# Patient Record
Sex: MALE | Race: WHITE | NOT HISPANIC OR LATINO | Employment: OTHER | URBAN - METROPOLITAN AREA
[De-identification: names, ages, dates, MRNs, and addresses within clinical notes are randomized per-mention and may not be internally consistent; named-entity substitution may affect disease eponyms.]

---

## 2017-02-07 ENCOUNTER — TELEPHONE (OUTPATIENT)
Dept: MEDICAL GROUP | Facility: MEDICAL CENTER | Age: 77
End: 2017-02-07

## 2017-02-07 RX ORDER — LISINOPRIL 10 MG/1
10 TABLET ORAL DAILY
COMMUNITY
End: 2017-11-07 | Stop reason: SDUPTHER

## 2017-02-07 RX ORDER — ATORVASTATIN CALCIUM 10 MG/1
10 TABLET, FILM COATED ORAL NIGHTLY
COMMUNITY
End: 2017-11-07 | Stop reason: SDUPTHER

## 2017-02-07 NOTE — Clinical Note
[facility]  CARLITO PETERSONPALEX.  [address]   [place] [address]  [facility] [phone]  FAX- [phone] Authorization for Release/Disclosure of Protected Health Information   Name: LALI TUCKER : 1940 SSN: [id number]   Address: [address]  [place] [address] Phone:    [phone] (home)    I authorize the entity listed below to release/disclose the PHI below to [facility]/ESMER PETERSON   Provider or Entity Name: Efren Grande MD       Address [address] [facility] [facility] [address]  [address] [address]   [phone]      Fax: [phone]     Reason for request: continuity of care   Information to be released:    [  ] LAST COLONOSCOPY, including any PATH REPORT [  ] LAST DEXA  [  ] LAST MAMMOGRAM  [  ] LAST PAP [  ] RETINA EXAM REPORT  [  ] IMMUNIZATION RECORDS  [ X ] Release all info      [  ] Check here and initial the line next to each item to release ALL health information INCLUDING  _____ Care and treatment for drug and / or alcohol abuse  _____ HIV testing, infection status, or AIDS  _____ Genetic Testing    DATES OF SERVICE OR TIME PERIOD TO BE DISCLOSED: _____________  I understand and acknowledge that:  * This Authorization may be revoked at any time by you in writing, except if your health information has already been used or disclosed.  * Your health information that will be used or disclosed as a result of you signing this authorization could be re-disclosed by the recipient. If this occurs, your re-disclosed health information may no longer be protected by State or Federal laws.  * You may refuse to sign this Authorization. Your refusal will not affect your ability to obtain treatment.  * This Authorization becomes effective upon signing and will  on (date) __________. If no date is indicated, this Authorization will  one (1) year from the signature date.    Name: Lali Tucker    Signature:     Date: 2017

## 2017-02-07 NOTE — TELEPHONE ENCOUNTER
Future Appointments       Provider Department Center    2/17/2017 10:40 AM KEVIN Andres.[facility]ANES[facility] [address]      Called Michael Pradhan in order to verify health topics prior to the Annual/N2U octaviano:      1)  All medications were updated? yes  2)  Allergies were updated? yes  3)  All care teams were updated? yes  4)  All pharmacies were updated? yes          Health Maintenance Due   Topic Date Due   • IMM DTaP/Tdap/Td Vaccine (1 - Tdap) 01/27/1961   • COLONOSCOPY  01/27/1992   • IMM ZOSTER VACCINE  01/27/2002   • IMM PNEUMOCOCCAL 65+ (ADULT) LOW/MEDIUM RISK SERIES (1 of 2 - PCV13) 01/27/2007   • IMM INFLUENZA (1) 09/01/2016              Current Outpatient Prescriptions   Medication Sig Dispense Refill   • lisinopril (PRINIVIL) 10 MG Tab Take 10 mg by mouth every day.     • atorvastatin (LIPITOR) 10 MG Tab Take 10 mg by mouth every evening.       No current facility-administered medications for this visit.     5)  Web Iz Recommendations:         1) No web iz                 6)  GUNNAR letter will be faxed to:         1) Dr. Casas for Colonoscopy records         7)  Previous PCP Med Rec will be obtained via:       1) Dr. Schwartz       8)  Notes to provider or MA:       1) Establish care               Pt was encouraged to keep the appointment and to arrive at least 30 minutes early. Patient is also aware that they must be on time or they would need to reschedule.    Provider name: SINAN ROBERTSON

## 2017-02-07 NOTE — Clinical Note
[facility]  CARLITO PETERSONPALEX.  [address]   [place] [address]  [facility] [phone]  FAX- [phone] Authorization for Release/Disclosure of Protected Health Information   Name: LALI TUCKER : 1940 SSN: [id number]   Address: [facility] [address]  [place] [address] Phone:    [phone] (home)    I authorize the entity listed below to release/disclose the PHI below to [facility]/ESMER PETERSON   Provider or Entity Name:  Dr. Leif Casas       Address  [address] [place] [address]   Phone:      Fax: [phone]     Reason for request: continuity of care   Information to be released:    [X  ] LAST COLONOSCOPY, including any PATH REPORT [  ] LAST DEXA  [  ] LAST MAMMOGRAM  [  ] LAST PAP [  ] RETINA EXAM REPORT  [  ] IMMUNIZATION RECORDS  [  ] Release all info      [  ] Check here and initial the line next to each item to release ALL health information INCLUDING  _____ Care and treatment for drug and / or alcohol abuse  _____ HIV testing, infection status, or AIDS  _____ Genetic Testing    DATES OF SERVICE OR TIME PERIOD TO BE DISCLOSED: _____________  I understand and acknowledge that:  * This Authorization may be revoked at any time by you in writing, except if your health information has already been used or disclosed.  * Your health information that will be used or disclosed as a result of you signing this authorization could be re-disclosed by the recipient. If this occurs, your re-disclosed health information may no longer be protected by State or Federal laws.  * You may refuse to sign this Authorization. Your refusal will not affect your ability to obtain treatment.  * This Authorization becomes effective upon signing and will  on (date) __________. If no date is indicated, this Authorization will  one (1) year from the signature date.    Name: Lali Tucker    Signature:     Date: 2017

## 2017-02-17 ENCOUNTER — OFFICE VISIT (OUTPATIENT)
Dept: MEDICAL GROUP | Facility: MEDICAL CENTER | Age: 77
End: 2017-02-17
Payer: MEDICARE

## 2017-02-17 VITALS
TEMPERATURE: 97.7 F | RESPIRATION RATE: 16 BRPM | WEIGHT: 210 LBS | HEART RATE: 60 BPM | OXYGEN SATURATION: 96 % | SYSTOLIC BLOOD PRESSURE: 140 MMHG | BODY MASS INDEX: 31.1 KG/M2 | HEIGHT: 69 IN | DIASTOLIC BLOOD PRESSURE: 74 MMHG

## 2017-02-17 DIAGNOSIS — E66.9 OBESITY (BMI 30-39.9): ICD-10-CM

## 2017-02-17 DIAGNOSIS — E78.5 DYSLIPIDEMIA: ICD-10-CM

## 2017-02-17 DIAGNOSIS — I10 ESSENTIAL HYPERTENSION: ICD-10-CM

## 2017-02-17 DIAGNOSIS — L98.9 SKIN LESION OF FACE: ICD-10-CM

## 2017-02-17 DIAGNOSIS — E55.9 VITAMIN D DEFICIENCY DISEASE: ICD-10-CM

## 2017-02-17 PROCEDURE — 1101F PT FALLS ASSESS-DOCD LE1/YR: CPT | Performed by: NURSE PRACTITIONER

## 2017-02-17 PROCEDURE — G8419 CALC BMI OUT NRM PARAM NOF/U: HCPCS | Performed by: NURSE PRACTITIONER

## 2017-02-17 PROCEDURE — 99203 OFFICE O/P NEW LOW 30 MIN: CPT | Performed by: NURSE PRACTITIONER

## 2017-02-17 PROCEDURE — 1036F TOBACCO NON-USER: CPT | Performed by: NURSE PRACTITIONER

## 2017-02-17 PROCEDURE — 3017F COLORECTAL CA SCREEN DOC REV: CPT | Performed by: NURSE PRACTITIONER

## 2017-02-17 PROCEDURE — 4040F PNEUMOC VAC/ADMIN/RCVD: CPT | Performed by: NURSE PRACTITIONER

## 2017-02-17 PROCEDURE — G8432 DEP SCR NOT DOC, RNG: HCPCS | Performed by: NURSE PRACTITIONER

## 2017-02-17 PROCEDURE — G8482 FLU IMMUNIZE ORDER/ADMIN: HCPCS | Performed by: NURSE PRACTITIONER

## 2017-02-17 ASSESSMENT — PATIENT HEALTH QUESTIONNAIRE - PHQ9: CLINICAL INTERPRETATION OF PHQ2 SCORE: 0

## 2017-02-17 NOTE — Clinical Note
[facility]  ESMER Andres  [address]  [place] [address]  Fax: [phone]   Authorization for Release/Disclosure of   Protected Health Information   Name: ALLI TUCKER : 1940 SSN: [id number]   Address: [address] [place], [place] [address]  [place] [address] Phone:    [phone] (home)    I authorize the entity listed below to release/disclose the PHI below to:   [facility]/ESMER Andres and ESMER Andres   Provider or Entity Name:     Address   City, State, Zip   Phone:      Fax:     Reason for request: continuity of care   Information to be released:    [  ] LAST COLONOSCOPY,  including any PATH REPORT and follow-up  [  ] LAST FIT/COLOGUARD RESULT [  ] LAST DEXA  [  ] LAST MAMMOGRAM  [  ] LAST PAP  [  ] LAST LABS [  ] RETINA EXAM REPORT  [  ] IMMUNIZATION RECORDS  [  ] Release all info      [  ] Check here and initial the line next to each item to release ALL health information INCLUDING  _____ Care and treatment for drug and / or alcohol abuse  _____ HIV testing, infection status, or AIDS  _____ Genetic Testing    DATES OF SERVICE OR TIME PERIOD TO BE DISCLOSED: _____________  I understand and acknowledge that:  * This Authorization may be revoked at any time by you in writing, except if your health information has already been used or disclosed.  * Your health information that will be used or disclosed as a result of you signing this authorization could be re-disclosed by the recipient. If this occurs, your re-disclosed health information may no longer be protected by State or Federal laws.  * You may refuse to sign this Authorization. Your refusal will not affect your ability to obtain treatment.  * This Authorization becomes effective upon signing and will  on (date) __________.      If no date is indicated, this Authorization will  one (1) year from the signature date.    Name: Lali Tucker    Signature:   Date:     2017       PLEASE FAX REQUESTED  RECORDS BACK TO: [phone]

## 2017-02-17 NOTE — MR AVS SNAPSHOT
"        Michael Walterhmann   2017 10:40 AM   Office Visit   MRN: [id number]    Department:  [address] [place] [address]   Dept Phone:  [phone]    Description:  Male : 1940   Provider:  ESMER Andres           Reason for Visit     Establish Care           Allergies as of 2017     No Known Allergies      You were diagnosed with     Dyslipidemia   [942852]       Essential hypertension   [9264562]       Vitamin D deficiency disease   [672202]       Skin lesion of face   [055129]         Vital Signs     Blood Pressure Pulse Temperature Respirations Height Weight    140/74 mmHg 60 36.5 °C (97.7 °F) 16 1.753 m (5' 9.02\") 95.255 kg (210 lb)    Body Mass Index Oxygen Saturation Smoking Status             31.00 kg/m2 96% Former Smoker         Basic Information     Date Of Birth Sex Race Ethnicity Preferred Language    1940 Male White Non- English      Your appointments     2017 10:00 AM   ANNUAL EXAM PREVENTATIVE with ESMER Andres   [facility] [address]    [address]  [facility] [address]  [facility] [address]   [phone]              Problem List              ICD-10-CM Priority Class Noted - Resolved    Dyslipidemia E78.5   2017 - Present    Essential hypertension I10   2017 - Present    Vitamin D deficiency disease E55.9   2017 - Present      Health Maintenance        Date Due Completion Dates    IMM DTaP/Tdap/Td Vaccine (1 - Tdap) 1961 ---    IMM ZOSTER VACCINE 2002 ---    IMM PNEUMOCOCCAL 65+ (ADULT) LOW/MEDIUM RISK SERIES (1 of 2 - PCV13) 2007 ---    IMM INFLUENZA (1) 2016 ---    COLONOSCOPY 2026 (Done)    Override on 2016: Done            Current Immunizations     13-VALENT PCV PREVNAR 2012    Influenza Vaccine Adult HD 10/10/2016    Pneumococcal polysaccharide vaccine (PPSV-23) 10/10/2015    SHINGLES VACCINE 3/7/2013      Below and/or attached are the medications your provider expects you to take. " Review all of your home medications and newly ordered medications with your provider and/or pharmacist. Follow medication instructions as directed by your provider and/or pharmacist. Please keep your medication list with you and share with your provider. Update the information when medications are discontinued, doses are changed, or new medications (including over-the-counter products) are added; and carry medication information at all times in the event of emergency situations     Allergies:  No Known Allergies          Medications  Valid as of: February 17, 2017 - 10:59 AM    Generic Name Brand Name Tablet Size Instructions for use    Atorvastatin Calcium (Tab) LIPITOR 10 MG Take 10 mg by mouth every evening.        Cholecalciferol (Tab) cholecalciferol 1000 UNIT Take 1,000 Units by mouth every day.        Lisinopril (Tab) PRINIVIL 10 MG Take 10 mg by mouth every day.        .                 Medicines prescribed today were sent to:     [facility] - [address] [address]    Phone: [phone] Fax: [phone]    Open 24 Hours?: No    [facility] - [place], [place] - [address] [address]    Phone: [phone] Fax: [phone]    Open 24 Hours?: No      Medication refill instructions:       If your prescription bottle indicates you have medication refills left, it is not necessary to call your provider’s office. Please contact your pharmacy and they will refill your medication.    If your prescription bottle indicates you do not have any refills left, you may request refills at any time through one of the following ways: The online [employer] system (except Urgent Care), by calling your provider’s office, or by asking your pharmacy to contact your provider’s office with a refill request. Medication refills are processed only during regular business hours and may not be available until the next business day.  Your provider may request additional information or to have a follow-up visit with you prior to refilling your medication.   *Please Note: Medication refills are assigned a new Rx number when refilled electronically. Your pharmacy may indicate that no refills were authorized even though a new prescription for the same medication is available at the pharmacy. Please request the medicine by name with the pharmacy before contacting your provider for a refill.        Your To Do List     Future Labs/Procedures Complete By Expires    COMP METABOLIC PANEL  As directed 2/18/2018    LIPID PROFILE  As directed 2/18/2018    TSH  As directed 2/18/2018    VITAMIN 1,25 DIHYDROXY  As directed 2/18/2018      Referral     A referral request has been sent to our patient care coordination department. Please allow 3-5 business days for us to process this request and contact you either by phone or mail. If you do not hear from us by the 5th business day, please call us at [phone].           [employer] Access Code: [email or web address]  Expires: 3/19/2017 10:59 AM    Your email address is not on file at [facility].  Email Addresses are required for you to sign up for [employer], please contact [phone] to verify your personal information and to provide your email address prior to attempting to register for [employer].    Michael Pradhan  [address]  [place] [address]    [employer]  A secure, online tool to manage your health information     [facility]’s [employer]® is a secure, online tool that connects you to your personalized health information from the privacy of your home -- day or night - making it very easy for you to manage your healthcare. Once the activation process is completed, you can even access your medical information using the [employer] bryanna, which is available for free in the Apple Bryanna store or Google Play store.     To learn more about [employer], visit [email or web address]    There are two levels of access available  (as shown below):   My Chart Features  [facility]  Urgent  Care Non-[facility] Doctor   Email your healthcare team securely and privately 24/7 X X X    Manage appointments: schedule your next appointment; view details of past/upcoming appointments X      Request prescription refills. X      View recent personal medical records, including lab and immunizations X X X X   View health record, including health history, allergies, medications X X X X   Read reports about your outpatient visits, procedures, consult and ER notes X X X X   See your discharge summary, which is a recap of your hospital and/or ER visit that includes your diagnosis, lab results, and care plan X X  X     How to register for [employer]:  Once your e-mail address has been verified, follow the following steps to sign up for [employer].     1. Go to  [email or web address]  2. Click on the Sign Up Now box, which takes you to the New Member Sign Up page. You will need to provide the following information:  a. Enter your [employer] Access Code exactly as it appears at the top of this page. (You will not need to use this code after you’ve completed the sign-up process. If you do not sign up before the expiration date, you must request a new code.)   b. Enter your date of birth.   c. Enter your home email address.   d. Click Submit, and follow the next screen’s instructions.  3. Create a [employer] ID. This will be your [employer] login ID and cannot be changed, so think of one that is secure and easy to remember.  4. Create a [employer] password. You can change your password at any time.  5. Enter your Password Reset Question and Answer. This can be used at a later time if you forget your password.   6. Enter your e-mail address. This allows you to receive e-mail notifications when new information is available in [employer].  7. Click Sign Up. You can now view your health information.    For assistance activating your  MyChart account, call [phone]

## 2017-02-17 NOTE — PROGRESS NOTES
Subjective:      Michael Pradhan is a 77 y.o. male who presents with [facility].Michael Pradhan is here today to establish care. He and his wife recently moved here from [place] and his wife also established with the clinic.        1. Dyslipidemia  Patient reports history of well-controlled dyslipidemia with Lipitor 10 mg. He states his last blood work was within the year. He reports no myalgias with medicine. He does not have records with him.    2. Essential hypertension  Patient is currently on lisinopril 10 mg and states his blood pressure is normally well controlled. His systolic blood pressure is just mildly elevated today but he complains of no problems with cough.    3. Vitamin D deficiency disease  Patient currently on over-the-counter vitamin D because of history of vitamin D deficiency.    4. Skin lesion of face  Patient states he was following with dermatology in [place] because of what sounds like actinic keratoses which were previously treated. He has one or 2 areas on his face which he would like to have looked at.        Current Outpatient Prescriptions   Medication Sig Dispense Refill   • vitamin D (CHOLECALCIFEROL) 1000 UNIT Tab Take 1,000 Units by mouth every day.     • lisinopril (PRINIVIL) 10 MG Tab Take 10 mg by mouth every day.     • atorvastatin (LIPITOR) 10 MG Tab Take 10 mg by mouth every evening.       No current facility-administered medications for this visit.     Social History   Substance Use Topics   • Smoking status: Former Smoker -- 4 years     Types: Cigarettes     Quit date: 02/07/1969   • Smokeless tobacco: Never Used   • Alcohol Use: Yes      Comment: RARELY     Family History   Problem Relation Age of Onset   • Diabetes Mother      Past Medical History   Diagnosis Date   • Hypertension    • Hyperlipidemia        Review of Systems   Skin: Positive for rash.   All other systems reviewed and are negative.         Objective:     /74 mmHg  Pulse 60  " Temp([facility]) 36.5 °C (97.7 °F)  Resp 16  Ht 1.753 m (5' 9.02\")  Wt 95.255 kg (210 lb)  BMI 31.00 kg/m2  SpO2 96%     Physical Exam   Constitutional: He is oriented to person, place, and time. He appears well-developed and well-nourished. No distress.   HENT:   Head: Normocephalic and atraumatic.   Right Ear: External ear normal.   Left Ear: External ear normal.   Nose: Nose normal.   Mouth/Throat: Oropharynx is clear and moist.   Eyes: Conjunctivae are normal. Right eye exhibits no discharge. Left eye exhibits no discharge.   Neck: Normal range of motion. Neck supple. No tracheal deviation present. No thyromegaly present.   Cardiovascular: Normal rate, regular rhythm and normal heart sounds.    No murmur heard.  Pulmonary/Chest: Effort normal and breath sounds normal. No respiratory distress. He has no wheezes. He has no rales.   Lymphadenopathy:     He has no cervical adenopathy.   Neurological: He is alert and oriented to person, place, and time. Coordination normal.   Skin: Skin is warm and dry. No rash noted. He is not diaphoretic. No erythema.   Patient is light complected and has 2 mildly scaly small areas on the left cheek.   Psychiatric: He has a normal mood and affect. His behavior is normal. Judgment and thought content normal.   Nursing note and vitals reviewed.              Assessment/Plan:     1. Dyslipidemia  I will have patient do lab work and follow-up in July to see if any changes in medications need to be made.  - LIPID PROFILE; Future    2. Essential hypertension  I will refill patient's lisinopril when it is due. If his systolic blood pressure continues to run in the 140 range, I may increase dosage in the future.  - COMP METABOLIC PANEL; Future  - TSH; Future    3. Vitamin D deficiency disease  Patient will do lab work to see if over-the-counter medicines are working.  - VITAMIN 1,25 DIHYDROXY; Future    4. Skin lesion of face    - REFERRAL TO DERMATOLOGY    5. Obesity (BMI 30-39.9)    - " Patient identified as having weight management issue.  Appropriate orders and counseling given.

## 2017-06-09 ENCOUNTER — OFFICE VISIT (OUTPATIENT)
Dept: MEDICAL GROUP | Facility: MEDICAL CENTER | Age: 77
End: 2017-06-09
Payer: MEDICARE

## 2017-06-09 VITALS
HEIGHT: 69 IN | TEMPERATURE: 97.6 F | OXYGEN SATURATION: 97 % | BODY MASS INDEX: 30.51 KG/M2 | WEIGHT: 206 LBS | RESPIRATION RATE: 16 BRPM | HEART RATE: 67 BPM | SYSTOLIC BLOOD PRESSURE: 132 MMHG | DIASTOLIC BLOOD PRESSURE: 72 MMHG

## 2017-06-09 DIAGNOSIS — R10.30: ICD-10-CM

## 2017-06-09 PROCEDURE — 99213 OFFICE O/P EST LOW 20 MIN: CPT | Performed by: NURSE PRACTITIONER

## 2017-06-09 NOTE — PROGRESS NOTES
"Subjective:      Michael Pradhan is a 77 y.o. male who presents with Groin Pain            Groin Pain    Michael Pradhan is here today for complaints of groin pain.    Patient reports approximately 2 weeks ago he noticed pain in his groin and testicular area. He states it was happening about every other day and would usually occur after he was done lifting weights. He does go to the gym on a regular basis to work out. Recently the pain has become more in the testicle region bilaterally and is present with sitting for prolonged periods of time. The pain does not radiate and he has not noticed redness or swelling of the testicular area. He denies dysuria or penile drip. He describes it more as a \"pressure\" type feeling. He has not noticed any bulging in the abdomen. He has continued to work out regularly.        Current Outpatient Prescriptions   Medication Sig Dispense Refill   • vitamin D (CHOLECALCIFEROL) 1000 UNIT Tab Take 1,000 Units by mouth every day.     • lisinopril (PRINIVIL) 10 MG Tab Take 10 mg by mouth every day.     • atorvastatin (LIPITOR) 10 MG Tab Take 10 mg by mouth every evening.       No current facility-administered medications for this visit.     Social History   Substance Use Topics   • Smoking status: Former Smoker -- 4 years     Types: Cigarettes     Quit date: 02/07/1969   • Smokeless tobacco: Never Used   • Alcohol Use: Yes      Comment: RARELY     Family History   Problem Relation Age of Onset   • Diabetes Mother      Past Medical History   Diagnosis Date   • Hypertension    • Hyperlipidemia        Review of Systems   All other systems reviewed and are negative.         Objective:     /72 mmHg  Pulse 67  Temp(Src) 36.4 °C (97.6 °F)  Resp 16  Ht 1.753 m (5' 9\")  Wt 93.441 kg (206 lb)  BMI 30.41 kg/m2  SpO2 97%     Physical Exam   Constitutional: He is oriented to person, place, and time. He appears well-developed and well-nourished. No distress.   HENT:   Head: Normocephalic and " atraumatic.   Right Ear: External ear normal.   Left Ear: External ear normal.   Nose: Nose normal.   Mouth/Throat: Oropharynx is clear and moist.   Eyes: Conjunctivae are normal. Right eye exhibits no discharge. Left eye exhibits no discharge.   Neck: Normal range of motion. Neck supple. No tracheal deviation present. No thyromegaly present.   Cardiovascular: Normal rate, regular rhythm and normal heart sounds.    No murmur heard.  Pulmonary/Chest: Effort normal and breath sounds normal. No respiratory distress. He has no wheezes. He has no rales.   Abdominal: Hernia confirmed negative in the right inguinal area and confirmed negative in the left inguinal area.   Genitourinary: Testes normal and penis normal. Circumcised.   No evidence of obvious hernia to exam with Valsalva. No tenderness or swelling present.   Lymphadenopathy:     He has no cervical adenopathy. No inguinal adenopathy noted on the right or left side.   Neurological: He is alert and oriented to person, place, and time. Coordination normal.   Skin: Skin is warm and dry. No rash noted. He is not diaphoretic. No erythema.   Psychiatric: He has a normal mood and affect. His behavior is normal. Judgment and thought content normal.   Nursing note and vitals reviewed.              Assessment/Plan:     1. Unilateral groin pain, unspecified laterality  Patient was advised he may have a groin pull from his recent workouts. I will send her for an ultrasound and have him decrease the intensity of his workouts for now. I explained that if it does show a hernia I will refer him to a surgeon. If testing is negative, he is to rest the area and follow back in a few weeks.  - [id number]; Future

## 2017-06-09 NOTE — MR AVS SNAPSHOT
"        Michael Carolynn   2017 8:20 AM   Office Visit   MRN: [id number]    Department:  [address] [place] [address]   Dept Phone:  [phone]    Description:  Male : 1940   Provider:  ESMER Andres           Reason for Visit     Groin Pain x a few weeks      Allergies as of 2017     No Known Allergies      You were diagnosed with     Unilateral groin pain, unspecified laterality   [[id number]]         Vital Signs     Blood Pressure Pulse Temperature Respirations Height Weight    132/72 mmHg 67 36.4 °C (97.6 °F) 16 1.753 m (5' 9\") 93.441 kg (206 lb)    Body Mass Index Oxygen Saturation Smoking Status             30.41 kg/m2 97% Former Smoker         Basic Information     Date Of Birth Sex Race Ethnicity Preferred Language    1940 Male White Non- English      Your appointments     2017  9:40 AM   ANNUAL WELLNESS with ESMER Andres, [place] [employer]    [facility] [address] ([address] [facility])    [address] [place] [address] [address]  [facility] [address]   [phone]              Problem List              ICD-10-CM Priority Class Noted - Resolved    Dyslipidemia E78.5   2017 - Present    Essential hypertension I10   2017 - Present    Vitamin D deficiency disease E55.9   2017 - Present    Obesity (BMI 30-39.9) E66.9   2017 - Present      Health Maintenance        Date Due Completion Dates    IMM DTaP/Tdap/Td Vaccine (1 - Tdap) 1961 ---    COLONOSCOPY 2026 (Done)    Override on 2016: Done            Current Immunizations     13-VALENT PCV PREVNAR 2012    Influenza Vaccine Adult HD 10/10/2016    Pneumococcal polysaccharide vaccine (PPSV-23) 10/10/2015    SHINGLES VACCINE 3/7/2013      Below and/or attached are the medications your provider expects you to take. Review all of your home medications and newly ordered medications with your provider and/or pharmacist. Follow medication instructions as directed by your provider and/or pharmacist. " Please keep your medication list with you and share with your provider. Update the information when medications are discontinued, doses are changed, or new medications (including over-the-counter products) are added; and carry medication information at all times in the event of emergency situations     Allergies:  No Known Allergies          Medications  Valid as of: June 09, 2017 -  8:34 AM    Generic Name Brand Name Tablet Size Instructions for use    Atorvastatin Calcium (Tab) LIPITOR 10 MG Take 10 mg by mouth every evening.        Cholecalciferol (Tab) cholecalciferol 1000 UNIT Take 1,000 Units by mouth every day.        Lisinopril (Tab) PRINIVIL 10 MG Take 10 mg by mouth every day.        .                 Medicines prescribed today were sent to:     [facility] - [address] [address]    Phone: [phone] Fax: [phone]    Open 24 Hours?: No    [facility] - [place], [place] - [address] [facility]    [address] [facility] [address]    Phone: [phone] Fax: [phone]    Open 24 Hours?: No      Medication refill instructions:       If your prescription bottle indicates you have medication refills left, it is not necessary to call your provider’s office. Please contact your pharmacy and they will refill your medication.    If your prescription bottle indicates you do not have any refills left, you may request refills at any time through one of the following ways: The online [employer] system (except Urgent Care), by calling your provider’s office, or by asking your pharmacy to contact your provider’s office with a refill request. Medication refills are processed only during regular business hours and may not be available until the next business day. Your provider may request additional information or to have a follow-up visit with you prior to refilling your medication.   *Please Note: Medication refills are assigned a new Rx number  when refilled electronically. Your pharmacy may indicate that no refills were authorized even though a new prescription for the same medication is available at the pharmacy. Please request the medicine by name with the pharmacy before contacting your provider for a refill.        Your To Do List     Future Labs/Procedures Complete By Expires    [id number]  As directed 12/10/2017         [employer] Access Code: Activation code not generated  Current [employer] Status: Active

## 2017-06-28 ENCOUNTER — RX ONLY (OUTPATIENT)
Age: 77
Setting detail: RX ONLY
End: 2017-06-28

## 2017-06-28 PROBLEM — Z85.828 PERSONAL HISTORY OF OTHER MALIGNANT NEOPLASM OF SKIN: Status: ACTIVE | Noted: 2017-06-28

## 2017-06-29 ENCOUNTER — HOSPITAL ENCOUNTER (OUTPATIENT)
Dept: RADIOLOGY | Facility: MEDICAL CENTER | Age: 77
End: 2017-06-29
Attending: NURSE PRACTITIONER
Payer: MEDICARE

## 2017-06-29 DIAGNOSIS — R10.30: ICD-10-CM

## 2017-06-29 PROCEDURE — 76870 US EXAM SCROTUM: CPT

## 2017-07-18 ENCOUNTER — TELEPHONE (OUTPATIENT)
Dept: MEDICAL GROUP | Facility: MEDICAL CENTER | Age: 77
End: 2017-07-18

## 2017-07-18 NOTE — TELEPHONE ENCOUNTER
Future Appointments      Provider Department Center   7/24/2017 9:40 AM ESMER Andres; DU [facility] [address] [place]        ANNUAL WELLNESS VISIT PRE-VISIT PLANNING     1.  Reviewed note from last office visit with PCP: YES Last office visit: 06/09/17    2.  If any orders were placed at last visit, do we have Results/Consult Notes?        •  Labs - Labs ordered, completed and results are in chart. 07/19/17       •  Imaging - Imaging ordered, completed and results are in chart. 06/29/17       •  Referrals - Referral ordered, patient was seen and consult notes are in chart. Care Teams updated  NO.     3.  Immunizations were updated in [employer] using WebIZ?: Yes       •  WebIZ Recommendations: HEPATITIS A  and TDAP       •  Is patient due for Tdap? YES. Patient was notified of copay.       •  Is patient due for Shingles? NO       4.  Patient is due for the following Health Maintenance Topics:   Health Maintenance Due   Topic Date Due   • Annual Wellness Visit  SCHEDULED FOR 07/24/17   • IMM DTaP/Tdap/Td Vaccine (1 - Tdap) DUE DURING VISIT     5.  Reviewed/Updated the following with patient:       •   Preferred Pharmacy? YES       •   Preferred Lab? YES       •   Medications? YES. Was Abstract Encounter opened and chart updated? YES       •   Social History? YES. Was Abstract Encounter opened and chart updated? YES       •   Family History? YES. Was Abstract Encounter opened and chart updated? YES    6.  Care Team Updated:       •   DME Company (gait device, O2, CPAP, etc.): YES       •   Other Specialists (eye doctor, derm, GYN, cardiology, endo, etc): YES       •   Last eye exam: few moths ago    7. DPA/Advanced Directive:  Patient has Durable Power of , but it is not on file. Instructed to bring in a copy to scan into their chart.    8.  Patient has the following Care Path diagnoses on Problem List:  NONE    9.  Specialty Comments was updated with diagnosis information provided by  SCP: YES no note    10.  Patient was advised: “This is a free wellness visit. The provider will screen for medical conditions to help you stay healthy. If you have other concerns to address you may be asked to discuss these at a separate visit or there may be an additional fee.”     11.  Patient was informed to arrive 15 min prior to their scheduled appointment and bring in their medication bottles?  YES

## 2017-07-18 NOTE — Clinical Note
[facility]  CARLITO AndresPVANESSA  [address]  [place] [address]  Fax: [phone]   Authorization for Release/Disclosure of   Protected Health Information   Name: LALI TUCKER : 1940 SSN: [id number]   Address: [address] [place] [address] [address]  [place] [address] Phone:    [phone] (home)    I authorize the entity listed below to release/disclose the PHI below to:   [facility]/ESMER Andres and ESMER Andres   Provider or Entity Name: Yovany Smith M.D.   Address   City, State, [facility]   Phone:    Fax: [phone]   Reason for request: continuity of care   Information to be released:    [  ] LAST COLONOSCOPY,  including any PATH REPORT and follow-up  [  ] LAST FIT/COLOGUARD RESULT [  ] LAST DEXA  [  ] LAST MAMMOGRAM  [  ] LAST PAP  [  ] LAST LABS [X] RETINA EXAM REPORT  [  ] IMMUNIZATION RECORDS  [  ] Release all info      [  ] Check here and initial the line next to each item to release ALL health information INCLUDING  _____ Care and treatment for drug and / or alcohol abuse  _____ HIV testing, infection status, or AIDS  _____ Genetic Testing    DATES OF SERVICE OR TIME PERIOD TO BE DISCLOSED: _____________  I understand and acknowledge that:  * This Authorization may be revoked at any time by you in writing, except if your health information has already been used or disclosed.  * Your health information that will be used or disclosed as a result of you signing this authorization could be re-disclosed by the recipient. If this occurs, your re-disclosed health information may no longer be protected by State or Federal laws.  * You may refuse to sign this Authorization. Your refusal will not affect your ability to obtain treatment.  * This Authorization becomes effective upon signing and will  on (date) __________.      If no date is indicated, this Authorization will  one (1) year from the signature date.    Name: Lali Tucker    Signature:               Date: 7/24/2018       PLEASE FAX REQUESTED RECORDS BACK TO: [phone]

## 2017-07-18 NOTE — Clinical Note
Request for Medical Records    Patient Name: Michael Pradhan    : 1940      Dear Doctor: Yovany Smith,    The above named patient receives primary care at the [facility] by ESMER Andres.  The patient informs us that you are his eye care Provider.    Please fax a copy of the most recent eye exam to [phone] or answer the  questions below and fax this sheet back to us at the above number.  Attached is a signed Release of Information.      Date of last eye exam: _____________    Retinal eye exam summary:        Please select the choice(s) that apply.    ____ No diabetic retinopathy    ____    Diabetic retinopathy present      Printed Name and Credentials: __________________________________    Signature of Eye Care Provider: _________________________________    We appreciate your assistance and collaboration in providing efficient patient care!    Kindest Regards,    [facility] [address]  [address] [place] [address] [phone]

## 2017-07-18 NOTE — TELEPHONE ENCOUNTER
Left message for patient to call back regarding AWV pre-visit planning. Please transfer call to [phone].

## 2017-07-19 ENCOUNTER — HOSPITAL ENCOUNTER (OUTPATIENT)
Dept: LAB | Facility: MEDICAL CENTER | Age: 77
End: 2017-07-19
Attending: NURSE PRACTITIONER
Payer: MEDICARE

## 2017-07-19 DIAGNOSIS — I10 ESSENTIAL HYPERTENSION: ICD-10-CM

## 2017-07-19 DIAGNOSIS — E55.9 VITAMIN D DEFICIENCY DISEASE: ICD-10-CM

## 2017-07-19 DIAGNOSIS — E78.5 DYSLIPIDEMIA: ICD-10-CM

## 2017-07-19 LAB
ALBUMIN SERPL BCP-MCNC: 4 G/DL (ref 3.2–4.9)
ALBUMIN/GLOB SERPL: 1.4 G/DL
ALP SERPL-CCNC: 53 U/L (ref 30–99)
ALT SERPL-CCNC: 24 U/L (ref 2–50)
ANION GAP SERPL CALC-SCNC: 7 MMOL/L (ref 0–11.9)
AST SERPL-CCNC: 22 U/L (ref 12–45)
BILIRUB SERPL-MCNC: 0.9 MG/DL (ref 0.1–1.5)
BUN SERPL-MCNC: 16 MG/DL (ref 8–22)
CALCIUM SERPL-MCNC: 9.5 MG/DL (ref 8.5–10.5)
CHLORIDE SERPL-SCNC: 105 MMOL/L (ref 96–112)
CHOLEST SERPL-MCNC: 113 MG/DL (ref 100–199)
CO2 SERPL-SCNC: 28 MMOL/L (ref 20–33)
CREAT SERPL-MCNC: 0.87 MG/DL (ref 0.5–1.4)
GFR SERPL CREATININE-BSD FRML MDRD: >60 ML/MIN/1.73 M 2
GLOBULIN SER CALC-MCNC: 2.8 G/DL (ref 1.9–3.5)
GLUCOSE SERPL-MCNC: 90 MG/DL (ref 65–99)
HDLC SERPL-MCNC: 51 MG/DL
LDLC SERPL CALC-MCNC: 53 MG/DL
POTASSIUM SERPL-SCNC: 3.9 MMOL/L (ref 3.6–5.5)
PROT SERPL-MCNC: 6.8 G/DL (ref 6–8.2)
SODIUM SERPL-SCNC: 140 MMOL/L (ref 135–145)
TRIGL SERPL-MCNC: 45 MG/DL (ref 0–149)
TSH SERPL DL<=0.005 MIU/L-ACNC: 2.38 UIU/ML (ref 0.3–3.7)

## 2017-07-19 PROCEDURE — 80061 LIPID PANEL: CPT

## 2017-07-19 PROCEDURE — 80053 COMPREHEN METABOLIC PANEL: CPT

## 2017-07-19 PROCEDURE — 84443 ASSAY THYROID STIM HORMONE: CPT

## 2017-07-19 PROCEDURE — 36415 COLL VENOUS BLD VENIPUNCTURE: CPT

## 2017-07-19 PROCEDURE — 82652 VIT D 1 25-DIHYDROXY: CPT

## 2017-07-21 LAB — 1,25(OH)2D3 SERPL-MCNC: 48.3 PG/ML (ref 19.9–79.3)

## 2017-07-24 ENCOUNTER — APPOINTMENT (OUTPATIENT)
Dept: MEDICAL GROUP | Facility: MEDICAL CENTER | Age: 77
End: 2017-07-24
Payer: MEDICARE

## 2017-07-24 PROBLEM — Z91.030 H/O BEE STING ALLERGY: Status: ACTIVE | Noted: 2017-07-24

## 2017-07-24 PROBLEM — K40.20 BILATERAL INGUINAL HERNIA WITHOUT OBSTRUCTION OR GANGRENE: Status: ACTIVE | Noted: 2017-07-24

## 2017-10-25 ENCOUNTER — APPOINTMENT (RX ONLY)
Dept: URBAN - METROPOLITAN AREA CLINIC 20 | Facility: CLINIC | Age: 77
Setting detail: DERMATOLOGY
End: 2017-10-25

## 2017-10-25 DIAGNOSIS — L57.0 ACTINIC KERATOSIS: ICD-10-CM

## 2017-10-25 PROBLEM — E78.5 HYPERLIPIDEMIA, UNSPECIFIED: Status: ACTIVE | Noted: 2017-10-25

## 2017-10-25 PROBLEM — I10 ESSENTIAL (PRIMARY) HYPERTENSION: Status: ACTIVE | Noted: 2017-10-25

## 2017-10-25 PROCEDURE — 99213 OFFICE O/P EST LOW 20 MIN: CPT

## 2017-10-25 PROCEDURE — ? COUNSELING

## 2017-10-25 ASSESSMENT — LOCATION SIMPLE DESCRIPTION DERM
LOCATION SIMPLE: SCALP
LOCATION SIMPLE: RIGHT FOREARM
LOCATION SIMPLE: RIGHT HAND
LOCATION SIMPLE: LEFT CHEEK

## 2017-10-25 ASSESSMENT — LOCATION DETAILED DESCRIPTION DERM
LOCATION DETAILED: LEFT SUPERIOR PARIETAL SCALP
LOCATION DETAILED: RIGHT DISTAL DORSAL FOREARM
LOCATION DETAILED: LEFT CENTRAL MALAR CHEEK
LOCATION DETAILED: RIGHT ULNAR DORSAL HAND

## 2017-10-25 ASSESSMENT — LOCATION ZONE DERM
LOCATION ZONE: SCALP
LOCATION ZONE: ARM
LOCATION ZONE: HAND
LOCATION ZONE: FACE

## 2017-11-07 RX ORDER — LISINOPRIL 10 MG/1
10 TABLET ORAL DAILY
Qty: 30 TAB | Refills: 10 | Status: SHIPPED | OUTPATIENT
Start: 2017-11-07 | End: 2018-08-30 | Stop reason: SDUPTHER

## 2017-11-07 RX ORDER — ATORVASTATIN CALCIUM 10 MG/1
10 TABLET, FILM COATED ORAL DAILY
Qty: 30 TAB | Refills: 10 | Status: SHIPPED | OUTPATIENT
Start: 2017-11-07 | End: 2018-08-30 | Stop reason: SDUPTHER

## 2018-03-19 ENCOUNTER — PATIENT OUTREACH (OUTPATIENT)
Dept: HEALTH INFORMATION MANAGEMENT | Facility: OTHER | Age: 78
End: 2018-03-19

## 2018-03-19 NOTE — PROGRESS NOTES
.1. Attempt #: Final  2. HealthConnect Verified: yes    3. Verify PCP: yes    4. Care Team Updated:       •   DME Company (gait device, O2, CPAP, etc.): NO       •   Other Specialists (eye doctor, derm, GYN, cardiology, endo, etc): NO    5.  Reviewed/Updated the following with patient:       •   Communication Preference Obtained? YES       •   Preferred Pharmacy? YES       •   Preferred Lab? YES       •   Family History (document living status of immediate family members and if + hx of cancer, diabetes, hypertension, hyperlipidemia, heart attack, stroke) YES. Was Abstract Encounter opened and chart updated? NO    6. [employer] Activation: already active    7. [employer] Bryanna: NO    8. Annual Wellness Visit Scheduling  Scheduling Status:Scheduled      9. Care Gap Scheduling (Attempt to Schedule EACH Overdue Care Gap!)     Health Maintenance Due   Topic Date Due   • IMM INFLUENZA (1) 09/01/2017        Scheduled patient for Annual Wellness Visit     10. Patient was advised: “This is a free wellness visit. The provider will screen for medical conditions to help you stay healthy. If you have other concerns to address you may be asked to discuss these at a separate visit or there may be an additional fee.”     11. Patient was informed to arrive 15 min prior to their scheduled appointment and bring in their medication bottles.

## 2018-03-19 NOTE — PROGRESS NOTES
1. Attempt #: Final    2. HealthConnect Verified: yes    3. Verify PCP: yes    4. Care Team Updated:       •   DME Company (gait device, O2, CPAP, etc.): NO       •   Other Specialists (eye doctor, derm, GYN, cardiology, endo, etc): NO    5.  Reviewed/Updated the following with patient:       •   Communication Preference Obtained? YES       •   Preferred Pharmacy? YES       •   Preferred Lab? YES       •   Family History (document living status of immediate family members and if + hx of cancer, diabetes, hypertension, hyperlipidemia, heart attack, stroke) YES. Was Abstract Encounter opened and chart updated? YES    6. [employer] Activation: already active    7. [employer] Bryanna: no    8. Annual Wellness Visit Scheduling  Scheduling Status:Scheduled      9. Care Gap Scheduling (Attempt to Schedule EACH Overdue Care Gap!)     Health Maintenance Due   Topic Date Due   • IMM INFLUENZA (1) 09/01/2017        Scheduled patient for Annual Wellness Visit    10. Patient was advised: “This is a free wellness visit. The provider will screen for medical conditions to help you stay healthy. If you have other concerns to address you may be asked to discuss these at a separate visit or there may be an additional fee.”     11. Patient was informed to arrive 15 min prior to their scheduled appointment and bring in their medication bottles.

## 2018-07-09 ENCOUNTER — APPOINTMENT (OUTPATIENT)
Dept: RADIOLOGY | Facility: MEDICAL CENTER | Age: 78
End: 2018-07-09
Attending: EMERGENCY MEDICINE
Payer: MEDICARE

## 2018-07-09 ENCOUNTER — HOSPITAL ENCOUNTER (OUTPATIENT)
Facility: MEDICAL CENTER | Age: 78
End: 2018-07-10
Attending: EMERGENCY MEDICINE | Admitting: HOSPITALIST
Payer: MEDICARE

## 2018-07-09 DIAGNOSIS — R20.2 PARESTHESIA OF LEFT ARM AND LEG: ICD-10-CM

## 2018-07-09 DIAGNOSIS — R68.84 JAW PAIN: ICD-10-CM

## 2018-07-09 PROBLEM — R20.0 LEFT LEG NUMBNESS: Status: ACTIVE | Noted: 2018-07-09

## 2018-07-09 PROBLEM — R07.89 ATYPICAL CHEST PAIN: Status: ACTIVE | Noted: 2018-07-09

## 2018-07-09 LAB
ALBUMIN SERPL BCP-MCNC: 4.5 G/DL (ref 3.2–4.9)
ALBUMIN/GLOB SERPL: 1.7 G/DL
ALP SERPL-CCNC: 56 U/L (ref 30–99)
ALT SERPL-CCNC: 42 U/L (ref 2–50)
ANION GAP SERPL CALC-SCNC: 10 MMOL/L (ref 0–11.9)
APPEARANCE UR: CLEAR
APTT PPP: 30.3 SEC (ref 24.7–36)
AST SERPL-CCNC: 35 U/L (ref 12–45)
BASOPHILS # BLD AUTO: 0.4 % (ref 0–1.8)
BASOPHILS # BLD: 0.02 K/UL (ref 0–0.12)
BILIRUB SERPL-MCNC: 1.2 MG/DL (ref 0.1–1.5)
BILIRUB UR QL STRIP.AUTO: NEGATIVE
BNP SERPL-MCNC: 36 PG/ML (ref 0–100)
BUN SERPL-MCNC: 15 MG/DL (ref 8–22)
CALCIUM SERPL-MCNC: 9.7 MG/DL (ref 8.5–10.5)
CHLORIDE SERPL-SCNC: 104 MMOL/L (ref 96–112)
CO2 SERPL-SCNC: 25 MMOL/L (ref 20–33)
COLOR UR: YELLOW
CREAT SERPL-MCNC: 0.89 MG/DL (ref 0.5–1.4)
EKG IMPRESSION: NORMAL
EOSINOPHIL # BLD AUTO: 0.08 K/UL (ref 0–0.51)
EOSINOPHIL NFR BLD: 1.7 % (ref 0–6.9)
ERYTHROCYTE [DISTWIDTH] IN BLOOD BY AUTOMATED COUNT: 45.5 FL (ref 35.9–50)
EST. AVERAGE GLUCOSE BLD GHB EST-MCNC: 117 MG/DL
GLOBULIN SER CALC-MCNC: 2.7 G/DL (ref 1.9–3.5)
GLUCOSE SERPL-MCNC: 96 MG/DL (ref 65–99)
GLUCOSE UR STRIP.AUTO-MCNC: NEGATIVE MG/DL
HBA1C MFR BLD: 5.7 % (ref 0–5.6)
HCT VFR BLD AUTO: 46.4 % (ref 42–52)
HGB BLD-MCNC: 15.7 G/DL (ref 14–18)
IMM GRANULOCYTES # BLD AUTO: 0.02 K/UL (ref 0–0.11)
IMM GRANULOCYTES NFR BLD AUTO: 0.4 % (ref 0–0.9)
INR PPP: 1.07 (ref 0.87–1.13)
KETONES UR STRIP.AUTO-MCNC: ABNORMAL MG/DL
LEUKOCYTE ESTERASE UR QL STRIP.AUTO: NEGATIVE
LIPASE SERPL-CCNC: 29 U/L (ref 11–82)
LYMPHOCYTES # BLD AUTO: 1.6 K/UL (ref 1–4.8)
LYMPHOCYTES NFR BLD: 33.5 % (ref 22–41)
MCH RBC QN AUTO: 33 PG (ref 27–33)
MCHC RBC AUTO-ENTMCNC: 33.8 G/DL (ref 33.7–35.3)
MCV RBC AUTO: 97.5 FL (ref 81.4–97.8)
MICRO URNS: ABNORMAL
MONOCYTES # BLD AUTO: 0.69 K/UL (ref 0–0.85)
MONOCYTES NFR BLD AUTO: 14.4 % (ref 0–13.4)
NEUTROPHILS # BLD AUTO: 2.37 K/UL (ref 1.82–7.42)
NEUTROPHILS NFR BLD: 49.6 % (ref 44–72)
NITRITE UR QL STRIP.AUTO: NEGATIVE
NRBC # BLD AUTO: 0 K/UL
NRBC BLD-RTO: 0 /100 WBC
PH UR STRIP.AUTO: 6.5 [PH]
PLATELET # BLD AUTO: 148 K/UL (ref 164–446)
PMV BLD AUTO: 11.3 FL (ref 9–12.9)
POTASSIUM SERPL-SCNC: 3.8 MMOL/L (ref 3.6–5.5)
PROT SERPL-MCNC: 7.2 G/DL (ref 6–8.2)
PROT UR QL STRIP: NEGATIVE MG/DL
PROTHROMBIN TIME: 13.6 SEC (ref 12–14.6)
RBC # BLD AUTO: 4.76 M/UL (ref 4.7–6.1)
RBC UR QL AUTO: NEGATIVE
SODIUM SERPL-SCNC: 139 MMOL/L (ref 135–145)
SP GR UR STRIP.AUTO: 1.01
TROPONIN I SERPL-MCNC: <0.01 NG/ML (ref 0–0.04)
UROBILINOGEN UR STRIP.AUTO-MCNC: 0.2 MG/DL
WBC # BLD AUTO: 4.8 K/UL (ref 4.8–10.8)

## 2018-07-09 PROCEDURE — 93005 ELECTROCARDIOGRAM TRACING: CPT

## 2018-07-09 PROCEDURE — 36415 COLL VENOUS BLD VENIPUNCTURE: CPT

## 2018-07-09 PROCEDURE — 83036 HEMOGLOBIN GLYCOSYLATED A1C: CPT

## 2018-07-09 PROCEDURE — 94760 N-INVAS EAR/PLS OXIMETRY 1: CPT

## 2018-07-09 PROCEDURE — 700117 HCHG RX CONTRAST REV CODE 255: Performed by: EMERGENCY MEDICINE

## 2018-07-09 PROCEDURE — 70498 CT ANGIOGRAPHY NECK: CPT

## 2018-07-09 PROCEDURE — 99220 PR INITIAL OBSERVATION CARE,LEVL III: CPT | Performed by: HOSPITALIST

## 2018-07-09 PROCEDURE — 84443 ASSAY THYROID STIM HORMONE: CPT

## 2018-07-09 PROCEDURE — 99285 EMERGENCY DEPT VISIT HI MDM: CPT

## 2018-07-09 PROCEDURE — 83690 ASSAY OF LIPASE: CPT

## 2018-07-09 PROCEDURE — 85025 COMPLETE CBC W/AUTO DIFF WBC: CPT

## 2018-07-09 PROCEDURE — 71045 X-RAY EXAM CHEST 1 VIEW: CPT

## 2018-07-09 PROCEDURE — G0378 HOSPITAL OBSERVATION PER HR: HCPCS

## 2018-07-09 PROCEDURE — 80053 COMPREHEN METABOLIC PANEL: CPT

## 2018-07-09 PROCEDURE — 84484 ASSAY OF TROPONIN QUANT: CPT

## 2018-07-09 PROCEDURE — 85730 THROMBOPLASTIN TIME PARTIAL: CPT

## 2018-07-09 PROCEDURE — 700105 HCHG RX REV CODE 258: Performed by: HOSPITALIST

## 2018-07-09 PROCEDURE — 70496 CT ANGIOGRAPHY HEAD: CPT

## 2018-07-09 PROCEDURE — 85610 PROTHROMBIN TIME: CPT

## 2018-07-09 PROCEDURE — 93005 ELECTROCARDIOGRAM TRACING: CPT | Performed by: EMERGENCY MEDICINE

## 2018-07-09 PROCEDURE — 81003 URINALYSIS AUTO W/O SCOPE: CPT

## 2018-07-09 PROCEDURE — 83880 ASSAY OF NATRIURETIC PEPTIDE: CPT

## 2018-07-09 PROCEDURE — 306588 SLEEVE,VASO CALF MED: Performed by: HOSPITALIST

## 2018-07-09 RX ORDER — LABETALOL HYDROCHLORIDE 5 MG/ML
10 INJECTION, SOLUTION INTRAVENOUS EVERY 4 HOURS PRN
Status: DISCONTINUED | OUTPATIENT
Start: 2018-07-09 | End: 2018-07-10 | Stop reason: HOSPADM

## 2018-07-09 RX ORDER — AMOXICILLIN 250 MG
2 CAPSULE ORAL 2 TIMES DAILY
Status: DISCONTINUED | OUTPATIENT
Start: 2018-07-09 | End: 2018-07-10 | Stop reason: HOSPADM

## 2018-07-09 RX ORDER — POLYETHYLENE GLYCOL 3350 17 G/17G
1 POWDER, FOR SOLUTION ORAL
Status: DISCONTINUED | OUTPATIENT
Start: 2018-07-09 | End: 2018-07-10 | Stop reason: HOSPADM

## 2018-07-09 RX ORDER — HYDRALAZINE HYDROCHLORIDE 20 MG/ML
10 INJECTION INTRAMUSCULAR; INTRAVENOUS
Status: DISCONTINUED | OUTPATIENT
Start: 2018-07-09 | End: 2018-07-09

## 2018-07-09 RX ORDER — ATORVASTATIN CALCIUM 80 MG/1
80 TABLET, FILM COATED ORAL DAILY
Status: DISCONTINUED | OUTPATIENT
Start: 2018-07-10 | End: 2018-07-10 | Stop reason: HOSPADM

## 2018-07-09 RX ORDER — SODIUM CHLORIDE 9 MG/ML
1000 INJECTION, SOLUTION INTRAVENOUS ONCE
Status: COMPLETED | OUTPATIENT
Start: 2018-07-09 | End: 2018-07-10

## 2018-07-09 RX ORDER — BISACODYL 10 MG
10 SUPPOSITORY, RECTAL RECTAL
Status: DISCONTINUED | OUTPATIENT
Start: 2018-07-09 | End: 2018-07-10 | Stop reason: HOSPADM

## 2018-07-09 RX ADMIN — SODIUM CHLORIDE 1000 ML: 9 INJECTION, SOLUTION INTRAVENOUS at 22:55

## 2018-07-09 RX ADMIN — IOHEXOL 100 ML: 350 INJECTION, SOLUTION INTRAVENOUS at 19:28

## 2018-07-09 ASSESSMENT — LIFESTYLE VARIABLES
ALCOHOL_USE: NO
EVER_SMOKED: YES

## 2018-07-09 ASSESSMENT — PATIENT HEALTH QUESTIONNAIRE - PHQ9
1. LITTLE INTEREST OR PLEASURE IN DOING THINGS: NOT AT ALL
SUM OF ALL RESPONSES TO PHQ9 QUESTIONS 1 AND 2: 0
2. FEELING DOWN, DEPRESSED, IRRITABLE, OR HOPELESS: NOT AT ALL

## 2018-07-09 ASSESSMENT — PAIN SCALES - GENERAL
PAINLEVEL_OUTOF10: 0
PAINLEVEL_OUTOF10: 0

## 2018-07-09 NOTE — ED PROVIDER NOTES
ED Provider Note    CHIEF COMPLAINT  Chief Complaint   Patient presents with   • Chest Pain     woke up with CP at 0630   • Jaw Pain     chest pain radiating into jaw since 1:30pm   • Numbness     left arm, woke up with left arm numbness at 0630       [facility]  Michael Pradhan is a 78 y.o. male with a history of hypertension, hyperlipidemia who presents with complaints of numbness and tingling to the left arm and left leg since awakening this morning at about 6:30. The patient says after awakening, it felt like his left arm was asleep. However he continued to have numbness and tingling to the left arm and had some tingling to the left leg. He then developed a pressure to his chest and left jaw about 2 hours later. He took 4 baby aspirin at about 9:00 this morning. He continued have some jaw pain and numbness and tingling to left arm so presents to the emergency department.  The patient denies any previous history of diabetes, or cardiac disease. He has a remote history of tobacco use, says he quit in the 1960s. There is no family history of strokes or cardiac disease.    REVIEW OF SYSTEMS  See HPI for further details. All other systems are negative.     PAST MEDICAL HISTORY  Past Medical History:   Diagnosis Date   • Hyperlipidemia    • Hypertension        FAMILY HISTORY  Family History   Problem Relation Age of Onset   • Diabetes Mother    • Diabetes Sister    • Other Brother      Spinal Miningitis   • Schizophrenia Brother    • Diabetes Sister    • Dementia Maternal Grandmother    • Dementia Paternal Grandmother    • Colon Cancer Paternal Grandfather    • Cancer Brother      Esophigial       SOCIAL HISTORY  Social History     Social History   • Marital status:      Spouse name: N/A   • Number of children: N/A   • Years of education: N/A     Social History Main Topics   • Smoking status: Former Smoker     Packs/day: 1.00     Years: 5.00     Types: Cigarettes     Quit date: 2/7/1969   • Smokeless tobacco:  "Never Used      Comment: Started smoking at age [age] quit started again at [age]   • Alcohol use 0.0 - 1.2 oz/week   • Drug use: No   • Sexual activity: Yes     Partners: Female     Other Topics Concern   • Not on file     Social History Narrative   • No narrative on file       SURGICAL HISTORY  Past Surgical History:   Procedure Laterality Date   • TONSILLECTOMY         CURRENT MEDICATIONS  Home Medications    **Home medications have not yet been reviewed for this encounter**         ALLERGIES  Allergies   Allergen Reactions   • Bee        PHYSICAL EXAM  VITAL SIGNS: BP (!) 181/99   Pulse 72   Temp 37.1 °C (98.7 °F)   Resp 16   Ht 1.778 m (5' 10\")   Wt 98.9 kg (218 lb 0.6 oz)   SpO2 96%   BMI 31.28 kg/m²   Constitutional: Awake, alert, in no acute distress, Non-toxic appearance.   HENT: Atraumatic. Bilateral external ears normal, mucous membranes moist, throat nonerythematous without exudates, nose is normal.  Eyes: PERRL, EOMI, conjunctiva moist, noninjected.  Neck: Nontender, Normal range of motion, No nuchal rigidity, No stridor.   Lymphatic: No lymphadenopathy noted.   Cardiovascular: Regular rate and rhythm, no murmurs, rubs, gallops.  Thorax & Lungs:  Good breath sounds bilaterally, no wheezes, rales, or retractions.  No chest tenderness.  Abdomen: Bowel sounds normal, Soft, nontender, nondistended, no rebound, guarding, masses.  Back: No CVA or spinal tenderness.  Extremities: Intact distal pulses, No edema, No tenderness.   Skin: Warm, Dry, No rashes.   Musculoskeletal: No joint swelling or tenderness.  Neurologic: Alert & oriented x 3, cranial nerves II through XII intact, speech is fluent, no facial asymmetry, visual fields are intact, sensory intact to light touch, and motor function shows 5/5 strength to the upper and lower extremities, no pronator drift, alternating finger movements are intact, No focal deficits.   Psychiatric: Affect normal, Judgment normal, Mood normal.     EKG  12-lead EKG " shows a normal sinus rhythm, rate of 70, normal intervals, normal axis, no acute ST-T wave elevations or ST depressions, no pathologic Q waves, no evidence for an acute injury or ischemic pattern on my reading, there is no previous EKG for comparison.      RADIOLOGY/PROCEDURES  CT-CTA NECK WITH & W/O-POST PROCESSING   Final Result      1.  Patent carotid and vertebral arteries without evidence of occlusion or dissection.      2.  Minimal atherosclerotic plaque at the left carotid bifurcation.      3.  Diminutive right vertebral artery which terminates below the level of the basilar artery.      CT-CTA HEAD WITH & W/O-POST PROCESS   Final Result      1.  No evidence of intracranial vascular occlusion or aneurysm.      2.  Diminutive right vertebral artery which terminates low the level of the basilar artery.      DX-CHEST-LIMITED (1 VIEW)   Final Result      No acute cardiopulmonary disease.            COURSE & MEDICAL DECISION MAKING  Pertinent Labs & Imaging studies reviewed. (See chart for details)  The patient presents with above complaints. On examination he has some subjective numbness to the left arm, but otherwise has an intact neurologic exam With no weakness. He has had no dizziness or vertiginous symptoms. He also having some jaw pain and chest pain which may be of cardiac etiology. EKG shows a normal sinus rhythm. Chemistry shows no acute cardiac or pulmonary abnormalities. CT angiogram of the head and neck were obtained which no acute thrombus, occlusion, or dissection. CBC shows white count 4800, normal differential, hemoglobin 15.7, Chemistry profile normal, troponin less than 0.01, BNP 36, INR 1.07. Urinalysis trace ketones, otherwise negative. Findings were discussed with the patient and given his symptoms, he will be admitted. He has had complaint of numbness to left side which is concerning for possible stroke or TIA. He also has been in some jaw and chest pain which is concerning for possible  cardiac etiology. The patient did take 4 baby aspirin earlier today. Case was discussed with Dr. Valiente for admission.        FINAL IMPRESSION  1. Left sided paresthesias  2. Chest pain  3.         Electronically signed by: Hardik Mcguire, 7/9/2018 4:48 PM

## 2018-07-09 NOTE — ED NOTES
Pt ambulates to triage with c/c left arm numbness & chest pain that woke him up this morning at 0630.  Pt also c/c jaw pain (radiating from chest) since 1:30pm.      Pt took 324mg of Aspirin at 0900.    No facial droop, no slurred speech, no arm drift, strong/equal , ambulates without difficulty.    Pt to lobby & advised to inform RN of any changes.    Charge Rn aware of pt.

## 2018-07-10 ENCOUNTER — APPOINTMENT (OUTPATIENT)
Dept: RADIOLOGY | Facility: MEDICAL CENTER | Age: 78
End: 2018-07-10
Attending: HOSPITALIST
Payer: MEDICARE

## 2018-07-10 VITALS
HEIGHT: 70 IN | DIASTOLIC BLOOD PRESSURE: 58 MMHG | OXYGEN SATURATION: 94 % | SYSTOLIC BLOOD PRESSURE: 131 MMHG | HEART RATE: 60 BPM | RESPIRATION RATE: 18 BRPM | BODY MASS INDEX: 30.65 KG/M2 | WEIGHT: 214.07 LBS | TEMPERATURE: 98.5 F

## 2018-07-10 PROBLEM — R20.0 LEFT SIDED NUMBNESS: Status: RESOLVED | Noted: 2018-07-09 | Resolved: 2018-07-10

## 2018-07-10 LAB
CHOLEST SERPL-MCNC: 109 MG/DL (ref 100–199)
EKG IMPRESSION: NORMAL
HDLC SERPL-MCNC: 39 MG/DL
LDLC SERPL CALC-MCNC: 57 MG/DL
LV EJECT FRACT MOD 2C 99903: 60.52
LV EJECT FRACT MOD 4C 99902: 47.74
LV EJECT FRACT MOD BP 99901: 56.15
TRIGL SERPL-MCNC: 64 MG/DL (ref 0–149)
TROPONIN I SERPL-MCNC: <0.01 NG/ML (ref 0–0.04)
TROPONIN I SERPL-MCNC: <0.01 NG/ML (ref 0–0.04)
TSH SERPL DL<=0.005 MIU/L-ACNC: 2.41 UIU/ML (ref 0.38–5.33)

## 2018-07-10 PROCEDURE — G8989 SELF CARE D/C STATUS: HCPCS | Mod: CH

## 2018-07-10 PROCEDURE — 36415 COLL VENOUS BLD VENIPUNCTURE: CPT

## 2018-07-10 PROCEDURE — 700102 HCHG RX REV CODE 250 W/ 637 OVERRIDE(OP): Performed by: HOSPITALIST

## 2018-07-10 PROCEDURE — G8980 MOBILITY D/C STATUS: HCPCS | Mod: CI

## 2018-07-10 PROCEDURE — A9270 NON-COVERED ITEM OR SERVICE: HCPCS | Performed by: HOSPITALIST

## 2018-07-10 PROCEDURE — 80061 LIPID PANEL: CPT

## 2018-07-10 PROCEDURE — 97161 PT EVAL LOW COMPLEX 20 MIN: CPT

## 2018-07-10 PROCEDURE — G0378 HOSPITAL OBSERVATION PER HR: HCPCS

## 2018-07-10 PROCEDURE — G8979 MOBILITY GOAL STATUS: HCPCS | Mod: CI

## 2018-07-10 PROCEDURE — 99217 PR OBSERVATION CARE DISCHARGE: CPT | Performed by: INTERNAL MEDICINE

## 2018-07-10 PROCEDURE — G8988 SELF CARE GOAL STATUS: HCPCS | Mod: CH

## 2018-07-10 PROCEDURE — G8987 SELF CARE CURRENT STATUS: HCPCS | Mod: CH

## 2018-07-10 PROCEDURE — 93306 TTE W/DOPPLER COMPLETE: CPT

## 2018-07-10 PROCEDURE — 93005 ELECTROCARDIOGRAM TRACING: CPT | Performed by: HOSPITALIST

## 2018-07-10 PROCEDURE — 93010 ELECTROCARDIOGRAM REPORT: CPT | Mod: 59 | Performed by: INTERNAL MEDICINE

## 2018-07-10 PROCEDURE — 93306 TTE W/DOPPLER COMPLETE: CPT | Mod: 26 | Performed by: INTERNAL MEDICINE

## 2018-07-10 PROCEDURE — 70551 MRI BRAIN STEM W/O DYE: CPT

## 2018-07-10 PROCEDURE — G8978 MOBILITY CURRENT STATUS: HCPCS | Mod: CI

## 2018-07-10 PROCEDURE — 84484 ASSAY OF TROPONIN QUANT: CPT

## 2018-07-10 PROCEDURE — 97165 OT EVAL LOW COMPLEX 30 MIN: CPT

## 2018-07-10 PROCEDURE — A9502 TC99M TETROFOSMIN: HCPCS

## 2018-07-10 PROCEDURE — 700111 HCHG RX REV CODE 636 W/ 250 OVERRIDE (IP)

## 2018-07-10 RX ORDER — ASPIRIN 81 MG/1
81 TABLET ORAL DAILY
Qty: 30 TAB | COMMUNITY
Start: 2018-07-11 | End: 2019-05-16

## 2018-07-10 RX ORDER — REGADENOSON 0.08 MG/ML
INJECTION, SOLUTION INTRAVENOUS
Status: COMPLETED
Start: 2018-07-10 | End: 2018-07-10

## 2018-07-10 RX ADMIN — REGADENOSON 0.4 MG: 0.08 INJECTION, SOLUTION INTRAVENOUS at 12:02

## 2018-07-10 RX ADMIN — ATORVASTATIN CALCIUM 80 MG: 80 TABLET, FILM COATED ORAL at 06:05

## 2018-07-10 RX ADMIN — STANDARDIZED SENNA CONCENTRATE AND DOCUSATE SODIUM 2 TABLET: 8.6; 5 TABLET, FILM COATED ORAL at 14:13

## 2018-07-10 RX ADMIN — ASPIRIN 81 MG: 81 TABLET, COATED ORAL at 06:06

## 2018-07-10 ASSESSMENT — COGNITIVE AND FUNCTIONAL STATUS - GENERAL
DAILY ACTIVITIY SCORE: 24
SUGGESTED CMS G CODE MODIFIER MOBILITY: CH
SUGGESTED CMS G CODE MODIFIER DAILY ACTIVITY: CH
MOBILITY SCORE: 24

## 2018-07-10 ASSESSMENT — ENCOUNTER SYMPTOMS
WHEEZING: 0
DIZZINESS: 0
HEADACHES: 0
VOMITING: 0
TINGLING: 0
NAUSEA: 0
SENSORY CHANGE: 1
FEVER: 0
COUGH: 0
MYALGIAS: 0
SHORTNESS OF BREATH: 0
ABDOMINAL PAIN: 0
DEPRESSION: 0
PHOTOPHOBIA: 0
CHILLS: 0
SORE THROAT: 0
FOCAL WEAKNESS: 0
DIARRHEA: 0
PALPITATIONS: 0

## 2018-07-10 ASSESSMENT — PAIN SCALES - GENERAL
PAINLEVEL_OUTOF10: 0

## 2018-07-10 ASSESSMENT — ACTIVITIES OF DAILY LIVING (ADL): TOILETING: INDEPENDENT

## 2018-07-10 ASSESSMENT — GAIT ASSESSMENTS
GAIT LEVEL OF ASSIST: SUPERVISED
DISTANCE (FEET): 200

## 2018-07-10 NOTE — DISCHARGE SUMMARY
Discharge Summary    CHIEF COMPLAINT ON ADMISSION  Chief Complaint   Patient presents with   • Chest Pain     woke up with CP at 0630   • Jaw Pain     chest pain radiating into jaw since 1:30pm   • Numbness     left arm, woke up with left arm numbness at 0630       Reason for Admission  ARM NUMBNES     Admission Date  7/9/2018  Discharge Date  07/10/18    CODE STATUS  Full Code    HPI & HOSPITAL COURSE  This is a 78 y.o. male here with multiple complaints including chest pain, left arm pain, and left arm numbness and tingling.  Morning of presentation he woke up with left arm numbness and tingling to his fingertips.  The tingling resolved but the arm remained numb for the rest of the day.  After lunch he began having chest pain radiating up to his jaw.  He took 4 baby aspirin and his symptoms had resolved at time of presentation to the emergency department.  Hospital workup showed negative troponins.  Twelve-lead EKG initially showed sinus rhythm rate 70 with no acute ST elevations or depressions.  Repeat 12-lead EKG showed sinus bradycardia rate 55 with no acute ST elevations or depressions.  Overnight telemetry revealed sinus rhythm/sinus bradycardia rate 56-60.  Nuclear medicine stress test was negative for ischemia.  Echocardiogram showed EF 60% with mildly dilated right ventricle and no wall motion abnormalities.  CTA neck/head unremarkable and MRI showed no acute findings.  He was evaluated by PT/OT who recognized no needs at this time.  Currently he is ambulatory independently without any chest pain, shortness of breath, palpitations, dizziness or weakness.  He is tolerating a diet without any nausea or vomiting.  He states complete resolution of his chest pain and left arm numbness.  He and his wife at bedside are instructed to call 911 or return to the nearest emergency department for any emergencies.    Therefore, he is discharged in good and stable condition to home with close outpatient  follow-up.      FOLLOW UP ITEMS POST DISCHARGE  Left arm numbness and tingling returns and persists, consider cervical spine imaging  Follow-up with PCP  Start taking daily baby ASA    DISCHARGE DIAGNOSES  Left-sided numbness -resolved  Chest pain -resolved  TIA  Dyslipidemia -on statin therapy  Essential HTN -on lisinopril    FOLLOW UP  Future Appointments  Date Time Provider Department Center   7/16/2018 10:40 AM ESMER Andres [facility]   8/13/2018 10:40 AM [employer]  [facility] [employer]     MEDICATIONS ON DISCHARGE     Medication List      START taking these medications      Instructions   aspirin 81 MG EC tablet  Start taking on:  7/11/2018   Take 1 Tab by mouth every day.  Dose:  81 mg        CONTINUE taking these medications      Instructions   atorvastatin 10 MG Tabs  Commonly known as:  LIPITOR   Take 1 Tab by mouth every day.  Dose:  10 mg     lisinopril 10 MG Tabs  Commonly known as:  PRINIVIL   Take 1 Tab by mouth every day.  Dose:  10 mg     vitamin D 1000 UNIT Tabs  Commonly known as:  cholecalciferol   Take 1,000 Units by mouth every day.  Dose:  1000 Units            Allergies  Allergies   Allergen Reactions   • Bee        DIET  Orders Placed This Encounter   Procedures   • Diet Order Regular     Standing Status:   Standing     Number of Occurrences:   1     Order Specific Question:   Diet:     Answer:   Regular [1]       ACTIVITY  As tolerated.  Weight bearing as tolerated    CONSULTATIONS  NA    PROCEDURES  NA    LABORATORY  Lab Results   Component Value Date    SODIUM 139 07/09/2018    POTASSIUM 3.8 07/09/2018    CHLORIDE 104 07/09/2018    CO2 25 07/09/2018    GLUCOSE 96 07/09/2018    BUN 15 07/09/2018    CREATININE 0.89 07/09/2018        Lab Results   Component Value Date    WBC 4.8 07/09/2018    HEMOGLOBIN 15.7 07/09/2018    HEMATOCRIT 46.4 07/09/2018    PLATELETCT 148 (L) 07/09/2018        LIBERTY Zuniga

## 2018-07-10 NOTE — THERAPY
"Occupational Therapy Evaluation completed.   Functional Status:  Pt presents to acute skilled OT services following LUE/LE numbness and CP. Pt was able to perform basic self care tasks with supervision/MI, strength, ROM, coordination and sensation intact and functional. Pt appears to be near his baseline, all symptoms resolved. No further acute OT needs indicated at this time.   Plan of Care: Patient with no further skilled OT needs in the acute care setting at this time  Discharge Recommendations:  Equipment: No Equipment Needed. Post-acute therapy Currently anticipate no further skilled therapy needs once patient is discharged from the inpatient setting.    See \"Rehab Therapy-Acute\" Patient Summary Report for complete documentation.    "

## 2018-07-10 NOTE — CARE PLAN
Problem: Safety  Goal: Will remain free from injury  Outcome: PROGRESSING AS EXPECTED  Interventions: Implement safety precautions (non-skid socks,, bed in lowest/locked position, hourly rounding) Provide assistance w/ mobility and ADLs (toileting, ambulating) Reinforce use of call light    Problem: Knowledge Deficit  Goal: Knowledge of disease process/condition, treatment plan, diagnostic tests, and medications will improve  Outcome: PROGRESSING AS EXPECTED  Interventions: Assess patient's knowledge of condition, treatment plan, diagnostic tests and medication. Provide education to patient on plan of care including tests, procedures and preparation. Allow for questions.

## 2018-07-10 NOTE — THERAPY
"Physical Therapy Evaluation completed.   Bed Mobility:  Supine to Sit: Modified Independent  Transfers: Sit to Stand: Modified Independent  Gait: Level Of Assist: Supervised with No Equipment Needed       Plan of Care: Patient with no further skilled PT needs in the acute care setting at this time  Discharge Recommendations: Equipment: No Equipment Needed. Post-acute therapy Currently anticipate no further skilled therapy needs once patient is discharged from the inpatient setting.    Pt is a 78 year old male admitted to the hospital for L UE/LE numbness and chest pain. Pt reports that prior to admit, he was independent with all mobility and ADL's. At time of initial evaluation, pt doing well with all mobility tasks. Pt performed all mobility tasks at SPV to Mod I level. Pt ambulated around unit with steady gait, no LOB or gait deviations present. Pt's balance, strength and coordination are all intact at this time. Pt denies ongoing sensory changes to the L UE or LE. Pt does not currently demonstrate the need for ongoing PT intervention while in the acute care setting. No post acute equipment or therapy needs identified at this time.     See \"Rehab Therapy-Acute\" Patient Summary Report for complete documentation.     "

## 2018-07-10 NOTE — H&P
Hospital Medicine History and Physical    Date of Service  7/9/2018    Chief Complaint  Chief Complaint   Patient presents with   • Chest Pain     woke up with CP at 0630   • Jaw Pain     chest pain radiating into jaw since 1:30pm   • Numbness     left arm, woke up with left arm numbness at 0630       History of Presenting Illness  78 y.o. male who presented on 7/9/2018 with multiple complaints including upper chest pain, and left-sided numbness.  The patient states that he woke up this morning with left arm numbness and tingling of his fingers.  The tingling resolved but the arm remained numb for the rest of the day.  After lunch, he took 4 baby aspirins when he noted that the numbness and tingling had extended into his face on the left as well as his left thigh.  By the time of his arrival at the hospital, his symptoms had primarily resolved.  The patient also reports that around mid morning, he began to have upper chest pressure which is nonradiating and had no alleviating or aggravating factors.  He states that this was similar to past episodes of dyspepsia although he had not eaten any foods that would typically cause this.  His pain resolved spontaneously after lunch.  He denies any diaphoresis, nausea, lightheadedness, palpitations, but did report getting warm when he had this pain.  When he arrived at the hospital, he was noted to have elevated blood pressures.  Prior to this, patient states that he was in his usual state of health with no recent fevers, chills, URIs, abdominal pain, diarrhea or dysuria.  He denies any family history is of MIs or CVAs.        Primary Care Physician  Socrates Martin, A.P.N.    Consultants  None    Code Status  Full    Review of Systems  Review of Systems   Constitutional: Negative for chills and fever.   HENT: Negative for congestion and sore throat.    Eyes: Negative for photophobia.   Respiratory: Negative for cough, shortness of breath and wheezing.    Cardiovascular:  Positive for chest pain (Resolved). Negative for palpitations.   Gastrointestinal: Negative for abdominal pain, diarrhea, nausea and vomiting.   Genitourinary: Negative for dysuria.   Musculoskeletal: Negative for myalgias.   Skin: Negative.    Neurological: Positive for sensory change (Left-sided numbness resolved). Negative for dizziness, tingling, focal weakness and headaches.   Psychiatric/Behavioral: Negative for depression and suicidal ideas.        Past Medical History  Past Medical History:   Diagnosis Date   • Hyperlipidemia    • Hypertension        Surgical History  Past Surgical History:   Procedure Laterality Date   • TONSILLECTOMY         Medications  No current facility-administered medications on file prior to encounter.      Current Outpatient Prescriptions on File Prior to Encounter   Medication Sig Dispense Refill   • lisinopril (PRINIVIL) 10 MG Tab Take 1 Tab by mouth every day. 30 Tab 10   • atorvastatin (LIPITOR) 10 MG Tab Take 1 Tab by mouth every day. 30 Tab 10   • vitamin D (CHOLECALCIFEROL) 1000 UNIT Tab Take 1,000 Units by mouth every day.         Family History  Family History   Problem Relation Age of Onset   • Diabetes Mother    • Diabetes Sister    • Other Brother      Spinal Miningitis   • Schizophrenia Brother    • Diabetes Sister    • Dementia Maternal Grandmother    • Dementia Paternal Grandmother    • Colon Cancer Paternal Grandfather    • Cancer Brother      Esophigial       Social History  Social History   Substance Use Topics   • Smoking status: Former Smoker     Packs/day: 1.00     Years: 5.00     Types: Cigarettes     Quit date: 1969   • Smokeless tobacco: Never Used      Comment: Started smoking at age 18-23 quit started again at [age]   • Alcohol use 0.0 - 1.2 oz/week       Allergies  Allergies   Allergen Reactions   • Bee         Physical Exam  Laboratory   Hemodynamics  Temp (24hrs), Av.1 °C (98.7 °F), Min:37.1 °C (98.7 °F), Max:37.1 °C (98.7 °F)   Temperature:  37.1 °C (98.7 °F)  Pulse  Av  Min: 64  Max: 72    Blood Pressure : 158/86      Respiratory      Respiration: 18, Pulse Oximetry: 97 %             Physical Exam   Constitutional: He is oriented to person, place, and time. No distress.   HENT:   Head: Normocephalic and atraumatic.   Right Ear: External ear normal.   Left Ear: External ear normal.   Eyes: EOM are normal. Right eye exhibits no discharge. Left eye exhibits no discharge.   Neck: Neck supple. No JVD present.   Cardiovascular: Normal rate, regular rhythm and normal heart sounds.    Pulmonary/Chest: Effort normal and breath sounds normal. No respiratory distress. He exhibits no tenderness.   Abdominal: Soft. Bowel sounds are normal. He exhibits no distension. There is no tenderness.   Musculoskeletal: He exhibits no edema.   Neurological: He is alert and oriented to person, place, and time. No cranial nerve deficit.   Skin: Skin is dry. He is not diaphoretic. No erythema.   Psychiatric: He has a normal mood and affect. His behavior is normal.   Nursing note and vitals reviewed.    Capillary refill less than 3 seconds, distal pulses intact    Recent Labs      18   1600   WBC  4.8   RBC  4.76   HEMOGLOBIN  15.7   HEMATOCRIT  46.4   MCV  97.5   MCH  33.0   MCHC  33.8   RDW  45.5   PLATELETCT  148*   MPV  11.3     Recent Labs      18   1600   SODIUM  139   POTASSIUM  3.8   CHLORIDE  104   CO2  25   GLUCOSE  96   BUN  15   CREATININE  0.89   CALCIUM  9.7     Recent Labs      18   1600   ALTSGPT  42   ASTSGOT  35   ALKPHOSPHAT  56   TBILIRUBIN  1.2   GLUCOSE  96     Recent Labs      18   1600   APTT  30.3   INR  1.07     Recent Labs      18   1600   BNPBTYPENAT  36         Lab Results   Component Value Date    TROPONINI <0.01 2018       Imaging  Ct-cta Head With & W/o-post Process    Result Date: 2018 7:10 PM HISTORY/REASON FOR EXAM:  Left arm numbness and chest pain. TECHNIQUE/EXAM DESCRIPTION: CT angiogram  of the [place] without and with contrast.  Initial precontrast images were obtained of the head from the skull base through the vertex.  Postcontrast images were obtained of the [place] following the power injection of nonionic contrast at 5.0 mL/sec. Thin-section helical images were obtained with overlapping reconstruction interval. Coronal and sagittal multiplanar volume reformats were generated.  3D angiographic images were reviewed on PACS.  Maximum intensity projection (MIP) images were generated and reviewed. 100 mL of Omnipaque 350 nonionic contrast was injected intravenously. Low dose optimization technique was utilized for this CT exam including automated exposure control and adjustment of the mA and/or kV according to patient size. COMPARISON:  None. FINDINGS: The right vertebral artery is diminutive and terminates below the basilar artery. The left vertebral artery is patent. Posterior cerebral arteries are patent. Intracranial internal carotid arteries are patent. The anterior circulation shows no stenotic or occlusive lesion. No aneurysm is evident about the [place]. The brain is unremarkable.     1.  No evidence of intracranial vascular occlusion or aneurysm. 2.  Diminutive right vertebral artery which terminates low the level of the basilar artery.    Ct-cta Neck With & W/o-post Processing    Result Date: 7/9/2018 7/9/2018 7:10 PM HISTORY/REASON FOR EXAM: Neurological Deficit TECHNIQUE/EXAM DESCRIPTION: CT angiogram of the neck without and with contrast, with reconstructions. Initial precontrast images were obtained from the great vessels through the skull base. Postcontrast images were obtained of the neck from the great vessels through the skull base following the power injection of nonionic contrast at 5.0 mL/sec. Thin-section helical images were obtained with overlapping reconstruction interval. Coronal and oblique multiplanar volume reformats were generated. 3-D images  were reviewed on a PACS workstation. Maximum intensity pixel shading reconstructions were performed. 100 mL of Omnipaque 350 nonionic contrast was injected intravenously. Cervical internal carotid artery percent stenosis is calculated using the standard method according to the NASCET criteria wherein a segment of uniform caliber mid or distal cervical internal carotid is used as the reference denominator. Low dose optimization technique was utilized for this CT exam including automated exposure control and adjustment of the mA and/or kV according to patient size. COMPARISON: None. FINDINGS: The common, internal and external carotid arteries are patent bilaterally. There is minimal atherosclerotic plaque at the left carotid bifurcation. There is no significant flow limitation. The right vertebral artery is diminutive and terminates below the  basilar artery. The left vertebral artery is patent.     1.  Patent carotid and vertebral arteries without evidence of occlusion or dissection. 2.  Minimal atherosclerotic plaque at the left carotid bifurcation. 3.  Diminutive right vertebral artery which terminates below the level of the basilar artery.    Dx-chest-limited (1 View)    Result Date: 7/9/2018 7/9/2018 4:05 PM HISTORY/REASON FOR EXAM:  Chest Pain. TECHNIQUE/EXAM DESCRIPTION AND NUMBER OF VIEWS: Single AP view of the chest. COMPARISON: None FINDINGS: The lungs are clear. The cardiac silhouette is normal in size. There are no pleural effusions. There are no pneumothoraces. No significant bony abnormality is present.     No acute cardiopulmonary disease.     Assessment/Plan     Anticipate that patient will need less than 2 midnights for management of the discussed medical issues.    * Left sided numbness   Assessment & Plan    Symptoms have primarily resolved.  CTA of the head and neck are negative.  Patient does have risk factors for vascular disease given his medical comorbidities and he will be admitted to the  neurology floor for q. 4 hour neurology checks.  We will keep him on a telemetry monitor to assess for any evidence of cardiac dysrhythmias.  I will increase his home statin to a high intensity dose, start him on aspirin, check an MRI and an echocardiogram.  He will be allowed permissive hypertension overnight.        Atypical chest pain   Assessment & Plan    Patient describes a upper chest pain and given his comorbidities as well as his age, this is concerning for possible anginal equivalent.  He will be monitored on telemetry  noted above, I will trend troponin levels and obtain serial EKGs.  He will be continued on his home statin, started on an aspirin, and be offered as needed morphine, oxygen, and nitroglycerin.  I will check a TSH and a lipid panel.  If his troponin levels remain negative by the morning, then I will further risk stratify him with a nuclear medicine stress test.        Essential hypertension- (present on admission)   Assessment & Plan    Blood pressures currently well controlled, holding home Prinivil to allow for permissive hypertension overnight.        Dyslipidemia- (present on admission)   Assessment & Plan    This is chronic, treatment as noted above.          Prophylaxis: Sequential compression devices for DVT prophylaxis, no PPI indicated, bowel protocol as needed

## 2018-07-10 NOTE — ASSESSMENT & PLAN NOTE
Patient describes a upper chest pain and given his comorbidities as well as his age, this is concerning for possible anginal equivalent.  He will be monitored on telemetry  noted above, I will trend troponin levels and obtain serial EKGs.  He will be continued on his home statin, started on an aspirin, and be offered as needed morphine, oxygen, and nitroglycerin.  I will check a TSH and a lipid panel.  If his troponin levels remain negative by the morning, then I will further risk stratify him with a nuclear medicine stress test.

## 2018-07-10 NOTE — PROGRESS NOTES
2223 Pt into unit from ED via w/c transported by CDU RN on cardiac monitor. Pt AOx4, ambulatory w/ steady gait. Weight and VS taken. Patient oriented to unit, room and BR location. Attached to cardiac monitoring. Admit profile and initial assessment done. Denies any pain, numbness and tingling at this time. Plan of care discussed w/ patient, verbalized understanding. Safety and comfort measures provided. Fall precautions in place. Patient questions answered. Call light within reach. Will continue to assess and monitor.    2250 Blood specimen drawn and sent to lab.

## 2018-07-10 NOTE — ASSESSMENT & PLAN NOTE
Symptoms have primarily resolved.  CTA of the head and neck are negative.  Patient does have risk factors for vascular disease given his medical comorbidities and he will be admitted to the neurology floor for q. 4 hour neurology checks.  We will keep him on a telemetry monitor to assess for any evidence of cardiac dysrhythmias.  I will increase his home statin to a high intensity dose, start him on aspirin, check an MRI and an echocardiogram.  He will be allowed permissive hypertension overnight.

## 2018-07-10 NOTE — PROGRESS NOTES
IV Dc 'd. Discharge instructions provided to patient. Pt verbalizes understanding. Pt states all questions have been answered. Copy of DC provided to patient. Signed copy in chart. 1 prescription sent to pharmacy. Pt states all personal belongings are in possession.  Pt escorted off unit by this RN without incident.

## 2018-07-10 NOTE — PROGRESS NOTES
Assessment completed. Pt A&Ox4. Respirations are even and unlabored on RA. Pt denies chest pain at this time. Denies numbness/tingling. Patient states all s/s have resolved. Monitors applied, VS stable, call light and belongings within reach. POC updated. Pt educated on room and call light, pt verbalized understanding. Communication board updated. Needs met. NPO for stress test.

## 2018-07-10 NOTE — DISCHARGE INSTRUCTIONS
Discharge Instructions    Discharged to home by car with relative. Discharged via walking, hospital escort: Refused.  Special equipment needed: Not Applicable    Be sure to schedule a follow-up appointment with your primary care doctor or any specialists as instructed.     Discharge Plan:   Diet Plan: Discussed  Activity Level: Discussed  Confirmed Follow up Appointment: Patient to Call and Schedule Appointment  Confirmed Symptoms Management: Discussed  Medication Reconciliation Updated: Yes  Influenza Vaccine Indication: Patient Refuses, Not indicated: Previously immunized this influenza season and > 8 years of age    I understand that a diet low in cholesterol, fat, and sodium is recommended for good health. Unless I have been given specific instructions below for another diet, I accept this instruction as my diet prescription.   Other diet: heart healthy    Special Instructions: None    · Is patient discharged on Warfarin / Coumadin?   No               Depression / Suicide Risk    As you are discharged from this [facility] facility, it is important to learn how to keep safe from harming yourself.    Recognize the warning signs:  · Abrupt changes in personality, positive or negative- including increase in energy   · Giving away possessions  · Change in eating patterns- significant weight changes-  positive or negative  · Change in sleeping patterns- unable to sleep or sleeping all the time   · Unwillingness or inability to communicate  · Depression  · Unusual sadness, discouragement and loneliness  · Talk of wanting to die  · Neglect of personal appearance   · Rebelliousness- reckless behavior  · Withdrawal from people/activities they love  · Confusion- inability to concentrate     If you or a loved one observes any of these behaviors or has concerns about self-harm, here's what you can do:  · Talk about it- your feelings and reasons for harming yourself  · Remove any means that you might use to hurt yourself  (examples: pills, rope, extension cords, firearm)  · Get professional help from the community (Mental Health, Substance Abuse, psychological counseling)  · Do not be alone:Call your Safe Contact- someone whom you trust who will be there for you.  · Call your local CRISIS HOTLINE 730-2364 or 870-142-8991  · Call your local Children's Mobile Crisis Response Team [place] (770) 706-6615 or www.[employer]  · Call the toll free National Suicide Prevention Hotlines   · National Suicide Prevention Lifeline 583-234-PEXH (0323)  · National Hope Line Network 800-SUICIDE (301-1002)

## 2018-07-10 NOTE — ASSESSMENT & PLAN NOTE
Blood pressures currently well controlled, holding home Prinivil to allow for permissive hypertension overnight.

## 2018-07-10 NOTE — DISCHARGE PLANNING
Care Transition Team Assessment    Information Source  Orientation : Oriented x 4  Information Given By: Patient  Informant's Name:  (Michael Pradhan)  Who is responsible for making decisions for patient? : Patient    Readmission Evaluation  Is this a readmission?: No    Elopement Risk  Legal Hold: No  Ambulatory or Self Mobile in Wheelchair: No-Not an Elopement Risk  Elopement Risk: Not at Risk for Elopement    Interdisciplinary Discharge Planning  Does Admitting Nurse Feel This Could be a Complex Discharge?: No  Primary Care Physician:  (Socrates MARC)  Lives with - Patient's Self Care Capacity: Spouse  Patient or legal guardian wants to designate a caregiver (see row info): No  Support Systems: Family Member(s), Spouse / Significant Other  Housing / Facility: [address]  Do You Take your Prescribed Medications Regularly: Yes  Able to Return to Previous ADL's: Yes  Mobility Issues: No  Prior Services: None  Patient Expects to be Discharged to::  (Home)  Assistance Needed: No  Durable Medical Equipment: Not Applicable    Discharge Preparedness  What is your plan after discharge?:  (Home)  What are your discharge supports?: Spouse  Prior Functional Level: Ambulatory  Difficulity with ADLs: None  Difficulity with IADLs: None    Functional Assesment  Prior Functional Level: Ambulatory    Finances  Financial Barriers to Discharge: No  Prescription Coverage: Yes    Vision / Hearing Impairment  Vision Impairment : No  Hearing Impairment : No         Advance Directive  Advance Directive?: Living Will (At home)    Domestic Abuse  Have you ever been the victim of abuse or violence?: No  Physical Abuse or Sexual Abuse: No  Verbal Abuse or Emotional Abuse: No  Possible Abuse Reported to:: Not Applicable    Psychological Assessment  History of Substance Abuse: None  History of Psychiatric Problems: No  Non-compliant with Treatment: No  Newly Diagnosed Illness: No    Discharge Risks or Barriers  Discharge risks or  barriers?: No    Anticipated Discharge Information  Anticipated discharge disposition: Home  Discharge Address:  ([address] [place] [address] [place],  [place])  Discharge Contact Phone Number:  (Ray Jameljerad  (spouse) [phone])

## 2018-07-16 ENCOUNTER — OFFICE VISIT (OUTPATIENT)
Dept: MEDICAL GROUP | Facility: MEDICAL CENTER | Age: 78
End: 2018-07-16
Payer: MEDICARE

## 2018-07-16 VITALS
DIASTOLIC BLOOD PRESSURE: 74 MMHG | BODY MASS INDEX: 31.07 KG/M2 | RESPIRATION RATE: 16 BRPM | SYSTOLIC BLOOD PRESSURE: 126 MMHG | OXYGEN SATURATION: 98 % | HEIGHT: 70 IN | WEIGHT: 217 LBS | HEART RATE: 65 BPM

## 2018-07-16 DIAGNOSIS — R73.01 IMPAIRED FASTING GLUCOSE: ICD-10-CM

## 2018-07-16 DIAGNOSIS — E78.5 DYSLIPIDEMIA: ICD-10-CM

## 2018-07-16 DIAGNOSIS — R07.89 ATYPICAL CHEST PAIN: ICD-10-CM

## 2018-07-16 DIAGNOSIS — I10 ESSENTIAL HYPERTENSION: ICD-10-CM

## 2018-07-16 DIAGNOSIS — E66.9 OBESITY (BMI 30-39.9): ICD-10-CM

## 2018-07-16 PROCEDURE — 99214 OFFICE O/P EST MOD 30 MIN: CPT | Performed by: NURSE PRACTITIONER

## 2018-07-16 NOTE — PROGRESS NOTES
Subjective:      Michael Pradhan is a 78 y.o. male who presents with Hospital Follow-up        CC: Patient is here today for hospital follow-up for chest pain.    HPI Michael Pradhan      1. Atypical chest pain  Patient went to the emergency room on July 9 when he noticed in the morning a tingling of his left arm with some numbness as well as chest pain. The tingling resolved but the chest pain continued despite taking 4 baby aspirin. He had extensive workup overnight at the hospital which included normal EKG and nuclear stress test. Echocardiogram showed ejection fraction of 60% and CTA of the head and neck and MRI of the head were also negative for stroke. He was advised this could've been a TIA although it also could've been related to muscle strain in the neck. He states as of now he has had no further chest pain or tingling symptoms. He is now taking a baby aspirin daily. He had been on aspirin in the past but it had caused nosebleeds. Troponins were negative.    2. Impaired fasting glucose  Most recent hemoglobin A1c came back mildly elevated at 5.7 and he denies any diabetic symptoms.    3. Dyslipidemia  Patient continues on his Lipitor 10 mg and his LDL was 57 at the hospital with total cholesterol of 119.    4. Essential hypertension  Blood pressure has remained well controlled on his low-dose lisinopril.      Social History   Substance Use Topics   • Smoking status: Former Smoker     Packs/day: 1.00     Years: 5.00     Types: Cigarettes     Quit date: 2/7/1969   • Smokeless tobacco: Never Used      Comment: Started smoking at age [age] quit started again at [age]   • Alcohol use 0.0 - 1.2 oz/week     Current Outpatient Prescriptions   Medication Sig Dispense Refill   • aspirin EC 81 MG EC tablet Take 1 Tab by mouth every day. 30 Tab    • lisinopril (PRINIVIL) 10 MG Tab Take 1 Tab by mouth every day. 30 Tab 10   • atorvastatin (LIPITOR) 10 MG Tab Take 1 Tab by mouth every day. 30 Tab 10   •  "vitamin D (CHOLECALCIFEROL) 1000 UNIT Tab Take 1,000 Units by mouth every day.       No current facility-administered medications for this visit.      Family History   Problem Relation Age of Onset   • Diabetes Mother    • Diabetes Sister    • Other Brother      Spinal Miningitis   • Schizophrenia Brother    • Diabetes Sister    • Dementia Maternal Grandmother    • Dementia Paternal Grandmother    • Colon Cancer Paternal Grandfather    • Cancer Brother      Esophigial     Past Medical History:   Diagnosis Date   • Hyperlipidemia    • Hypertension        Review of Systems   All other systems reviewed and are negative.         Objective:     /74   Pulse 65   Resp 16   Ht 1.778 m (5' 10\")   Wt 98.4 kg (217 lb)   SpO2 98%   BMI 31.14 kg/m²      Physical Exam   Constitutional: He is oriented to person, place, and time. He appears well-developed and well-nourished. No distress.   HENT:   Head: Normocephalic and atraumatic.   Right Ear: External ear normal.   Left Ear: External ear normal.   Nose: Nose normal.   Mouth/Throat: Oropharynx is clear and moist.   Eyes: Conjunctivae are normal. Right eye exhibits no discharge. Left eye exhibits no discharge.   Neck: Normal range of motion. Neck supple. No tracheal deviation present. No thyromegaly present.   Cardiovascular: Normal rate and regular rhythm.    Murmur heard.  Pulmonary/Chest: Effort normal and breath sounds normal. No respiratory distress. He has no wheezes. He has no rales.   Lymphadenopathy:     He has no cervical adenopathy.   Neurological: He is alert and oriented to person, place, and time. Coordination normal.   Skin: Skin is warm and dry. No rash noted. He is not diaphoretic. No erythema.   Psychiatric: He has a normal mood and affect. His behavior is normal. Judgment and thought content normal.   Nursing note and vitals reviewed.              Assessment/Plan:     1. Atypical chest pain  Patient had multiple tests done at the hospital which " included EKG, troponin studies, chest x-ray, nuclear medical stress test, echocardiogram, CT-CTA of the head and neck and MRI and there was no evidence of stroke or heart attack. Patient has been asymptomatic since then. I explained that there was possibility of TIA but I suspect more of a musculoskeletal cause of his tingling. His chest pain showed no evidence of cardiac cause and he has had no symptoms since then. He will continue with his statin, ACE inhibitor and aspirin if he can tolerate it.    2. Impaired fasting glucose  I reviewed with patient that his blood sugar average was just mildly high and he should watch his carbohydrates.    3. Dyslipidemia  Low-dose Lipitor appears to be working well.    4. Essential hypertension  The pressure well controlled on current low-dose ACE inhibitor.    5. Obesity (BMI 30-39.9)    - Patient identified as having weight management issue.  Appropriate orders and counseling given.

## 2018-08-28 ENCOUNTER — PATIENT OUTREACH (OUTPATIENT)
Dept: HEALTH INFORMATION MANAGEMENT | Facility: OTHER | Age: 78
End: 2018-08-28

## 2018-08-30 RX ORDER — ATORVASTATIN CALCIUM 10 MG/1
TABLET, FILM COATED ORAL
Qty: 30 TAB | Refills: 10 | Status: SHIPPED | OUTPATIENT
Start: 2018-08-30 | End: 2019-06-01 | Stop reason: SDUPTHER

## 2018-08-30 RX ORDER — LISINOPRIL 10 MG/1
TABLET ORAL
Qty: 30 TAB | Refills: 10 | Status: SHIPPED | OUTPATIENT
Start: 2018-08-30 | End: 2019-05-27 | Stop reason: SDUPTHER

## 2018-10-08 ENCOUNTER — PATIENT OUTREACH (OUTPATIENT)
Dept: HEALTH INFORMATION MANAGEMENT | Facility: OTHER | Age: 78
End: 2018-10-08

## 2018-10-08 NOTE — PROGRESS NOTES
Outcome: Left Message    Please transfer to Patient Outreach Team at [phone] when patient returns call.    WebIZ Checked & Epic Updated:  yes    HealthConnect Verified: yes    Attempt # 1

## 2018-10-25 ENCOUNTER — APPOINTMENT (OUTPATIENT)
Dept: MEDICAL GROUP | Facility: MEDICAL CENTER | Age: 78
End: 2018-10-25
Payer: MEDICARE

## 2018-10-25 DIAGNOSIS — Z23 INFLUENZA VACCINE NEEDED: ICD-10-CM

## 2018-10-29 NOTE — PROGRESS NOTES
Attempt #:Final  Verify PCP: yes    Communication Preference Obtained: yes   Annual Wellness Visit Scheduling  1. Scheduling Status:Not Scheduled. Patient states they are not interested       Care Gap Scheduling (Attempt to Schedule EACH Overdue Care Gap!)  Health Maintenance Due   Topic Date Due   • IMM ZOSTER VACCINES (2 of 3) 05/02/2013   • Annual Wellness Visit  07/25/2018     MyChart Activation: already active

## 2018-12-05 ENCOUNTER — APPOINTMENT (RX ONLY)
Dept: URBAN - METROPOLITAN AREA CLINIC 20 | Facility: CLINIC | Age: 78
Setting detail: DERMATOLOGY
End: 2018-12-05

## 2018-12-05 DIAGNOSIS — D18.0 HEMANGIOMA: ICD-10-CM

## 2018-12-05 DIAGNOSIS — L82.1 OTHER SEBORRHEIC KERATOSIS: ICD-10-CM

## 2018-12-05 DIAGNOSIS — L21.8 OTHER SEBORRHEIC DERMATITIS: ICD-10-CM

## 2018-12-05 DIAGNOSIS — D22 MELANOCYTIC NEVI: ICD-10-CM

## 2018-12-05 DIAGNOSIS — L57.0 ACTINIC KERATOSIS: ICD-10-CM

## 2018-12-05 DIAGNOSIS — L81.4 OTHER MELANIN HYPERPIGMENTATION: ICD-10-CM

## 2018-12-05 DIAGNOSIS — L57.8 OTHER SKIN CHANGES DUE TO CHRONIC EXPOSURE TO NONIONIZING RADIATION: ICD-10-CM

## 2018-12-05 PROBLEM — D18.01 HEMANGIOMA OF SKIN AND SUBCUTANEOUS TISSUE: Status: ACTIVE | Noted: 2018-12-05

## 2018-12-05 PROBLEM — D22.5 MELANOCYTIC NEVI OF TRUNK: Status: ACTIVE | Noted: 2018-12-05

## 2018-12-05 PROCEDURE — ? COUNSELING

## 2018-12-05 PROCEDURE — ? LIQUID NITROGEN

## 2018-12-05 PROCEDURE — 17000 DESTRUCT PREMALG LESION: CPT

## 2018-12-05 PROCEDURE — 99214 OFFICE O/P EST MOD 30 MIN: CPT | Mod: 25

## 2018-12-05 ASSESSMENT — LOCATION ZONE DERM
LOCATION ZONE: SCALP
LOCATION ZONE: TRUNK
LOCATION ZONE: LEG
LOCATION ZONE: FACE
LOCATION ZONE: ARM

## 2018-12-05 ASSESSMENT — LOCATION DETAILED DESCRIPTION DERM
LOCATION DETAILED: RIGHT ANTERIOR PROXIMAL UPPER ARM
LOCATION DETAILED: LEFT PROXIMAL DORSAL FOREARM
LOCATION DETAILED: LEFT ANTERIOR DISTAL THIGH
LOCATION DETAILED: RIGHT MID-UPPER BACK
LOCATION DETAILED: RIGHT CENTRAL MALAR CHEEK
LOCATION DETAILED: RIGHT SUPERIOR MEDIAL UPPER BACK
LOCATION DETAILED: RIGHT ANTERIOR DISTAL THIGH
LOCATION DETAILED: RIGHT PROXIMAL DORSAL FOREARM
LOCATION DETAILED: LEFT INFERIOR CENTRAL MALAR CHEEK
LOCATION DETAILED: LEFT MEDIAL FOREHEAD
LOCATION DETAILED: LEFT PROXIMAL LATERAL POSTERIOR UPPER ARM
LOCATION DETAILED: LEFT MEDIAL SUPERIOR CHEST
LOCATION DETAILED: RIGHT INFERIOR FOREHEAD
LOCATION DETAILED: LEFT ANTERIOR PROXIMAL UPPER ARM
LOCATION DETAILED: POSTERIOR MID-PARIETAL SCALP
LOCATION DETAILED: RIGHT DISTAL POSTERIOR UPPER ARM
LOCATION DETAILED: RIGHT INFERIOR UPPER BACK

## 2018-12-05 ASSESSMENT — LOCATION SIMPLE DESCRIPTION DERM
LOCATION SIMPLE: RIGHT FOREHEAD
LOCATION SIMPLE: RIGHT UPPER ARM
LOCATION SIMPLE: POSTERIOR SCALP
LOCATION SIMPLE: LEFT THIGH
LOCATION SIMPLE: LEFT FOREHEAD
LOCATION SIMPLE: RIGHT FOREARM
LOCATION SIMPLE: CHEST
LOCATION SIMPLE: LEFT UPPER ARM
LOCATION SIMPLE: RIGHT THIGH
LOCATION SIMPLE: LEFT FOREARM
LOCATION SIMPLE: LEFT CHEEK
LOCATION SIMPLE: RIGHT CHEEK
LOCATION SIMPLE: RIGHT UPPER BACK

## 2018-12-05 NOTE — PROCEDURE: LIQUID NITROGEN
Consent: The patient's consent was obtained including but not limited to risks of crusting, scabbing, blistering, scarring, darker or lighter pigmentary change, recurrence, incomplete removal and infection.
Post-Care Instructions: I reviewed with the patient in detail post-care instructions. Patient is to wear sunprotection, and avoid picking at any of the treated lesions. Pt may apply Vaseline to crusted or scabbing areas.
Render Post-Care Instructions In Note?: no
Duration Of Freeze Thaw-Cycle (Seconds): 4
Detail Level: Detailed

## 2019-05-16 ENCOUNTER — OFFICE VISIT (OUTPATIENT)
Dept: MEDICAL GROUP | Facility: MEDICAL CENTER | Age: 79
End: 2019-05-16
Payer: MEDICARE

## 2019-05-16 VITALS
WEIGHT: 210 LBS | TEMPERATURE: 98.1 F | SYSTOLIC BLOOD PRESSURE: 124 MMHG | RESPIRATION RATE: 16 BRPM | OXYGEN SATURATION: 97 % | HEIGHT: 70 IN | HEART RATE: 66 BPM | BODY MASS INDEX: 30.06 KG/M2 | DIASTOLIC BLOOD PRESSURE: 76 MMHG

## 2019-05-16 DIAGNOSIS — R73.01 IMPAIRED FASTING GLUCOSE: ICD-10-CM

## 2019-05-16 DIAGNOSIS — R01.1 HEART MURMUR: ICD-10-CM

## 2019-05-16 DIAGNOSIS — E55.9 VITAMIN D DEFICIENCY DISEASE: ICD-10-CM

## 2019-05-16 DIAGNOSIS — K40.21 BILATERAL RECURRENT INGUINAL HERNIA WITHOUT OBSTRUCTION OR GANGRENE: ICD-10-CM

## 2019-05-16 DIAGNOSIS — Z00.00 MEDICARE ANNUAL WELLNESS VISIT, SUBSEQUENT: ICD-10-CM

## 2019-05-16 DIAGNOSIS — J01.10 ACUTE NON-RECURRENT FRONTAL SINUSITIS: ICD-10-CM

## 2019-05-16 DIAGNOSIS — I10 ESSENTIAL HYPERTENSION: ICD-10-CM

## 2019-05-16 DIAGNOSIS — E66.9 OBESITY (BMI 30-39.9): ICD-10-CM

## 2019-05-16 DIAGNOSIS — E78.5 DYSLIPIDEMIA: ICD-10-CM

## 2019-05-16 PROBLEM — K40.20 BILATERAL INGUINAL HERNIA WITHOUT OBSTRUCTION OR GANGRENE: Status: RESOLVED | Noted: 2017-07-24 | Resolved: 2019-05-16

## 2019-05-16 PROCEDURE — 99213 OFFICE O/P EST LOW 20 MIN: CPT | Mod: 25 | Performed by: NURSE PRACTITIONER

## 2019-05-16 PROCEDURE — G0439 PPPS, SUBSEQ VISIT: HCPCS | Performed by: NURSE PRACTITIONER

## 2019-05-16 RX ORDER — FLUTICASONE PROPIONATE 50 MCG
2 SPRAY, SUSPENSION (ML) NASAL DAILY
Qty: 16 G | Refills: 11 | Status: ON HOLD
Start: 2019-05-16 | End: 2020-01-22

## 2019-05-16 RX ORDER — AMOXICILLIN 875 MG/1
875 TABLET, COATED ORAL 2 TIMES DAILY
Qty: 20 TAB | Refills: 0 | Status: SHIPPED | OUTPATIENT
Start: 2019-05-16 | End: 2019-05-26

## 2019-05-16 RX ORDER — AZELASTINE 1 MG/ML
1 SPRAY, METERED NASAL 2 TIMES DAILY
Qty: 30 ML | Refills: 11 | Status: ON HOLD
Start: 2019-05-16 | End: 2020-01-22

## 2019-05-16 ASSESSMENT — ENCOUNTER SYMPTOMS: GENERAL WELL-BEING: GOOD

## 2019-05-16 ASSESSMENT — ACTIVITIES OF DAILY LIVING (ADL): BATHING_REQUIRES_ASSISTANCE: 0

## 2019-05-16 ASSESSMENT — PATIENT HEALTH QUESTIONNAIRE - PHQ9: CLINICAL INTERPRETATION OF PHQ2 SCORE: 0

## 2019-05-16 NOTE — PROGRESS NOTES
CC: Patient here today for sinus complaints as well as for follow-up and annual Medicare wellness visit.    HPI:   Michael presents today with the following.    1. Medicare annual wellness visit, subsequent  Screening performed below.    2. Acute non-recurrent frontal sinusitis  Patient states his symptoms started approximately a month ago.  Initially he thought it was just a cold but symptoms have continued for the full 4 weeks and now he is having more rhinorrhea, sinus congestion, mild sinus pain and thickening discharge.  He has tried over-the-counter antihistamines for a few days but they have not helped.  He denies fever, chills, ear pain or headache.  Nothing makes him feel better or worse.  He denies history of allergies.    3. Dyslipidemia  Patient continues on his statin and reports no side effects.  His last LDL from July was 57.    4. Essential hypertension  Blood pressure is been controlled on his lisinopril 10 mg and he reports no cough.    5. Vitamin D deficiency disease  History of vitamin D deficiency and he is taking over-the-counter vitamin D but needs recheck on numbers.    6. Obesity (BMI 30-39.9)  Weight has remained fairly stable.    7. Bilateral recurrent inguinal hernia without obstruction or gangrene  Patient states he gets just occasional discomfort in the abdomen but not bad enough to want to go forward with surgery.    8. Impaired fasting glucose  Previous hemoglobin A1c was mildly elevated at 5.7 and he states he is watching his carbohydrates.    9. Heart murmur  History of heart murmur and patient was seen in the hospital in July of last year for chest pain.  His heart work-up at the time showed negative troponins and normal EKG.  Nuclear medicine stress test was negative for ischemia.  His echocardiogram showed ejection fraction at 60%.  He states currently he has no symptoms although murmur is still present.      Depression Screening    Little interest or pleasure in doing things?  0  - not at all  Feeling down, depressed , or hopeless? 0 - not at all  Patient Health Questionnaire Score: 0     If depressive symptoms identified deferred to follow up visit unless specifically addressed in assessment and plan.    Interpretation of PHQ-9 Total Score   Score Severity   1-4 No Depression   5-9 Mild Depression   10-14 Moderate Depression   15-19 Moderately Severe Depression   20-27 Severe Depression    Screening for Cognitive Impairment    Three Minute Recall (village, kitchen, baby) 3/3    Jamaal clock face with all 12 numbers and set the hands to show 10 past 10.  Yes    Cognitive concerns identified deferred for follow up unless specifically addressed in assessment and plan.    Fall Risk Assessment    Has the patient had two or more falls in the last year or any fall with injury in the last year?  No    Safety Assessment    Throw rugs on floor.  Yes  Handrails on all stairs.  Yes  Good lighting in all hallways.  Yes  Difficulty hearing.  No  Patient counseled about all safety risks that were identified.    Functional Assessment ADLs    Are there any barriers preventing you from cooking for yourself or meeting nutritional needs?  No.    Are there any barriers preventing you from driving safely or obtaining transportation?  No.    Are there any barriers preventing you from using a telephone or calling for help?  No.    Are there any barriers preventing you from shopping?  No.    Are there any barriers preventing you from taking care of your own finances?  No.    Are there any barriers preventing you from managing your medications?  No.    Are there any barriers preventing you from showering, bathing or dressing yourself?  No.    Are you currently engaging in any exercise or physical activity?  Yes.     What is your perception of your health?  Good.      Health Maintenance Summary                Annual Wellness Visit Overdue 7/25/2018      Done 7/24/2017 Visit Dx: Medicare annual wellness visit,  subsequent    IMM ZOSTER VACCINES Postponed 5/16/2024 Originally 5/2/2013. System: vaccine not available, other system reasons     Done 3/7/2013 Imm Admin: Zoster Vaccine Live (ZVL) (Zostavax)    COLONOSCOPY Next Due 2/9/2026      Done 2/9/2016     IMM DTaP/Tdap/Td Vaccine Next Due 7/24/2027      Done 7/24/2017 Imm Admin: Tdap Vaccine          Patient Care Team:  ESMER Andres as PCP - General (Family Medicine)  Dr. Harjinder Schwartz as Consulting Physician (Internal Medicine)  Dr. Leif Casas as Consulting Physician (Gastroenterology)  Yovany Smith M.D. as Consulting Physician (Ophthalmology)  Danisha Zuluaga P.A.-C. as Consulting Physician (Dermatology)          Patient Active Problem List    Diagnosis Date Noted   • Bilateral recurrent inguinal hernia without obstruction or gangrene 05/16/2019   • Heart murmur 05/16/2019   • Impaired fasting glucose 07/16/2018   • H/O bee sting allergy 07/24/2017   • Dyslipidemia 02/17/2017   • Essential hypertension 02/17/2017   • Vitamin D deficiency disease 02/17/2017   • Obesity (BMI 30-39.9) 02/17/2017       Current Outpatient Prescriptions   Medication Sig Dispense Refill   • fluticasone (FLONASE) 50 MCG/ACT nasal spray Spray 2 Sprays in nose every day. 16 g 11   • azelastine (ASTELIN) 137 MCG/SPRAY nasal spray [place] 1 Spray in nose 2 times a day. 30 mL 11   • amoxicillin (AMOXIL) 875 MG tablet Take 1 Tab by mouth 2 times a day for 10 days. 20 Tab 0   • atorvastatin (LIPITOR) 10 MG Tab TAKE 1 TABLET DAILY 30 Tab 10   • lisinopril (PRINIVIL) 10 MG Tab TAKE 1 TABLET DAILY 30 Tab 10   • vitamin D (CHOLECALCIFEROL) 1000 UNIT Tab Take 1,000 Units by mouth every day.       No current facility-administered medications for this visit.          Allergies as of 05/16/2019 - Reviewed 05/16/2019   Allergen Reaction Noted   • Bee  07/09/2018        ROS: As per HPI.    /76 (BP Location: Right arm, Patient Position: Sitting, BP Cuff Size: Adult)   Pulse 66   Temp 36.7 °C (98.1  "°F) (Temporal)   Resp 16   Ht 1.778 m (5' 10\")   Wt 95.3 kg (210 lb)   SpO2 97%   BMI 30.13 kg/m²     Physical Exam:  Gen:         Alert and oriented, No apparent distress.  Neck:        No Lymphadenopathy or Bruits.  Lungs:     Clear to auscultation bilaterally  CV:          Regular rate and rhythm.  Positive murmur  Abd:         Soft non tender, non distended. Normal active bowel sounds.  No  Hepatosplenomegaly, No pulsatile masses.                   Ext:          No clubbing, cyanosis, edema.      Assessment and Plan.   79 y.o. male with the following issues.    1. Medicare annual wellness visit, subsequent  Annual wellness topics discussed, review of chronic medical problems completed    - Subsequent Annual Wellness Visit - Includes PPPS ()    2. Acute non-recurrent frontal sinusitis  Patient symptoms might be allergy related so I will have him first try an antihistamine nasal spray and a steroid nasal spray.  If symptoms worsen or do not improve then I will have him start on the penicillin.  He will follow-up if no improvement after that.  He will keep hydrated and he may try sinus irrigation.  - fluticasone (FLONASE) 50 MCG/ACT nasal spray; Spray 2 Sprays in nose every day.  Dispense: 16 g; Refill: 11  - azelastine (ASTELIN) 137 MCG/SPRAY nasal spray; Spray 1 Spray in nose 2 times a day.  Dispense: 30 mL; Refill: 11  - amoxicillin (AMOXIL) 875 MG tablet; Take 1 Tab by mouth 2 times a day for 10 days.  Dispense: 20 Tab; Refill: 0    3. Dyslipidemia  Patient will do lab work next month with previous lipid panel good on statin.  - Lipid Profile; Future    4. Essential hypertension  Blood pressure controlled on low-dose ACE inhibitor.  - Comp Metabolic Panel; Future    5. Vitamin D deficiency disease  I will see if patient needs higher dose of vitamin D.  - VITAMIN D,25 HYDROXY; Future    6. Obesity (BMI 30-39.9)  No change in weight and he continues to try to exercise with the better weather.    7. " Bilateral recurrent inguinal hernia without obstruction or gangrene  Patient does not feel he needs to see a surgeon.    8. Impaired fasting glucose  Last hemoglobin A1c of 5.7 and we are monitoring to be sure he does not become a type II diabetic and he tries to follow a low-carb diet.  - HEMOGLOBIN A1C; Future    9. Heart murmur  I discussed options with patient including a follow-up echocardiogram but he states he is asymptomatic with no chest pain or breathing issues so would prefer to wait.

## 2019-05-28 RX ORDER — LISINOPRIL 10 MG/1
TABLET ORAL
Qty: 30 TAB | Refills: 10 | Status: SHIPPED | OUTPATIENT
Start: 2019-05-28 | End: 2020-02-24

## 2019-06-02 RX ORDER — ATORVASTATIN CALCIUM 10 MG/1
TABLET, FILM COATED ORAL
Qty: 30 TAB | Refills: 5 | Status: SHIPPED | OUTPATIENT
Start: 2019-06-02 | End: 2019-11-29 | Stop reason: SDUPTHER

## 2019-07-29 ENCOUNTER — HOSPITAL ENCOUNTER (OUTPATIENT)
Dept: LAB | Facility: MEDICAL CENTER | Age: 79
End: 2019-07-29
Attending: NURSE PRACTITIONER
Payer: MEDICARE

## 2019-07-29 DIAGNOSIS — I10 ESSENTIAL HYPERTENSION: ICD-10-CM

## 2019-07-29 DIAGNOSIS — R73.01 IMPAIRED FASTING GLUCOSE: ICD-10-CM

## 2019-07-29 DIAGNOSIS — E78.5 DYSLIPIDEMIA: ICD-10-CM

## 2019-07-29 DIAGNOSIS — E55.9 VITAMIN D DEFICIENCY DISEASE: ICD-10-CM

## 2019-07-29 LAB
EST. AVERAGE GLUCOSE BLD GHB EST-MCNC: 105 MG/DL
HBA1C MFR BLD: 5.3 % (ref 0–5.6)

## 2019-07-29 PROCEDURE — 80061 LIPID PANEL: CPT

## 2019-07-29 PROCEDURE — 36415 COLL VENOUS BLD VENIPUNCTURE: CPT

## 2019-07-29 PROCEDURE — 83036 HEMOGLOBIN GLYCOSYLATED A1C: CPT

## 2019-07-29 PROCEDURE — 80053 COMPREHEN METABOLIC PANEL: CPT

## 2019-07-29 PROCEDURE — 82306 VITAMIN D 25 HYDROXY: CPT

## 2019-07-30 LAB
25(OH)D3 SERPL-MCNC: 38 NG/ML (ref 30–100)
ALBUMIN SERPL BCP-MCNC: 4.1 G/DL (ref 3.2–4.9)
ALBUMIN/GLOB SERPL: 1.6 G/DL
ALP SERPL-CCNC: 53 U/L (ref 30–99)
ALT SERPL-CCNC: 24 U/L (ref 2–50)
ANION GAP SERPL CALC-SCNC: 6 MMOL/L (ref 0–11.9)
AST SERPL-CCNC: 23 U/L (ref 12–45)
BILIRUB SERPL-MCNC: 1.1 MG/DL (ref 0.1–1.5)
BUN SERPL-MCNC: 14 MG/DL (ref 8–22)
CALCIUM SERPL-MCNC: 9.5 MG/DL (ref 8.5–10.5)
CHLORIDE SERPL-SCNC: 106 MMOL/L (ref 96–112)
CHOLEST SERPL-MCNC: 125 MG/DL (ref 100–199)
CO2 SERPL-SCNC: 28 MMOL/L (ref 20–33)
CREAT SERPL-MCNC: 0.9 MG/DL (ref 0.5–1.4)
FASTING STATUS PATIENT QL REPORTED: NORMAL
GLOBULIN SER CALC-MCNC: 2.6 G/DL (ref 1.9–3.5)
GLUCOSE SERPL-MCNC: 96 MG/DL (ref 65–99)
HDLC SERPL-MCNC: 47 MG/DL
LDLC SERPL CALC-MCNC: 63 MG/DL
POTASSIUM SERPL-SCNC: 4 MMOL/L (ref 3.6–5.5)
PROT SERPL-MCNC: 6.7 G/DL (ref 6–8.2)
SODIUM SERPL-SCNC: 140 MMOL/L (ref 135–145)
TRIGL SERPL-MCNC: 77 MG/DL (ref 0–149)

## 2019-12-01 RX ORDER — ATORVASTATIN CALCIUM 10 MG/1
TABLET, FILM COATED ORAL
Qty: 30 TAB | Refills: 6 | Status: SHIPPED | OUTPATIENT
Start: 2019-12-01 | End: 2020-05-18 | Stop reason: SDUPTHER

## 2019-12-31 DIAGNOSIS — K40.90 INGUINAL HERNIA WITHOUT OBSTRUCTION OR GANGRENE, RECURRENCE NOT SPECIFIED, UNSPECIFIED LATERALITY: ICD-10-CM

## 2020-01-16 ENCOUNTER — PATIENT OUTREACH (OUTPATIENT)
Dept: HEALTH INFORMATION MANAGEMENT | Facility: OTHER | Age: 80
End: 2020-01-16

## 2020-01-16 ENCOUNTER — PATIENT MESSAGE (OUTPATIENT)
Dept: HEALTH INFORMATION MANAGEMENT | Facility: OTHER | Age: 80
End: 2020-01-16

## 2020-01-16 NOTE — PROGRESS NOTES
1. HealthConnect Verified: yes    2. Verify PCP: yes    3. Review and add  to Care Team: yes    4.  Reviewed/Updated the following with patient:       •   Communication Preference Obtained? YES  • MyChart Activation: already active       •   E-Mail Address Obtained? YES       •   Appointment Day and Time Preferences? YES       •   Preferred Pharmacy? YES       •   Preferred Lab? YES    6. Care Gap Scheduling (Attempt to Schedule EACH Overdue Care Gap!)      [facility] PA introduction completed/ Pt stated that he will have a surgery next week, but it seems that [facility] does not have the prior authorization for it/ I did review pt's info and it seems that [facility] has't get it yet. // Adv pt to contact his surgeon and resend the Prior A. Pt stated that has no more questions or concerns at this time.

## 2020-01-17 DIAGNOSIS — Z01.810 PRE-OPERATIVE CARDIOVASCULAR EXAMINATION: ICD-10-CM

## 2020-01-17 DIAGNOSIS — Z01.812 PRE-OPERATIVE LABORATORY EXAMINATION: ICD-10-CM

## 2020-01-17 LAB
ANION GAP SERPL CALC-SCNC: 7 MMOL/L (ref 0–11.9)
BUN SERPL-MCNC: 18 MG/DL (ref 8–22)
CALCIUM SERPL-MCNC: 9.9 MG/DL (ref 8.5–10.5)
CHLORIDE SERPL-SCNC: 105 MMOL/L (ref 96–112)
CO2 SERPL-SCNC: 27 MMOL/L (ref 20–33)
CREAT SERPL-MCNC: 0.93 MG/DL (ref 0.5–1.4)
EKG IMPRESSION: NORMAL
GLUCOSE SERPL-MCNC: 91 MG/DL (ref 65–99)
POTASSIUM SERPL-SCNC: 4.6 MMOL/L (ref 3.6–5.5)
SODIUM SERPL-SCNC: 139 MMOL/L (ref 135–145)

## 2020-01-17 PROCEDURE — 36415 COLL VENOUS BLD VENIPUNCTURE: CPT

## 2020-01-17 PROCEDURE — 93005 ELECTROCARDIOGRAM TRACING: CPT

## 2020-01-17 PROCEDURE — 80048 BASIC METABOLIC PNL TOTAL CA: CPT

## 2020-01-17 PROCEDURE — 93010 ELECTROCARDIOGRAM REPORT: CPT | Performed by: INTERNAL MEDICINE

## 2020-01-21 RX ORDER — SODIUM CHLORIDE, SODIUM LACTATE, POTASSIUM CHLORIDE, CALCIUM CHLORIDE 600; 310; 30; 20 MG/100ML; MG/100ML; MG/100ML; MG/100ML
INJECTION, SOLUTION INTRAVENOUS CONTINUOUS
Status: CANCELLED | OUTPATIENT
Start: 2020-01-21 | End: 2020-01-22

## 2020-01-22 ENCOUNTER — HOSPITAL ENCOUNTER (OUTPATIENT)
Facility: MEDICAL CENTER | Age: 80
End: 2020-01-22
Attending: COLON & RECTAL SURGERY | Admitting: COLON & RECTAL SURGERY
Payer: MEDICARE

## 2020-01-22 ENCOUNTER — ANESTHESIA EVENT (OUTPATIENT)
Dept: SURGERY | Facility: MEDICAL CENTER | Age: 80
End: 2020-01-22
Payer: MEDICARE

## 2020-01-22 ENCOUNTER — ANESTHESIA (OUTPATIENT)
Dept: SURGERY | Facility: MEDICAL CENTER | Age: 80
End: 2020-01-22
Payer: MEDICARE

## 2020-01-22 VITALS
HEIGHT: 67 IN | HEART RATE: 61 BPM | SYSTOLIC BLOOD PRESSURE: 143 MMHG | DIASTOLIC BLOOD PRESSURE: 71 MMHG | OXYGEN SATURATION: 98 % | BODY MASS INDEX: 34.19 KG/M2 | RESPIRATION RATE: 16 BRPM | TEMPERATURE: 96.9 F | WEIGHT: 217.81 LBS

## 2020-01-22 DIAGNOSIS — G89.18 POST-OP PAIN: ICD-10-CM

## 2020-01-22 PROCEDURE — 700101 HCHG RX REV CODE 250: Performed by: COLON & RECTAL SURGERY

## 2020-01-22 PROCEDURE — 160009 HCHG ANES TIME/MIN: Performed by: COLON & RECTAL SURGERY

## 2020-01-22 PROCEDURE — 700105 HCHG RX REV CODE 258: Performed by: COLON & RECTAL SURGERY

## 2020-01-22 PROCEDURE — 501568 HCHG TROCAR, BLUNTPORT 12MM: Performed by: COLON & RECTAL SURGERY

## 2020-01-22 PROCEDURE — 700111 HCHG RX REV CODE 636 W/ 250 OVERRIDE (IP)

## 2020-01-22 PROCEDURE — 501583 HCHG TROCAR, THRD CAN&SEAL 5X100: Performed by: COLON & RECTAL SURGERY

## 2020-01-22 PROCEDURE — 700101 HCHG RX REV CODE 250: Performed by: ANESTHESIOLOGY

## 2020-01-22 PROCEDURE — 160039 HCHG SURGERY MINUTES - EA ADDL 1 MIN LEVEL 3: Performed by: COLON & RECTAL SURGERY

## 2020-01-22 PROCEDURE — 160002 HCHG RECOVERY MINUTES (STAT): Performed by: COLON & RECTAL SURGERY

## 2020-01-22 PROCEDURE — 160025 RECOVERY II MINUTES (STATS): Performed by: COLON & RECTAL SURGERY

## 2020-01-22 PROCEDURE — 160046 HCHG PACU - 1ST 60 MINS PHASE II: Performed by: COLON & RECTAL SURGERY

## 2020-01-22 PROCEDURE — 501570 HCHG TROCAR, SEPARATOR: Performed by: COLON & RECTAL SURGERY

## 2020-01-22 PROCEDURE — 160028 HCHG SURGERY MINUTES - 1ST 30 MINS LEVEL 3: Performed by: COLON & RECTAL SURGERY

## 2020-01-22 PROCEDURE — 160035 HCHG PACU - 1ST 60 MINS PHASE I: Performed by: COLON & RECTAL SURGERY

## 2020-01-22 PROCEDURE — 501838 HCHG SUTURE GENERAL: Performed by: COLON & RECTAL SURGERY

## 2020-01-22 PROCEDURE — A9270 NON-COVERED ITEM OR SERVICE: HCPCS | Performed by: ANESTHESIOLOGY

## 2020-01-22 PROCEDURE — 700111 HCHG RX REV CODE 636 W/ 250 OVERRIDE (IP): Performed by: ANESTHESIOLOGY

## 2020-01-22 PROCEDURE — 502571 HCHG PACK, LAP CHOLE: Performed by: COLON & RECTAL SURGERY

## 2020-01-22 PROCEDURE — 160048 HCHG OR STATISTICAL LEVEL 1-5: Performed by: COLON & RECTAL SURGERY

## 2020-01-22 PROCEDURE — C1781 MESH (IMPLANTABLE): HCPCS | Performed by: COLON & RECTAL SURGERY

## 2020-01-22 PROCEDURE — 700102 HCHG RX REV CODE 250 W/ 637 OVERRIDE(OP): Performed by: ANESTHESIOLOGY

## 2020-01-22 DEVICE — MESH 3D MAX RIGHT 10.8 X 16CM - MED(1/CA): Type: IMPLANTABLE DEVICE | Site: GROIN | Status: FUNCTIONAL

## 2020-01-22 DEVICE — MESH 3D MAX LEFT 8.5 X 13.7CM - MEDIUM (1EA/CA): Type: IMPLANTABLE DEVICE | Site: GROIN | Status: FUNCTIONAL

## 2020-01-22 RX ORDER — SODIUM CHLORIDE, SODIUM LACTATE, POTASSIUM CHLORIDE, CALCIUM CHLORIDE 600; 310; 30; 20 MG/100ML; MG/100ML; MG/100ML; MG/100ML
INJECTION, SOLUTION INTRAVENOUS CONTINUOUS
Status: DISCONTINUED | OUTPATIENT
Start: 2020-01-22 | End: 2020-01-22 | Stop reason: HOSPADM

## 2020-01-22 RX ORDER — CELECOXIB 200 MG/1
200 CAPSULE ORAL ONCE
Status: COMPLETED | OUTPATIENT
Start: 2020-01-22 | End: 2020-01-22

## 2020-01-22 RX ORDER — OXYCODONE HCL 5 MG/5 ML
5 SOLUTION, ORAL ORAL
Status: COMPLETED | OUTPATIENT
Start: 2020-01-22 | End: 2020-01-22

## 2020-01-22 RX ORDER — BIMATOPROST 0.1 MG/ML
1 SOLUTION/ DROPS OPHTHALMIC
COMMUNITY
Start: 2019-12-27 | End: 2023-01-20

## 2020-01-22 RX ORDER — GABAPENTIN 300 MG/1
300 CAPSULE ORAL
Status: COMPLETED | OUTPATIENT
Start: 2020-01-22 | End: 2020-01-22

## 2020-01-22 RX ORDER — CEFAZOLIN SODIUM 1 G/3ML
INJECTION, POWDER, FOR SOLUTION INTRAMUSCULAR; INTRAVENOUS PRN
Status: DISCONTINUED | OUTPATIENT
Start: 2020-01-22 | End: 2020-01-22 | Stop reason: SURG

## 2020-01-22 RX ORDER — OXYCODONE HYDROCHLORIDE AND ACETAMINOPHEN 5; 325 MG/1; MG/1
1 TABLET ORAL EVERY 6 HOURS PRN
Qty: 20 TAB | Refills: 0 | Status: SHIPPED | OUTPATIENT
Start: 2020-01-22 | End: 2020-01-27

## 2020-01-22 RX ORDER — HALOPERIDOL 5 MG/ML
1 INJECTION INTRAMUSCULAR
Status: DISCONTINUED | OUTPATIENT
Start: 2020-01-22 | End: 2020-01-22 | Stop reason: HOSPADM

## 2020-01-22 RX ORDER — HYDROMORPHONE HYDROCHLORIDE 1 MG/ML
0.2 INJECTION, SOLUTION INTRAMUSCULAR; INTRAVENOUS; SUBCUTANEOUS
Status: DISCONTINUED | OUTPATIENT
Start: 2020-01-22 | End: 2020-01-22 | Stop reason: HOSPADM

## 2020-01-22 RX ORDER — LIDOCAINE HYDROCHLORIDE 20 MG/ML
INJECTION, SOLUTION EPIDURAL; INFILTRATION; INTRACAUDAL; PERINEURAL PRN
Status: DISCONTINUED | OUTPATIENT
Start: 2020-01-22 | End: 2020-01-22 | Stop reason: SURG

## 2020-01-22 RX ORDER — BUPIVACAINE HYDROCHLORIDE AND EPINEPHRINE 2.5; 5 MG/ML; UG/ML
INJECTION, SOLUTION EPIDURAL; INFILTRATION; INTRACAUDAL; PERINEURAL
Status: DISCONTINUED | OUTPATIENT
Start: 2020-01-22 | End: 2020-01-22 | Stop reason: HOSPADM

## 2020-01-22 RX ORDER — OXYCODONE HCL 5 MG/5 ML
10 SOLUTION, ORAL ORAL
Status: COMPLETED | OUTPATIENT
Start: 2020-01-22 | End: 2020-01-22

## 2020-01-22 RX ORDER — BACITRACIN 50000 [IU]/1
INJECTION, POWDER, FOR SOLUTION INTRAMUSCULAR
Status: DISCONTINUED | OUTPATIENT
Start: 2020-01-22 | End: 2020-01-22 | Stop reason: HOSPADM

## 2020-01-22 RX ORDER — ONDANSETRON 2 MG/ML
INJECTION INTRAMUSCULAR; INTRAVENOUS PRN
Status: DISCONTINUED | OUTPATIENT
Start: 2020-01-22 | End: 2020-01-22 | Stop reason: SURG

## 2020-01-22 RX ORDER — DEXAMETHASONE SODIUM PHOSPHATE 4 MG/ML
INJECTION, SOLUTION INTRA-ARTICULAR; INTRALESIONAL; INTRAMUSCULAR; INTRAVENOUS; SOFT TISSUE PRN
Status: DISCONTINUED | OUTPATIENT
Start: 2020-01-22 | End: 2020-01-22 | Stop reason: SURG

## 2020-01-22 RX ORDER — ACETAMINOPHEN 500 MG
1000 TABLET ORAL ONCE
Status: COMPLETED | OUTPATIENT
Start: 2020-01-22 | End: 2020-01-22

## 2020-01-22 RX ORDER — MEPERIDINE HYDROCHLORIDE 25 MG/ML
12.5 INJECTION INTRAMUSCULAR; INTRAVENOUS; SUBCUTANEOUS
Status: DISCONTINUED | OUTPATIENT
Start: 2020-01-22 | End: 2020-01-22 | Stop reason: HOSPADM

## 2020-01-22 RX ORDER — ROCURONIUM BROMIDE 10 MG/ML
INJECTION, SOLUTION INTRAVENOUS PRN
Status: DISCONTINUED | OUTPATIENT
Start: 2020-01-22 | End: 2020-01-22 | Stop reason: SURG

## 2020-01-22 RX ORDER — HYDROMORPHONE HYDROCHLORIDE 1 MG/ML
0.4 INJECTION, SOLUTION INTRAMUSCULAR; INTRAVENOUS; SUBCUTANEOUS
Status: DISCONTINUED | OUTPATIENT
Start: 2020-01-22 | End: 2020-01-22 | Stop reason: HOSPADM

## 2020-01-22 RX ORDER — ONDANSETRON 2 MG/ML
4 INJECTION INTRAMUSCULAR; INTRAVENOUS
Status: DISCONTINUED | OUTPATIENT
Start: 2020-01-22 | End: 2020-01-22 | Stop reason: HOSPADM

## 2020-01-22 RX ORDER — DIPHENHYDRAMINE HYDROCHLORIDE 50 MG/ML
6.25 INJECTION INTRAMUSCULAR; INTRAVENOUS
Status: DISCONTINUED | OUTPATIENT
Start: 2020-01-22 | End: 2020-01-22 | Stop reason: HOSPADM

## 2020-01-22 RX ORDER — LIDOCAINE HYDROCHLORIDE 10 MG/ML
INJECTION, SOLUTION EPIDURAL; INFILTRATION; INTRACAUDAL; PERINEURAL
Status: COMPLETED
Start: 2020-01-22 | End: 2020-01-22

## 2020-01-22 RX ORDER — HYDRALAZINE HYDROCHLORIDE 20 MG/ML
5 INJECTION INTRAMUSCULAR; INTRAVENOUS
Status: DISCONTINUED | OUTPATIENT
Start: 2020-01-22 | End: 2020-01-22 | Stop reason: HOSPADM

## 2020-01-22 RX ORDER — LIDOCAINE HYDROCHLORIDE 40 MG/ML
SOLUTION TOPICAL PRN
Status: DISCONTINUED | OUTPATIENT
Start: 2020-01-22 | End: 2020-01-22 | Stop reason: SURG

## 2020-01-22 RX ORDER — HYDROMORPHONE HYDROCHLORIDE 1 MG/ML
0.1 INJECTION, SOLUTION INTRAMUSCULAR; INTRAVENOUS; SUBCUTANEOUS
Status: DISCONTINUED | OUTPATIENT
Start: 2020-01-22 | End: 2020-01-22 | Stop reason: HOSPADM

## 2020-01-22 RX ADMIN — LIDOCAINE HYDROCHLORIDE 60 MG: 20 INJECTION, SOLUTION EPIDURAL; INFILTRATION; INTRACAUDAL at 08:50

## 2020-01-22 RX ADMIN — GABAPENTIN 300 MG: 300 CAPSULE ORAL at 07:34

## 2020-01-22 RX ADMIN — ONDANSETRON 4 MG: 2 INJECTION INTRAMUSCULAR; INTRAVENOUS at 09:21

## 2020-01-22 RX ADMIN — OXYCODONE HYDROCHLORIDE 5 MG: 5 SOLUTION ORAL at 09:55

## 2020-01-22 RX ADMIN — PROPOFOL 200 MG: 10 INJECTION, EMULSION INTRAVENOUS at 08:50

## 2020-01-22 RX ADMIN — Medication 0.5 ML: at 07:34

## 2020-01-22 RX ADMIN — LIDOCAINE HYDROCHLORIDE 4 ML: 40 SOLUTION TOPICAL at 08:51

## 2020-01-22 RX ADMIN — CEFAZOLIN 2 G: 330 INJECTION, POWDER, FOR SOLUTION INTRAMUSCULAR; INTRAVENOUS at 08:50

## 2020-01-22 RX ADMIN — DEXAMETHASONE SODIUM PHOSPHATE 8 MG: 4 INJECTION, SOLUTION INTRA-ARTICULAR; INTRALESIONAL; INTRAMUSCULAR; INTRAVENOUS; SOFT TISSUE at 08:54

## 2020-01-22 RX ADMIN — SUGAMMADEX 200 MG: 100 INJECTION, SOLUTION INTRAVENOUS at 09:24

## 2020-01-22 RX ADMIN — ACETAMINOPHEN 1000 MG: 500 TABLET, FILM COATED ORAL at 07:34

## 2020-01-22 RX ADMIN — CELECOXIB 200 MG: 200 CAPSULE ORAL at 07:34

## 2020-01-22 RX ADMIN — LIDOCAINE HYDROCHLORIDE 0.5 ML: 10 INJECTION, SOLUTION EPIDURAL; INFILTRATION; INTRACAUDAL at 07:34

## 2020-01-22 RX ADMIN — SODIUM CHLORIDE, POTASSIUM CHLORIDE, SODIUM LACTATE AND CALCIUM CHLORIDE: 600; 310; 30; 20 INJECTION, SOLUTION INTRAVENOUS at 07:34

## 2020-01-22 RX ADMIN — FENTANYL CITRATE 100 MCG: 50 INJECTION, SOLUTION INTRAMUSCULAR; INTRAVENOUS at 08:50

## 2020-01-22 RX ADMIN — ROCURONIUM BROMIDE 50 MG: 10 INJECTION, SOLUTION INTRAVENOUS at 08:50

## 2020-01-22 NOTE — ANESTHESIA TIME REPORT
Anesthesia Start and Stop Event Times     Date Time Event    1/22/2020 0840 Ready for Procedure     0847 Anesthesia Start     0937 Anesthesia Stop        Responsible Staff  01/22/20    Name Role Begin End    Eder Dial M.D. Anesth 0847 0937        Preop Diagnosis (Free Text):  Pre-op Diagnosis     BILATERAL INGUINAL HERNIA        Preop Diagnosis (Codes):    Post op Diagnosis  Bilateral inguinal hernia      Premium Reason  Non-Premium    Comments:

## 2020-01-22 NOTE — ANESTHESIA PREPROCEDURE EVALUATION
Past Medical History:   Diagnosis Date   • Arthritis     arms and legs   • Heart burn     Gerd   • High cholesterol    • Hyperlipidemia    • Hypertension    • suspected TIA 2018    no deficits         Relevant Problems   NEURO   (+) H/O bee sting allergy      CARDIAC   (+) Essential hypertension   (+) Heart murmur       Physical Exam    Airway   Mallampati: II  TM distance: >3 FB  Neck ROM: full       Cardiovascular - normal exam  Rhythm: regular  Rate: normal  (-) murmur     Dental - normal exam         Pulmonary - normal exam  Breath sounds clear to auscultation     Abdominal    Neurological - normal exam                 Anesthesia Plan    ASA 2       Plan - general       Airway plan will be ETT        Induction: intravenous    Postoperative Plan: Postoperative administration of opioids is intended.    Pertinent diagnostic labs and testing reviewed    Informed Consent:    Anesthetic plan and risks discussed with patient.

## 2020-01-22 NOTE — OP REPORT
NAME:  Michael Pradhan  MRN:  [id number]  :  1940      DATE OF OPERATION: 2020    PREOPERATIVE DIAGNOSIS: right Inguinal Hernia; possible left inguinal hernia    POSTOPERATIVE DIAGNOSIS: right Inguinal Hernia and  left inguinal hernia    OPERATION PERFORMED: Laparoscopic repair of right Inguinal Hernia with Mesh and  left inguinal hernia with mesh    SURGEON: Niall Linares MD    ASSISTANT:  Dagmar Cox PA-C    ANESTHESIOLOGIST:  Anesthesiologist: Eder Dial M.D.    ANESTHESIA: General endotracheal anesthesia.      SPECIMEN: None    ESTIMATED BLOOD LOSS: < 50cc.     INDICATIONS: The patient is a 79 y.o. male with a diagnosis of right groin bulge with right inguinal hernia; he also has symptoms and possible hernia on the left. He is taken to the operating room today for laparoscopic repair of inguinal hernia with mesh.     PROCEDURE: Following informed consent, the patient was properly identified, taken to the operating room, and placed in the supine position where general endotracheal anesthesia was administered. Intravenous antibiotics were administered by the anesthesiologist in the correct time interval. Sequential compression devices were employed. The abdomen was prepped and draped into a sterile field.     A small infraumbilical skin incision was made and dissection was carried to the right of midline.  The anterior rectus abdominus fascia sheath was exposed and incised.  The preperitoneal plane was developed bluntly.  The [employer] dissecting trocar balloon instrument was then introduced into the preperitoneal space.  The hand pump was then used to create the preperitoneal dissection.    The balloon trocar was then removed and the CO2 insufflation and optical trocar was then advanced.  The CO2 insufflation was started and the laparoscope was introduced. This demonstrated a nice preperitoneal dissection.    Two additional 5mm trocars were then placed at midline just below the umbilicus.   Blunt dissectors were used to then dissect the right inguinal floor.  A moderately large large sized indirect inguinal hernia was present with the hernia sac extending up through the inguinal canal.  The peritoneum was slowly reduced and gradually .  The cord lipoma was also reduced.  The vessels and structures were carefully protected and a nice inguinal floor dissection was accomplished.    The right light weight contour mesh was soaked in Kantrex solution and was trimmed so that there was a generous fenestrated keyhole.  The mesh was then rolled and introduced into the preperitoneal space.  It was then maneuvered into position.  The mesh was then secured to the periosteum along Surya's ligament and the pubic ramus with the absorbable tacking instrument.  It had very nice position obliterating the hernia defect without constriction of any structures.            A careful inspection of the left inguinal floor was performed.  An indirect left inguinal hernia was present with a significant cord lipoma.  The hernia was reduced and the cord lipoma reduced with preservation of the vas and vessels.  The left light weight contour mesh was soaked in Kantrex solution and trimmed with a generous keyhole fenestrated.  The mesh was then rolled and introduced into the space, maneuvered into position and then secured with just a few absorbable tacks along the pubic ramus and muscle.  This had a very nice position, obliterating the hernia defect without any constriction of the cord structures.    The mesh was then held into place with the pneumoperitoneum allowed to escape.  The port were then removed under direct vision.  The port sites were then irrigated well.  The fascia was closed with O Vicryl suture.  The port site skin incisions were closed with interrupted 4-0 Vicryl subcuticular sutures.  Steri-Strips and Benzoin were applied beneath sterile Band-Aids.     The patient tolerated the procedure well and there  were no apparent complications. All sponge, needle, and instrument counts were correct on 2 separate occasions. He was awakened, extubated, and transferred to the recovery room in satisfactory condition.       ____________________________________   Niall Linares MD  DD: 1/22/2020  10:54 AM    CC:  [facility];

## 2020-01-22 NOTE — PROGRESS NOTES
1045- pt in phase 2 recovery, pt alert and orientedx 4, pt ambulated to restroom, voided without complication. Umbilical incision slight drainage, Band-Aid reapplied.   Pt reports pain tolerable.     1050- dc instructions reviewed with pt and wife, pt aware of s/s to report. To keep ice pack on for comfort.  Pt reports he would like to get dressed

## 2020-01-22 NOTE — ANESTHESIA PROCEDURE NOTES
Airway  Date/Time: 1/22/2020 8:51 AM  Performed by: Eder Dial M.D.  Authorized by: Eder Dial M.D.     Location:  OR  Urgency:  Elective  Difficult Airway: No    Indications for Airway Management:  Anesthesia  Spontaneous Ventilation: absent    Sedation Level:  Deep  Preoxygenated: Yes    Patient Position:  Sniffing  Mask Difficulty Assessment:  1 - vent by mask  Final Airway Type:  Endotracheal airway  Final Endotracheal Airway:  ETT  Cuffed: Yes    Technique Used for Successful ETT Placement:  Direct laryngoscopy  Insertion Site:  Oral  Blade Type:  Jimmy  Laryngoscope Blade/Videolaryngoscope Blade Size:  3  ETT Size (mm):  7.5  Measured from:  Teeth  ETT to Teeth (cm):  22  Placement Verified by: auscultation and capnometry    Cormack-Lehane Classification:  Grade IIa - partial view of glottis  Number of Attempts at Approach:  1

## 2020-01-22 NOTE — OR NURSING
Patient AAOx4, calm. C/o slight lower abdominal discomfort, oral pain medication given, patient resting comfortably. VSS, afebrile, room air 94%. Lower abdominal dressings x3 CDI, ice applied. Tolerating sips of water and juice without nausea. Wife notified of patient status and plan of care.

## 2020-01-22 NOTE — ANESTHESIA POSTPROCEDURE EVALUATION
Patient: Michael Pradhan    Procedure Summary     Date:  01/22/20 Room / Location:  [place] [address] / SURGERY [facility]    Anesthesia Start:  0847 Anesthesia Stop:  0937    Procedure:  REPAIR, HERNIA, INGUINAL, LAPAROSCOPIC- WITH MESH (Bilateral Groin) Diagnosis:  (BILATERAL INGUINAL HERNIA)    Surgeon:  Niall Linares M.D. Responsible Provider:  Eder Dial M.D.    Anesthesia Type:  general ASA Status:  2          Final Anesthesia Type: general  Last vitals  BP   Blood Pressure : 125/79    Temp   36.1 °C (96.9 °F)    Pulse   Pulse: 61   Resp   13    SpO2   96 %      Anesthesia Post Evaluation    Patient location during evaluation: PACU  Patient participation: complete - patient participated  Level of consciousness: awake and alert    Airway patency: patent  Anesthetic complications: no  Cardiovascular status: hemodynamically stable  Respiratory status: acceptable  Hydration status: euvolemic    PONV: none           Nurse Pain Score: 3 (NPRS)

## 2020-01-23 NOTE — DISCHARGE INSTRUCTIONS
ACTIVITY: Rest and take it easy for the first 24 hours.  A responsible adult is recommended to remain with you during that time.  It is normal to feel sleepy.  We encourage you to not do anything that requires balance, judgment or coordination.    MILD FLU-LIKE SYMPTOMS ARE NORMAL. YOU MAY EXPERIENCE GENERALIZED MUSCLE ACHES, THROAT IRRITATION, HEADACHE AND/OR SOME NAUSEA.    FOR 24 HOURS DO NOT:  Drive, operate machinery or run household appliances.  Drink beer or alcoholic beverages.   Make important decisions or sign legal documents.    SPECIAL INSTRUCTIONS:   1. DIET: Upon discharge from the hospital you may resume your normal preoperative diet. Depending on how you are feeling and whether you have nausea or not, you may wish to stay with a bland diet for the first few days. However, you can advance this as quickly as you feel ready.     2. ACTIVITIES: After discharge from the hospital, you may resume full routine activities. However, there should be no heavy lifting (greater than 15 pounds) and no strenuous activities until after your follow-up visit. Otherwise, routine activities of daily living are acceptable.     3. DRIVING: You may drive whenever you are off pain medications and are able to perform the activities needed to drive, i.e. turning, bending, twisting, etc.     4. BATHING: You may get the wound wet at any time after leaving the hospital. You may shower, but do not submerge in a bath for at least a week. Dressings may come off after 48 hours.     5. BOWEL FUNCTION: Constipation is common after an operation, especially with pain medications. The combination of pain medication and decreased activity level can cause constipation in otherwise normal patients. If you feel this is occurring, take a laxative (Miralax, Milk of Magnesia, Ex-Lax, Senokot, etc.) until the problem has resolved.     6. PAIN MEDICATION: You will be given a prescription for pain medication at discharge. Please take these as  directed. It is important to remember not to take medications on an empty stomach as this may cause nausea.     7.CALL IF YOU HAVE: (1) Fevers to more than 101.0 F, (2) Unusual chest or leg pain, (3) Drainage or fluid from incision that may be foul smelling, increased tenderness or soreness at the wound or the wound edges are no longer together, redness or swelling at the incision site. Please do not hesitate to call with any other questions.     8. APPOINTMENT: Contact our office at [phone] for a follow-up appointment in 1-2 weeks following your procedure.     If you have any additional questions, please do not hesitate to call the office and speak to either myself or the physician on call.     Office address:   [facility].   [address] [place] [address] [address]   [place] [address]    DIET: To avoid nausea, slowly advance diet as tolerated, avoiding spicy or greasy foods for the first day.  Add more substantial food to your diet according to your physician's instructions.  Babies can be fed formula or breast milk as soon as they are hungry.  INCREASE FLUIDS AND FIBER TO AVOID CONSTIPATION.    SURGICAL DRESSING/BATHING: see above    FOLLOW-UP APPOINTMENT:  A follow-up appointment should be arranged with your doctor in 1-2 weeks; call to schedule.    You should CALL YOUR PHYSICIAN if you develop:  Fever greater than 101 degrees F.  Pain not relieved by medication, or persistent nausea or vomiting.  Excessive bleeding (blood soaking through dressing) or unexpected drainage from the wound.  Extreme redness or swelling around the incision site, drainage of pus or foul smelling drainage.  Inability to urinate or empty your bladder within 8 hours.  Problems with breathing or chest pain.    You should call 911 if you develop problems with breathing or chest pain.  If you are unable to contact your doctor or surgical center, you should go to the nearest emergency room or urgent care center.  Physician's  telephone #: [phone]    If any questions arise, call your doctor.  If your doctor is not available, please feel free to call the Surgical Center at [phone].  The Center is open Monday through Friday from 7AM to 7PM.  You can also call the HEALTH HOTLINE open 24 hours/day, 7 days/week and speak to a nurse at [phone], or toll free at [phone].    A registered nurse may call you a few days after your surgery to see how you are doing after your procedure.    MEDICATIONS: Resume taking daily medication.  Take prescribed pain medication with food.  If no medication is prescribed, you may take non-aspirin pain medication if needed.  PAIN MEDICATION CAN BE VERY CONSTIPATING.  Take a stool softener or laxative such as senokot, pericolace, or milk of magnesia if needed.    Prescription given for percocet.  Last pain medication given at 0955.    If your physician has prescribed pain medication that includes Acetaminophen (Tylenol), do not take additional Acetaminophen (Tylenol) while taking the prescribed medication.    Depression / Suicide Risk    As you are discharged from this [facility] facility, it is important to learn how to keep safe from harming yourself.    Recognize the warning signs:  · Abrupt changes in personality, positive or negative- including increase in energy   · Giving away possessions  · Change in eating patterns- significant weight changes-  positive or negative  · Change in sleeping patterns- unable to sleep or sleeping all the time   · Unwillingness or inability to communicate  · Depression  · Unusual sadness, discouragement and loneliness  · Talk of wanting to die  · Neglect of personal appearance   · Rebelliousness- reckless behavior  · Withdrawal from people/activities they love  · Confusion- inability to concentrate     If you or a loved one observes any of these behaviors or has concerns about self-harm, here's what you can do:  · Talk about it- your feelings and reasons for  harming yourself  · Remove any means that you might use to hurt yourself (examples: pills, rope, extension cords, firearm)  · Get professional help from the community (Mental Health, Substance Abuse, psychological counseling)  · Do not be alone:Call your Safe Contact- someone whom you trust who will be there for you.  · Call your local CRISIS HOTLINE 558-5334 or 160-778-1123  · Call your local Children's Mobile Crisis Response Team [place] (191) 896-2234 or www.A2B  · Call the toll free National Suicide Prevention Hotlines   · National Suicide Prevention Lifeline 531-831-OWEX (2784)  · National Hope Line Network 800-SUICIDE (629-5099)

## 2020-05-14 NOTE — PROGRESS NOTES
Outcome: Pt declined to schedule an appt at the office or virtual, to complete AWV. Pt stated that he will call to schedule it later on. Pt confirmed that has no RESP. symptoms at this time.

## 2020-12-23 ENCOUNTER — OFFICE VISIT (OUTPATIENT)
Dept: MEDICAL GROUP | Facility: MEDICAL CENTER | Age: 80
End: 2020-12-23
Payer: MEDICARE

## 2020-12-23 VITALS
HEART RATE: 53 BPM | SYSTOLIC BLOOD PRESSURE: 128 MMHG | BODY MASS INDEX: 31.1 KG/M2 | TEMPERATURE: 98.6 F | DIASTOLIC BLOOD PRESSURE: 76 MMHG | OXYGEN SATURATION: 98 % | HEIGHT: 69 IN | WEIGHT: 210 LBS

## 2020-12-23 DIAGNOSIS — E55.9 VITAMIN D DEFICIENCY: ICD-10-CM

## 2020-12-23 DIAGNOSIS — E78.5 DYSLIPIDEMIA: ICD-10-CM

## 2020-12-23 DIAGNOSIS — K40.21 BILATERAL RECURRENT INGUINAL HERNIA WITHOUT OBSTRUCTION OR GANGRENE: ICD-10-CM

## 2020-12-23 DIAGNOSIS — R73.01 IMPAIRED FASTING GLUCOSE: ICD-10-CM

## 2020-12-23 DIAGNOSIS — I10 ESSENTIAL HYPERTENSION: ICD-10-CM

## 2020-12-23 PROCEDURE — 99213 OFFICE O/P EST LOW 20 MIN: CPT | Performed by: NURSE PRACTITIONER

## 2020-12-23 RX ORDER — ATORVASTATIN CALCIUM 10 MG/1
TABLET, FILM COATED ORAL
Qty: 90 TAB | Refills: 3 | Status: SHIPPED | OUTPATIENT
Start: 2020-12-23 | End: 2020-12-24 | Stop reason: SDUPTHER

## 2020-12-23 ASSESSMENT — PATIENT HEALTH QUESTIONNAIRE - PHQ9: CLINICAL INTERPRETATION OF PHQ2 SCORE: 0

## 2020-12-23 NOTE — PROGRESS NOTES
Subjective:      Michael Pradhan is a 80 y.o. male who presents with Paperwork        CC: Patient is here today for follow-up on dyslipidemia, hypertension and vitamin D deficiency.    HPI       1. Dyslipidemia  I have not seen patient in over 1-1/2 years and subsequently he has ran out of his cholesterol medicine and his last blood work for me was done in 2019 and showed good cholesterol at that time on medication.    2. Essential hypertension  Patient was on lisinopril 10 mg but has been out of the medicine for a few months and states his blood pressure has been running good.  Today in the office he is normotensive without medicine.  He does bring with him paperwork for DMV.    3. Vitamin D deficiency disease  Patient on over-the-counter vitamin D because of history of vitamin D deficiency    4. Impaired fasting glucose  Last A1c was good at 5.3 with previous history of prediabetes.    5. Bilateral recurrent inguinal hernia without obstruction or gangrene  Patient seen by the surgeon in January of this year and had a successful inguinal hernia repair.  Past Medical History:   Diagnosis Date   • Arthritis     arms and legs   • Heart burn     Gerd   • High cholesterol    • Hyperlipidemia    • Hypertension    • suspected TIA 2018    no deficits     Social History     Socioeconomic History   • Marital status:      Spouse name: Not on file   • Number of children: Not on file   • Years of education: Not on file   • Highest education level: Not on file   Occupational History   • Not on file   Social Needs   • Financial resource strain: Not on file   • Food insecurity     Worry: Not on file     Inability: Not on file   • Transportation needs     Medical: Not on file     Non-medical: Not on file   Tobacco Use   • Smoking status: Former Smoker     Packs/day: 1.00     Years: 5.00     Pack years: 5.00     Types: Cigarettes     Quit date: 1969     Years since quittin.9   • Smokeless tobacco: Never  "Used   • Tobacco comment: Started smoking at age [age] quit started again at [age]   Substance and Sexual Activity   • Alcohol use: Yes     Alcohol/week: 0.0 - 1.2 oz   • Drug use: No   • Sexual activity: Yes     Partners: Female   Lifestyle   • Physical activity     Days per week: Not on file     Minutes per session: Not on file   • Stress: Not on file   Relationships   • Social connections     Talks on phone: Not on file     Gets together: Not on file     Attends [employer]ism service: Not on file     Active member of club or organization: Not on file     Attends meetings of clubs or organizations: Not on file     Relationship status: Not on file   • Intimate partner violence     Fear of current or ex partner: Not on file     Emotionally abused: Not on file     Physically abused: Not on file     Forced sexual activity: Not on file   Other Topics Concern   • Not on file   Social History Narrative   • Not on file     Current Outpatient Medications   Medication Sig Dispense Refill   • atorvastatin (LIPITOR) 10 MG Tab TAKE 1 TABLET DAILY (NEED OFFICE VISIT BEFORE MEDICATIONS CAN BE REFILLED AGAIN) 90 Tab 3   • LUMIGAN 0.01 % Solution Place 1 Drop in both eyes every bedtime.     • vitamin D (CHOLECALCIFEROL) 1000 UNIT Tab Take 1,000 Units by mouth every day.       No current facility-administered medications for this visit.      Family History   Problem Relation Age of Onset   • Diabetes Mother    • Diabetes Sister    • Other Brother         Spinal Miningitis   • Schizophrenia Brother    • Diabetes Sister    • Dementia Maternal Grandmother    • Dementia Paternal Grandmother    • Colon Cancer Paternal Grandfather    • Cancer Brother         Esophigial         Review of Systems   All other systems reviewed and are negative.         Objective:     /76 (BP Location: Right arm, Patient Position: Sitting, BP Cuff Size: Adult)   Pulse (!) 53   Temp 37 °C (98.6 °F) (Temporal)   Ht 1.753 m (5' 9\")   Wt 95.3 kg (210 lb)   " SpO2 98%   BMI 31.01 kg/m²      Physical Exam  Vitals signs and nursing note reviewed.   Constitutional:       General: He is not in acute distress.     Appearance: He is well-developed. He is not diaphoretic.   HENT:      Head: Normocephalic and atraumatic.      Right Ear: External ear normal.      Left Ear: External ear normal.      Nose: Nose normal.   Eyes:      General:         Right eye: No discharge.         Left eye: No discharge.      Conjunctiva/sclera: Conjunctivae normal.   Neck:      Musculoskeletal: Normal range of motion and neck supple.      Thyroid: No thyromegaly.      Trachea: No tracheal deviation.   Cardiovascular:      Rate and Rhythm: Normal rate and regular rhythm.      Heart sounds: Murmur present.   Pulmonary:      Effort: Pulmonary effort is normal. No respiratory distress.      Breath sounds: Normal breath sounds. No wheezing or rales.   Lymphadenopathy:      Cervical: No cervical adenopathy.   Skin:     General: Skin is warm and dry.      Findings: No erythema or rash.   Neurological:      Mental Status: He is alert and oriented to person, place, and time.      Coordination: Coordination normal.   Psychiatric:         Behavior: Behavior normal.         Thought Content: Thought content normal.         Judgment: Judgment normal.                 Assessment/Plan:        1. Dyslipidemia  I will get patient back on his atorvastatin 10 mg and have him do lab work.  I did remind him he needs to be seen at least yearly for exam and lab work.  - Lipid Profile; Future    2. Essential hypertension  Patient ran out of his blood pressure medication but his blood pressure is been doing well outside the office according to patient and is normotensive in the office so for now we will keep him off antihypertensives.  - Comp Metabolic Panel; Future  - VITAMIN B12; Future    3. Vitamin D deficiency disease  Patient will continue with over-the-counter vitamin D    4. Impaired fasting glucose  Most recent  A1c was normal so I will remove this diagnosis unless his blood sugars come back elevated at his upcoming testing.    5. Bilateral recurrent inguinal hernia without obstruction or gangrene  Patient states this has been corrected surgically and he is having no problems and his surgical notes were reviewed.    DMV paperwork filled out for patient today and cosign by physician.  He will be going to his optometrist for the eye portion of this.

## 2020-12-24 RX ORDER — ATORVASTATIN CALCIUM 10 MG/1
TABLET, FILM COATED ORAL
Qty: 100 TAB | Refills: 3 | Status: SHIPPED | OUTPATIENT
Start: 2020-12-24 | End: 2021-05-27 | Stop reason: RX

## 2020-12-24 NOTE — TELEPHONE ENCOUNTER
Received request via: Pharmacy    Was the patient seen in the last year in this department? Yes    Does the patient have an active prescription (recently filled or refills available) for medication(s) requested? Insurance requesting 100 day supply     Requested Prescriptions     Pending Prescriptions Disp Refills   • atorvastatin (LIPITOR) 10 MG Tab 100 Tab 3     Sig: TAKE 1 TABLET DAILY (NEED OFFICE VISIT BEFORE MEDICATIONS CAN BE REFILLED AGAIN)

## 2021-01-11 DIAGNOSIS — Z23 NEED FOR VACCINATION: ICD-10-CM

## 2021-01-12 DIAGNOSIS — Z91.030 H/O BEE STING ALLERGY: ICD-10-CM

## 2021-01-13 ENCOUNTER — HOSPITAL ENCOUNTER (OUTPATIENT)
Dept: LAB | Facility: MEDICAL CENTER | Age: 81
End: 2021-01-13
Attending: NURSE PRACTITIONER
Payer: MEDICARE

## 2021-01-13 DIAGNOSIS — E78.5 DYSLIPIDEMIA: ICD-10-CM

## 2021-01-13 DIAGNOSIS — I10 ESSENTIAL HYPERTENSION: ICD-10-CM

## 2021-01-13 LAB
ALBUMIN SERPL BCP-MCNC: 4.3 G/DL (ref 3.2–4.9)
ALBUMIN/GLOB SERPL: 1.5 G/DL
ALP SERPL-CCNC: 56 U/L (ref 30–99)
ALT SERPL-CCNC: 22 U/L (ref 2–50)
ANION GAP SERPL CALC-SCNC: 6 MMOL/L (ref 7–16)
AST SERPL-CCNC: 24 U/L (ref 12–45)
BILIRUB SERPL-MCNC: 1 MG/DL (ref 0.1–1.5)
BUN SERPL-MCNC: 16 MG/DL (ref 8–22)
CALCIUM SERPL-MCNC: 9.4 MG/DL (ref 8.5–10.5)
CHLORIDE SERPL-SCNC: 105 MMOL/L (ref 96–112)
CHOLEST SERPL-MCNC: 173 MG/DL (ref 100–199)
CO2 SERPL-SCNC: 26 MMOL/L (ref 20–33)
CREAT SERPL-MCNC: 0.78 MG/DL (ref 0.5–1.4)
FASTING STATUS PATIENT QL REPORTED: NORMAL
GLOBULIN SER CALC-MCNC: 2.8 G/DL (ref 1.9–3.5)
GLUCOSE SERPL-MCNC: 99 MG/DL (ref 65–99)
HDLC SERPL-MCNC: 50 MG/DL
LDLC SERPL CALC-MCNC: 110 MG/DL
POTASSIUM SERPL-SCNC: 3.6 MMOL/L (ref 3.6–5.5)
PROT SERPL-MCNC: 7.1 G/DL (ref 6–8.2)
SODIUM SERPL-SCNC: 137 MMOL/L (ref 135–145)
TRIGL SERPL-MCNC: 66 MG/DL (ref 0–149)
VIT B12 SERPL-MCNC: 593 PG/ML (ref 211–911)

## 2021-01-13 PROCEDURE — 80061 LIPID PANEL: CPT

## 2021-01-13 PROCEDURE — 80053 COMPREHEN METABOLIC PANEL: CPT

## 2021-01-13 PROCEDURE — 36415 COLL VENOUS BLD VENIPUNCTURE: CPT

## 2021-01-13 PROCEDURE — 82607 VITAMIN B-12: CPT

## 2021-01-15 ENCOUNTER — IMMUNIZATION (OUTPATIENT)
Dept: FAMILY PLANNING/WOMEN'S HEALTH CLINIC | Facility: IMMUNIZATION CENTER | Age: 81
End: 2021-01-15
Attending: INTERNAL MEDICINE
Payer: MEDICARE

## 2021-01-15 DIAGNOSIS — Z23 ENCOUNTER FOR VACCINATION: Primary | ICD-10-CM

## 2021-01-15 DIAGNOSIS — Z23 NEED FOR VACCINATION: ICD-10-CM

## 2021-01-15 PROCEDURE — 91300 PFIZER SARS-COV-2 VACCINE: CPT

## 2021-01-15 PROCEDURE — 0001A PFIZER SARS-COV-2 VACCINE: CPT

## 2021-02-05 ENCOUNTER — APPOINTMENT (OUTPATIENT)
Dept: FAMILY PLANNING/WOMEN'S HEALTH CLINIC | Facility: IMMUNIZATION CENTER | Age: 81
End: 2021-02-05
Attending: INTERNAL MEDICINE
Payer: MEDICARE

## 2021-02-05 PROCEDURE — 91300 PFIZER SARS-COV-2 VACCINE: CPT

## 2021-02-05 PROCEDURE — 0002A PFIZER SARS-COV-2 VACCINE: CPT

## 2021-02-08 ENCOUNTER — IMMUNIZATION (OUTPATIENT)
Dept: FAMILY PLANNING/WOMEN'S HEALTH CLINIC | Facility: IMMUNIZATION CENTER | Age: 81
End: 2021-02-08
Payer: MEDICARE

## 2021-02-08 DIAGNOSIS — Z23 ENCOUNTER FOR VACCINATION: Primary | ICD-10-CM

## 2021-05-05 ENCOUNTER — PATIENT MESSAGE (OUTPATIENT)
Dept: HEALTH INFORMATION MANAGEMENT | Facility: OTHER | Age: 81
End: 2021-05-05

## 2021-05-18 ENCOUNTER — PATIENT MESSAGE (OUTPATIENT)
Dept: HEALTH INFORMATION MANAGEMENT | Facility: OTHER | Age: 81
End: 2021-05-18

## 2021-05-18 ENCOUNTER — PATIENT OUTREACH (OUTPATIENT)
Dept: HEALTH INFORMATION MANAGEMENT | Facility: OTHER | Age: 81
End: 2021-05-18

## 2021-05-18 NOTE — PROGRESS NOTES
Attempt #: Final  [facility] Verified: yes  Verify PCP: yes  Comprehensive Geriatric Assessment  1. Scheduling Status:Scheduled        Care Gap Scheduling (Attempt to Schedule EACH Overdue Care Gap!)  Health Maintenance Due   Topic Date Due   • Annual Wellness Visit  05/16/2020     MyChart Activation: already active

## 2021-05-27 PROBLEM — I10 ESSENTIAL HYPERTENSION: Status: ACTIVE | Noted: 2021-05-27

## 2021-05-27 PROBLEM — R94.128: Status: ACTIVE | Noted: 2021-05-27

## 2021-05-27 PROBLEM — Z86.73 HISTORY OF TIA (TRANSIENT ISCHEMIC ATTACK): Status: ACTIVE | Noted: 2021-05-27

## 2021-05-27 PROBLEM — R94.138: Status: ACTIVE | Noted: 2021-05-27

## 2021-05-27 PROBLEM — M54.50 LOW BACK PAIN: Status: ACTIVE | Noted: 2021-05-27

## 2021-05-27 PROBLEM — I77.9 DISORDER OF ARTERIES AND ARTERIOLES, UNSPECIFIED (HCC): Status: RESOLVED | Noted: 2021-05-27 | Resolved: 2021-05-27

## 2021-05-27 PROBLEM — E55.9 VITAMIN D DEFICIENCY: Status: RESOLVED | Noted: 2017-02-17 | Resolved: 2021-05-27

## 2021-05-27 PROBLEM — I77.9 DISORDER OF ARTERIES AND ARTERIOLES, UNSPECIFIED (HCC): Status: ACTIVE | Noted: 2021-05-27

## 2021-05-27 PROBLEM — E55.9 VITAMIN D DEFICIENCY: Status: ACTIVE | Noted: 2021-05-27

## 2021-05-27 PROBLEM — E66.9 OBESITY (BMI 30-39.9): Status: RESOLVED | Noted: 2017-02-17 | Resolved: 2021-05-27

## 2021-05-27 PROBLEM — I77.9 DISORDER OF ARTERIES AND ARTERIOLES (HCC): Status: ACTIVE | Noted: 2021-05-27

## 2021-06-29 DIAGNOSIS — Z00.6 RESEARCH STUDY PATIENT: ICD-10-CM

## 2021-06-30 ENCOUNTER — HOSPITAL ENCOUNTER (OUTPATIENT)
Facility: MEDICAL CENTER | Age: 81
End: 2021-06-30
Attending: PATHOLOGY
Payer: COMMERCIAL

## 2021-06-30 DIAGNOSIS — Z00.6 RESEARCH STUDY PATIENT: ICD-10-CM

## 2021-07-08 LAB
ELF SCORE: 10.2
RELATIVE RISK: ABNORMAL
RISK GROUP: ABNORMAL
RISK: 23.6 %

## 2021-07-26 ENCOUNTER — OFFICE VISIT (OUTPATIENT)
Dept: MEDICAL GROUP | Facility: MEDICAL CENTER | Age: 81
End: 2021-07-26
Payer: MEDICARE

## 2021-07-26 VITALS
RESPIRATION RATE: 16 BRPM | BODY MASS INDEX: 28.59 KG/M2 | DIASTOLIC BLOOD PRESSURE: 80 MMHG | HEART RATE: 55 BPM | WEIGHT: 193.6 LBS | OXYGEN SATURATION: 98 % | SYSTOLIC BLOOD PRESSURE: 138 MMHG | TEMPERATURE: 96.7 F

## 2021-07-26 DIAGNOSIS — E78.5 DYSLIPIDEMIA: ICD-10-CM

## 2021-07-26 DIAGNOSIS — E55.9 VITAMIN D DEFICIENCY: ICD-10-CM

## 2021-07-26 DIAGNOSIS — I10 ESSENTIAL HYPERTENSION: ICD-10-CM

## 2021-07-26 PROCEDURE — 99213 OFFICE O/P EST LOW 20 MIN: CPT | Performed by: STUDENT IN AN ORGANIZED HEALTH CARE EDUCATION/TRAINING PROGRAM

## 2021-07-26 RX ORDER — ATORVASTATIN CALCIUM 10 MG/1
TABLET, FILM COATED ORAL
Qty: 100 TABLET | Refills: 3 | Status: SHIPPED | OUTPATIENT
Start: 2021-07-26 | End: 2022-02-24 | Stop reason: SDUPTHER

## 2021-07-26 ASSESSMENT — PATIENT HEALTH QUESTIONNAIRE - PHQ9: CLINICAL INTERPRETATION OF PHQ2 SCORE: 0

## 2021-07-26 NOTE — ASSESSMENT & PLAN NOTE
Patient's blood pressure remains well controlled.  Patient previously on lisinopril but stopped taking due to difficulty with pharmacy.  Patient states that he continues to take blood pressure at home and it remains at 120s-130s/80s-70s.  Blood pressure at 138/80 today.  Encourage patient continue taking blood pressure at home and if it remains elevated though we will restart medication.

## 2021-07-26 NOTE — ASSESSMENT & PLAN NOTE
BMI remains elevated.  Patient states that he has been walking 3 miles every morning and does resistance training every other day.

## 2021-07-26 NOTE — ASSESSMENT & PLAN NOTE
Patient previously taking atorvastatin 10 mg.  Patient stopped this medication due to difficulty with pharmacy.  Medication reordered today and patient encouraged to continue taking this to decrease cholesterol.

## 2021-07-26 NOTE — PROGRESS NOTES
Subjective:     CC: Dyslipidemia, hypertension follow-up    HPI:   Michael presents today with   Patient states that he scheduled the appointment for skilled nursing paperwork for both him and his wife but his wife is currently in the hospital on a ventilator.      Dyslipidemia  Patient previously taking atorvastatin 10 mg.  Patient stopped this medication due to difficulty with pharmacy.  Medication reordered today and patient encouraged to continue taking this to decrease cholesterol.    Vitamin D deficiency  Patient continues on vitamin D supplement.    BMI 29.0-29.9,adult  BMI remains elevated.  Patient states that he has been walking 3 miles every morning and does resistance training every other day.    Essential hypertension  Patient's blood pressure remains well controlled.  Patient previously on lisinopril but stopped taking due to difficulty with pharmacy.  Patient states that he continues to take blood pressure at home and it remains at 120s-130s/80s-70s.  Blood pressure at 138/80 today.  Encourage patient continue taking blood pressure at home and if it remains elevated though we will restart medication.      Current Outpatient Medications Ordered in Epic   Medication Sig Dispense Refill   • atorvastatin (LIPITOR) 10 MG Tab TAKE 1 TABLET DAILY 100 tablet 3   • EPINEPHrine 0.3 MG/0.3ML Solution Prefilled Syringe Inject 1 Each as directed one time as needed for up to 1 dose. 1 Each 11   • LUMIGAN 0.01 % Solution Place 1 Drop in both eyes every bedtime.     • vitamin D (CHOLECALCIFEROL) 1000 UNIT Tab Take 1,000 Units by mouth every day.       No current [facility]-ordered facility-administered medications on file.       Health Maintenance: Completed    ROS:  Gen: no fevers/chills  Pulm: no sob, no cough  CV: no chest pain  GI: no nausea/vomiting, no diarrhea  : no dysuria  MSk: no myalgias  Skin: no rash  Neuro: no headaches    Objective:     Exam:  /80   Pulse (!) 55   Temp 35.9 °C (96.7 °F)   Resp 16    Wt 87.8 kg (193 lb 9.6 oz)   SpO2 98%   BMI 28.59 kg/m²  Body mass index is 28.59 kg/m².    Gen: Alert and oriented, No apparent distress.  Neck: Neck is supple without lymphadenopathy.  Lungs: Normal effort, CTA bilaterally, no wheezes, rhonchi, or rales  CV: Regular rate and rhythm. No murmurs, rubs, or gallops.  Ext: No clubbing, cyanosis, edema.      Assessment & Plan:     81 y.o. male with the following -     1. Dyslipidemia  Chronic, stable.  Patient restarted on atorvastatin 10 mg.  Patient encouraged to take this medication to decrease cholesterol.    2. Vitamin D deficiency  Chronic, stable.  Patient continues on vitamin D supplement.    3. BMI 29.0-29.9,adult  Chronic, stable.  Patient continues to exercise frequently.  Discussed diet and exercise with patient.    4. Essential hypertension  Chronic, stable.  Patient blood pressure elevated at 138/80 today.  Patient states that he does take blood pressure at home and it remains well controlled.  Patient encouraged to keep a log of blood pressure and report back if he has elevated readings.    Return in about 6 months (around 1/26/2022).    Please note that this dictation was created using voice recognition software. I have made every reasonable attempt to correct obvious errors, but I expect that there are errors of grammar and possibly content that I did not discover before finalizing the note.

## 2021-09-21 ENCOUNTER — TELEPHONE (OUTPATIENT)
Dept: MEDICAL GROUP | Facility: MEDICAL CENTER | Age: 81
End: 2021-09-21

## 2021-09-21 NOTE — TELEPHONE ENCOUNTER
ESTABLISHED PATIENT PRE-VISIT PLANNING     Patient was NOT contacted to complete PVP.     Note: Patient will not be contacted if there is no indication to call.     1.  Reviewed notes from the last few office visits within the medical group: Yes    2.  If any orders were placed at last visit or intended to be done for this visit (i.e. 6 mos follow-up), do we have Results/Consult Notes?         •  Labs - Labs were not ordered at last office visit.  Note: If patient appointment is for lab review and patient did not complete labs, check with provider if OK to reschedule patient until labs completed.       •  Imaging - Imaging was not ordered at last office visit.       •  Referrals - No referrals were ordered at last office visit.    3. Is this appointment scheduled as a Hospital Follow-Up? No    4.  Immunizations were updated in Epic using Reconcile Outside Information activity? Yes    5.  Patient is due for the following Health Maintenance Topics:   Health Maintenance Due   Topic Date Due   • IMM INFLUENZA (1) 09/01/2021       6.  AHA (Pulse8) form printed for Provider? Yes

## 2021-09-23 ENCOUNTER — NON-PROVIDER VISIT (OUTPATIENT)
Dept: OCCUPATIONAL MEDICINE | Facility: CLINIC | Age: 81
End: 2021-09-23

## 2021-09-23 DIAGNOSIS — Z11.1 ENCOUNTER FOR PPD TEST: Primary | ICD-10-CM

## 2021-09-23 PROCEDURE — 86580 TB INTRADERMAL TEST: CPT | Performed by: NURSE PRACTITIONER

## 2021-09-24 ENCOUNTER — OFFICE VISIT (OUTPATIENT)
Dept: MEDICAL GROUP | Facility: MEDICAL CENTER | Age: 81
End: 2021-09-24
Payer: MEDICARE

## 2021-09-24 VITALS
DIASTOLIC BLOOD PRESSURE: 74 MMHG | RESPIRATION RATE: 16 BRPM | OXYGEN SATURATION: 97 % | BODY MASS INDEX: 27.99 KG/M2 | WEIGHT: 189 LBS | HEIGHT: 69 IN | SYSTOLIC BLOOD PRESSURE: 118 MMHG | HEART RATE: 60 BPM | TEMPERATURE: 97.5 F

## 2021-09-24 DIAGNOSIS — I10 ESSENTIAL HYPERTENSION: ICD-10-CM

## 2021-09-24 DIAGNOSIS — Z91.030 H/O BEE STING ALLERGY: ICD-10-CM

## 2021-09-24 DIAGNOSIS — I77.9 DISORDER OF ARTERIES AND ARTERIOLES, UNSPECIFIED (HCC): ICD-10-CM

## 2021-09-24 DIAGNOSIS — Z02.89 ENCOUNTER FOR COMPLETION OF FORM WITH PATIENT: ICD-10-CM

## 2021-09-24 DIAGNOSIS — E78.5 DYSLIPIDEMIA: ICD-10-CM

## 2021-09-24 PROCEDURE — 99213 OFFICE O/P EST LOW 20 MIN: CPT | Performed by: STUDENT IN AN ORGANIZED HEALTH CARE EDUCATION/TRAINING PROGRAM

## 2021-09-24 NOTE — ASSESSMENT & PLAN NOTE
Patient states history of bee sting allergy.  Patient states that he accidentally ran over a nest and was attacked by multiple bees at once causing an allergic reaction.  Patient states that since then he has been stung by just a single bee and did not have an allergic reaction.  Patient does have an EpiPen.

## 2021-09-24 NOTE — PROGRESS NOTES
"Subjective:     CC: Assisted-living paperwork, hypertension follow-up    HPI:   Michael presents today with   Patient presents today for paperwork for assisted living.  Patient would like to live at [facility] and needs forms completed.  Discussed with patient POLST form and patient opts to be a DNR.  Paperwork has been scanned into patient's chart.  She has no significant medical diagnoses that would prevent him from caring for himself.  Patient is overall healthy.    Essential hypertension  Blood pressure remains well controlled at 118/74 today.  Patient is not currently taking medication for blood pressure.    Dyslipidemia  Patient does continue on atorvastatin 10 mg.    H/O bee sting allergy  Patient states history of bee sting allergy.  Patient states that he accidentally ran over a nest and was attacked by multiple bees at once causing an allergic reaction.  Patient states that since then he has been stung by just a single bee and did not have an allergic reaction.  Patient does have an EpiPen.    Disorder of arteries and arterioles, unspecified (HCC)  Patient was screened using Quanta flow at geriatric care specialist appointment and found to have disorder of arteries.  Patient states that for his whole life he has noted cold lower extremities bilaterally.  Patient may benefit from vascular evaluation.  Patient does state any problems with his lower extremities.        ROS:  Gen: no fevers/chills  Pulm: no sob, no cough  CV: no chest pain, no palpitations  GI: no nausea/vomiting, no diarrhea  : no dysuria  MSk: no myalgias  Skin: no rash  Neuro: no headaches        Objective:     Exam:  /74 (BP Location: Right arm, Patient Position: Sitting, BP Cuff Size: Adult)   Pulse 60   Temp 36.4 °C (97.5 °F) (Temporal)   Resp 16   Ht 1.753 m (5' 9\")   Wt 85.7 kg (189 lb)   SpO2 97%   BMI 27.91 kg/m²  Body mass index is 27.91 kg/m².    Gen: Alert and oriented, No apparent distress.  Neck: Neck is supple " without lymphadenopathy.  Lungs: Normal effort, CTA bilaterally, no wheezes, rhonchi, or rales  CV: Regular rate and rhythm. No murmurs, rubs, or gallops.  Ext: No clubbing, cyanosis, edema.    Assessment & Plan:     81 y.o. male with the following -     1. Encounter for completion of form with patient  Paperwork completed for patient today is for assisted living.  This included a physical exam and POLST form.  Patient does wish to be a DNR.  These forms were scanned into patient prior.    2. Essential hypertension  Pressure remains well controlled without medication.    3. Dyslipidemia  Patient continues on atorvastatin 10 mg    4. H/O bee sting allergy  Patient has EpiPen for as needed.  Patient notes no recent allergic reactions.    5. Disorder of arteries and arterioles, unspecified (HCC)  Quanta flow screening was interpreted as abnormal at geriatric care specialist.  Patient may benefit from further evaluation with vascular.  We will continue to follow.    Return if symptoms worsen or fail to improve.    Please note that this dictation was created using voice recognition software. I have made every reasonable attempt to correct obvious errors, but I expect that there are errors of grammar and possibly content that I did not discover before finalizing the note.

## 2021-09-24 NOTE — ASSESSMENT & PLAN NOTE
Patient was screened using Quanta flow at geriatric care specialist appointment and found to have disorder of arteries.  Patient states that for his whole life he has noted cold lower extremities bilaterally.  Patient may benefit from vascular evaluation.  Patient does state any problems with his lower extremities.

## 2021-09-24 NOTE — ASSESSMENT & PLAN NOTE
Blood pressure remains well controlled at 118/74 today.  Patient is not currently taking medication for blood pressure.

## 2021-09-25 ENCOUNTER — NON-PROVIDER VISIT (OUTPATIENT)
Dept: URGENT CARE | Facility: CLINIC | Age: 81
End: 2021-09-25
Payer: OTHER GOVERNMENT

## 2021-09-25 LAB — TB WHEAL 3D P 5 TU DIAM: NORMAL MM

## 2021-09-30 ENCOUNTER — NON-PROVIDER VISIT (OUTPATIENT)
Dept: OCCUPATIONAL MEDICINE | Facility: CLINIC | Age: 81
End: 2021-09-30

## 2021-09-30 DIAGNOSIS — Z11.1 ENCOUNTER FOR PPD TEST: Primary | ICD-10-CM

## 2021-09-30 PROCEDURE — 86580 TB INTRADERMAL TEST: CPT | Performed by: NURSE PRACTITIONER

## 2021-10-02 ENCOUNTER — NON-PROVIDER VISIT (OUTPATIENT)
Dept: URGENT CARE | Facility: CLINIC | Age: 81
End: 2021-10-02
Payer: OTHER GOVERNMENT

## 2021-10-02 LAB — TB WHEAL 3D P 5 TU DIAM: NORMAL MM

## 2021-10-06 ENCOUNTER — TELEPHONE (OUTPATIENT)
Dept: MEDICAL GROUP | Facility: PHYSICIAN GROUP | Age: 81
End: 2021-10-06

## 2021-10-06 NOTE — TELEPHONE ENCOUNTER
NEW PATIENT VISIT PRE-VISIT PLANNING    1.  EpicCare Patient is checked in Patient Demographics?Yes    2.  Immunizations were updated in Epic using Reconcile Outside Information activity? Yes         3.  Is this appointment scheduled as a Hospital Follow-Up? No    4.  Patient is due for the following Health Maintenance Topics:   There are no preventive care reminders to display for this patient.      5.  Reviewed/Updated the following with patient:       •   Preferred Pharmacy? Yes       •   Preferred Lab? Yes       •   Preferred Communication? Yes       •   Allergies? Yes       •   Medications? YES. Was Abstract Encounter opened and chart updated? YES       •   Social History? No       •   Family History (document living status of immediate family members and if + hx of  cancer, diabetes, hypertension, hyperlipidemia, heart attack, stroke) No    6.  Updated Care Team?       •   DME Company (gait device, O2, CPAP, etc.) NO       •   Other Specialists (eye doctor, derm, GYN, cardiology, endo, etc): NO    7.  AHA (Puls[employer]) form printed for Provider? No, already completed

## 2021-10-08 ENCOUNTER — OFFICE VISIT (OUTPATIENT)
Dept: MEDICAL GROUP | Facility: PHYSICIAN GROUP | Age: 81
End: 2021-10-08
Payer: MEDICARE

## 2021-10-08 VITALS
HEART RATE: 59 BPM | SYSTOLIC BLOOD PRESSURE: 142 MMHG | OXYGEN SATURATION: 99 % | DIASTOLIC BLOOD PRESSURE: 78 MMHG | TEMPERATURE: 98.2 F | RESPIRATION RATE: 18 BRPM | HEIGHT: 69 IN | WEIGHT: 193.3 LBS | BODY MASS INDEX: 28.63 KG/M2

## 2021-10-08 DIAGNOSIS — E55.9 VITAMIN D DEFICIENCY: ICD-10-CM

## 2021-10-08 DIAGNOSIS — M25.552 CHRONIC ARTHRALGIAS OF KNEES AND HIPS: ICD-10-CM

## 2021-10-08 DIAGNOSIS — G89.29 CHRONIC ARTHRALGIAS OF KNEES AND HIPS: ICD-10-CM

## 2021-10-08 DIAGNOSIS — I10 ESSENTIAL HYPERTENSION: ICD-10-CM

## 2021-10-08 DIAGNOSIS — M25.562 CHRONIC ARTHRALGIAS OF KNEES AND HIPS: ICD-10-CM

## 2021-10-08 DIAGNOSIS — E78.5 DYSLIPIDEMIA: ICD-10-CM

## 2021-10-08 DIAGNOSIS — I77.9 DISORDER OF ARTERIES AND ARTERIOLES, UNSPECIFIED (HCC): ICD-10-CM

## 2021-10-08 DIAGNOSIS — M25.551 CHRONIC ARTHRALGIAS OF KNEES AND HIPS: ICD-10-CM

## 2021-10-08 DIAGNOSIS — M25.561 CHRONIC ARTHRALGIAS OF KNEES AND HIPS: ICD-10-CM

## 2021-10-08 DIAGNOSIS — Z86.73 HISTORY OF TIA (TRANSIENT ISCHEMIC ATTACK): ICD-10-CM

## 2021-10-08 PROCEDURE — 99215 OFFICE O/P EST HI 40 MIN: CPT | Performed by: FAMILY MEDICINE

## 2021-10-08 NOTE — PROGRESS NOTES
"Subjective:     CC:    Chief Complaint   Patient presents with   • Establish Care     PCP Dr. Veronica Conrad   • Joint Swelling     All joints \"talking upon occasion\"       HISTORY OF THE PRESENT ILLNESS: Patient is a 81 y.o. male. This pleasant patient is here today to establish care and discuss routine care. His prior PCP was at [facility].  He played competitive sports when he was younger (football, basketball, track, baseball). Had some old injuries to legs/ankles/knees. Continued to play basketball for years then stopped bc of knee pain, then played racquet ball but then had R shoulder issues. Saw [facility] last year about this, was told he had bone spurs and maybe impingement, suggested a steroid injection and PT but the patient deferred. Is doing some PT exercises which helps. Bothers him on occasion when lifting boxes. Will have equipment to use at the [facility].   At night when he sleeps gets a burning sensation in his shoulders, hips, knees, midline lower back. Uses nsaid prn.   Problem   History of Tia (Transient Ischemic Attack)    Was admitted to [facility] for L sided numbness that lasted about a day. Had a negative w/u. Later stopped the asa 81 bc of epistaxis, h/o nose injury from sports years ago. Defers repeat carotid imaging.      Essential Hypertension    Home BP 120s/70s.     Disorder of Arteries and Arterioles, Unspecified (Hcc)    Stable, continue current plan of care  He defers referral to vascular.      Dyslipidemia    On lipitor 10mg, LDL 1/21 110. Thinks he was off it when tested in 1/21. Later resumed it. No side effects to it other than dry mouth.          Allergies: Bee    Current Outpatient Medications Ordered in Epic   Medication Sig Dispense Refill   • multivitamin (THERAGRAN) Tab Take 1 Tablet by mouth every day.     • atorvastatin (LIPITOR) 10 MG Tab TAKE 1 TABLET DAILY 100 tablet 3   • EPINEPHrine 0.3 MG/0.3ML Solution Prefilled Syringe Inject 1 Each as directed one time as needed for up to " 1 dose. 1 Each 11   • LUMIGAN 0.01 % Solution Place 1 Drop in both eyes every bedtime.     • vitamin D (CHOLECALCIFEROL) 1000 UNIT Tab Take 2,000 Units by mouth every day.       No current [facility]-ordered facility-administered medications on file.       Past Medical History:   Diagnosis Date   • Arthritis     arms and legs   • Heart burn     Gerd   • High cholesterol    • Hyperlipidemia    • Hypertension    • suspected TIA 2018    no deficits       Past Surgical History:   Procedure Laterality Date   • INGUINAL HERNIA LAPAROSCOPIC Bilateral 2020    Procedure: REPAIR, HERNIA, INGUINAL, LAPAROSCOPIC- WITH MESH;  Surgeon: Niall Linares M.D.;  Location: SURGERY [facility];  Service: General   • COLONOSCOPY     • TONSILLECTOMY         Social History     Tobacco Use   • Smoking status: Former Smoker     Packs/day: 1.00     Years: 5.00     Pack years: 5.00     Types: Cigarettes     Quit date: 1969     Years since quittin.7   • Smokeless tobacco: Never Used   • Tobacco comment: Started smoking at age [age] quit started again at [age]   Vaping Use   • Vaping Use: Never used   Substance Use Topics   • Alcohol use: Yes     Alcohol/week: 0.0 - 1.2 oz   • Drug use: No       Social History     Social History Narrative    He is originally from [place], his wife   (she had autoimmune disease and lung and liver problems). They did not have children. His in-laws live in the [place]. He hasn't seen his siblings in years. He walks twice a day, is moving into an [facility] nearby. Is looking forward to more social opportunities. 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 He used to enjoy crossword puzzles but struggles with his vision now.     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        Family History   Problem Relation Age of Onset   • Diabetes Mother    • Diabetes Sister    • Other Brother         Spinal Miningitis   •  "Schizophrenia Brother    • Diabetes Sister    • Dementia Maternal Grandmother    • Dementia Paternal Grandmother    • Colon Cancer Paternal Grandfather    • Cancer Brother         Esophigial       Health Maintenance: Completed    ROS:   See HPI      Objective:     Exam:   /78 (BP Location: Left arm, Patient Position: Sitting, BP Cuff Size: Adult)   Pulse (!) 59   Temp 36.8 °C (98.2 °F) (Temporal)   Resp 18   Ht 1.753 m (5' 9\")   Wt 87.7 kg (193 lb 4.8 oz)   SpO2 99%   BMI 28.55 kg/m²   Body mass index is 28.55 kg/m².  Wt Readings from Last 4 Encounters:   10/08/21 87.7 kg (193 lb 4.8 oz)   09/24/21 85.7 kg (189 lb)   07/26/21 87.8 kg (193 lb 9.6 oz)   05/27/21 92.1 kg (203 lb)     General: Normal appearing. No distress. Appropriately groomed.  HEENT: Normocephalic. Eyes conjunctiva clear lids without ptosis, pupils equal and reactive to light accommodation, ears normal shape and contour, canals are clear bilaterally, tympanic membranes are benign, nasal mucosa benign, oropharynx is without erythema, edema or exudates.   Neck: Supple without JVD or bruit. Thyroid is not enlarged.   Pulmonary: Clear to ausculation.  Normal effort. No rales, ronchi, or wheezing.  Cardiovascular: Regular rate and rhythm without murmur. Carotid and radial pulses are intact and equal bilaterally.  Abdomen: Soft, nontender, nondistended. Normal bowel sounds. Liver and spleen are not palpable  Neurologic: Grossly nonfocal  Lymph: No cervical or supraclavicular lymph nodes are palpable  Skin: Warm and dry.  No obvious lesions.  Musculoskeletal: Normal gait. No extremity cyanosis, clubbing, or edema.  Psych: Normal mood and affect. Alert and oriented x3. Judgment and insight is normal.      Assessment & Plan:   81 y.o. male with the following -  rec shingrix vaccine in few wks as just had booster for COVID  rec tylenol qhs + stretching/yoga/pilates  1. Dyslipidemia    2. History of TIA (transient ischemic attack)    3. Vitamin D " deficiency  - VITAMIN D,25 HYDROXY; Future    4. Essential hypertension  - Comp Metabolic Panel; Future  - CBC WITH DIFFERENTIAL; Future  - Lipid Profile; Future  - TSH; Future    5. Disorder of arteries and arterioles, unspecified (HCC)    6. Chronic arthralgias of knees and hips  - ANGUS REFLEXIVE PROFILE; Future  - CRP QUANTITIVE (NON-CARDIAC); Future  - Sed Rate; Future  - URIC ACID; Future  - RHEUMATOID ARTHRITIS FACTOR; Future    Other orders  - multivitamin (THERAGRAN) Tab; Take 1 Tablet by mouth every day.       Return in about 6 months (around 4/8/2022) for AWV.  I spent a total of 41 minutes with record review, exam, communication with the patient, communication with other providers, and documentation of this encounter.    Please note that this dictation was created using voice recognition software. I have made every reasonable attempt to correct obvious errors, but I expect that there are errors of grammar and possibly content that I did not discover before finalizing the note.

## 2021-10-13 ENCOUNTER — HOSPITAL ENCOUNTER (OUTPATIENT)
Dept: LAB | Facility: MEDICAL CENTER | Age: 81
End: 2021-10-13
Attending: FAMILY MEDICINE
Payer: MEDICARE

## 2021-10-13 DIAGNOSIS — M25.552 CHRONIC ARTHRALGIAS OF KNEES AND HIPS: ICD-10-CM

## 2021-10-13 DIAGNOSIS — E55.9 VITAMIN D DEFICIENCY: ICD-10-CM

## 2021-10-13 DIAGNOSIS — M25.551 CHRONIC ARTHRALGIAS OF KNEES AND HIPS: ICD-10-CM

## 2021-10-13 DIAGNOSIS — M25.561 CHRONIC ARTHRALGIAS OF KNEES AND HIPS: ICD-10-CM

## 2021-10-13 DIAGNOSIS — G89.29 CHRONIC ARTHRALGIAS OF KNEES AND HIPS: ICD-10-CM

## 2021-10-13 DIAGNOSIS — M25.562 CHRONIC ARTHRALGIAS OF KNEES AND HIPS: ICD-10-CM

## 2021-10-13 DIAGNOSIS — I10 ESSENTIAL HYPERTENSION: ICD-10-CM

## 2021-10-13 LAB
25(OH)D3 SERPL-MCNC: 42 NG/ML (ref 30–100)
ALBUMIN SERPL BCP-MCNC: 4.2 G/DL (ref 3.2–4.9)
ALBUMIN/GLOB SERPL: 1.6 G/DL
ALP SERPL-CCNC: 55 U/L (ref 30–99)
ALT SERPL-CCNC: 24 U/L (ref 2–50)
ANION GAP SERPL CALC-SCNC: 7 MMOL/L (ref 7–16)
AST SERPL-CCNC: 30 U/L (ref 12–45)
BASOPHILS # BLD AUTO: 0.5 % (ref 0–1.8)
BASOPHILS # BLD: 0.02 K/UL (ref 0–0.12)
BILIRUB SERPL-MCNC: 0.9 MG/DL (ref 0.1–1.5)
BUN SERPL-MCNC: 15 MG/DL (ref 8–22)
CALCIUM SERPL-MCNC: 9.4 MG/DL (ref 8.5–10.5)
CHLORIDE SERPL-SCNC: 107 MMOL/L (ref 96–112)
CHOLEST SERPL-MCNC: 130 MG/DL (ref 100–199)
CK SERPL-CCNC: 262 U/L (ref 0–154)
CO2 SERPL-SCNC: 27 MMOL/L (ref 20–33)
CREAT SERPL-MCNC: 0.78 MG/DL (ref 0.5–1.4)
CRP SERPL HS-MCNC: <0.3 MG/DL (ref 0–0.75)
EOSINOPHIL # BLD AUTO: 0.02 K/UL (ref 0–0.51)
EOSINOPHIL NFR BLD: 0.5 % (ref 0–6.9)
ERYTHROCYTE [DISTWIDTH] IN BLOOD BY AUTOMATED COUNT: 46 FL (ref 35.9–50)
ERYTHROCYTE [SEDIMENTATION RATE] IN BLOOD BY WESTERGREN METHOD: 7 MM/HOUR (ref 0–20)
FASTING STATUS PATIENT QL REPORTED: NORMAL
GLOBULIN SER CALC-MCNC: 2.6 G/DL (ref 1.9–3.5)
GLUCOSE SERPL-MCNC: 103 MG/DL (ref 65–99)
HCT VFR BLD AUTO: 42.8 % (ref 42–52)
HDLC SERPL-MCNC: 55 MG/DL
HGB BLD-MCNC: 15.1 G/DL (ref 14–18)
IMM GRANULOCYTES # BLD AUTO: 0.02 K/UL (ref 0–0.11)
IMM GRANULOCYTES NFR BLD AUTO: 0.5 % (ref 0–0.9)
LDLC SERPL CALC-MCNC: 64 MG/DL
LYMPHOCYTES # BLD AUTO: 1.17 K/UL (ref 1–4.8)
LYMPHOCYTES NFR BLD: 31.1 % (ref 22–41)
MCH RBC QN AUTO: 34.2 PG (ref 27–33)
MCHC RBC AUTO-ENTMCNC: 35.3 G/DL (ref 33.7–35.3)
MCV RBC AUTO: 96.8 FL (ref 81.4–97.8)
MONOCYTES # BLD AUTO: 0.51 K/UL (ref 0–0.85)
MONOCYTES NFR BLD AUTO: 13.6 % (ref 0–13.4)
NEUTROPHILS # BLD AUTO: 2.02 K/UL (ref 1.82–7.42)
NEUTROPHILS NFR BLD: 53.8 % (ref 44–72)
NRBC # BLD AUTO: 0 K/UL
NRBC BLD-RTO: 0 /100 WBC
PLATELET # BLD AUTO: 137 K/UL (ref 164–446)
PMV BLD AUTO: 11.5 FL (ref 9–12.9)
POTASSIUM SERPL-SCNC: 4.2 MMOL/L (ref 3.6–5.5)
PROT SERPL-MCNC: 6.8 G/DL (ref 6–8.2)
RBC # BLD AUTO: 4.42 M/UL (ref 4.7–6.1)
RHEUMATOID FACT SER IA-ACNC: 11 IU/ML (ref 0–14)
SODIUM SERPL-SCNC: 141 MMOL/L (ref 135–145)
TRIGL SERPL-MCNC: 55 MG/DL (ref 0–149)
TSH SERPL DL<=0.005 MIU/L-ACNC: 1.54 UIU/ML (ref 0.38–5.33)
URATE SERPL-MCNC: 6.2 MG/DL (ref 2.5–8.3)
WBC # BLD AUTO: 3.8 K/UL (ref 4.8–10.8)

## 2021-10-13 PROCEDURE — 80061 LIPID PANEL: CPT

## 2021-10-13 PROCEDURE — 86431 RHEUMATOID FACTOR QUANT: CPT

## 2021-10-13 PROCEDURE — 85652 RBC SED RATE AUTOMATED: CPT

## 2021-10-13 PROCEDURE — 80053 COMPREHEN METABOLIC PANEL: CPT

## 2021-10-13 PROCEDURE — 86039 ANTINUCLEAR ANTIBODIES (ANA): CPT

## 2021-10-13 PROCEDURE — 82550 ASSAY OF CK (CPK): CPT

## 2021-10-13 PROCEDURE — 86038 ANTINUCLEAR ANTIBODIES: CPT

## 2021-10-13 PROCEDURE — 84550 ASSAY OF BLOOD/URIC ACID: CPT

## 2021-10-13 PROCEDURE — 36415 COLL VENOUS BLD VENIPUNCTURE: CPT

## 2021-10-13 PROCEDURE — 86225 DNA ANTIBODY NATIVE: CPT

## 2021-10-13 PROCEDURE — 85025 COMPLETE CBC W/AUTO DIFF WBC: CPT

## 2021-10-13 PROCEDURE — 86140 C-REACTIVE PROTEIN: CPT

## 2021-10-13 PROCEDURE — 86235 NUCLEAR ANTIGEN ANTIBODY: CPT | Mod: 91

## 2021-10-13 PROCEDURE — 84443 ASSAY THYROID STIM HORMONE: CPT

## 2021-10-13 PROCEDURE — 82306 VITAMIN D 25 HYDROXY: CPT

## 2021-10-15 LAB — NUCLEAR IGG SER QL IA: DETECTED

## 2021-10-16 LAB
ANA INTERPRETIVE COMMENT Q5143: ABNORMAL
ANA PATTERN Q5144: ABNORMAL
ANA TITER Q5145: ABNORMAL
ANTINUCLEAR ANTIBODY (ANA), HEP-2, IGG Q5142: DETECTED

## 2021-10-17 LAB
DSDNA AB TITR SER CLIF: 13 IU (ref 0–24)
ENA JO1 AB TITR SER: 8 AU/ML (ref 0–40)
ENA SCL70 IGG SER QL: 1 AU/ML (ref 0–40)
ENA SM IGG SER-ACNC: 3 AU/ML (ref 0–40)
ENA SS-B IGG SER IA-ACNC: 0 AU/ML (ref 0–40)
SSA52 R0ENA AB IGG Q0420: 4 AU/ML (ref 0–40)
SSA60 R0ENA AB IGG Q0419: 1 AU/ML (ref 0–40)

## 2021-10-18 LAB — U1 SNRNP IGG SER QL: 7

## 2021-11-19 DIAGNOSIS — M25.562 CHRONIC ARTHRALGIAS OF KNEES AND HIPS: ICD-10-CM

## 2021-11-19 DIAGNOSIS — M25.561 CHRONIC ARTHRALGIAS OF KNEES AND HIPS: ICD-10-CM

## 2021-11-19 DIAGNOSIS — M25.552 CHRONIC ARTHRALGIAS OF KNEES AND HIPS: ICD-10-CM

## 2021-11-19 DIAGNOSIS — M25.551 CHRONIC ARTHRALGIAS OF KNEES AND HIPS: ICD-10-CM

## 2021-11-19 DIAGNOSIS — R76.8 ANA POSITIVE: ICD-10-CM

## 2021-11-19 DIAGNOSIS — G89.29 CHRONIC ARTHRALGIAS OF KNEES AND HIPS: ICD-10-CM

## 2021-11-19 NOTE — PROGRESS NOTES
I left him a message on his voicemail letting him know that I would like him to see a rheumatologist regarding some abnormalities on his blood work.  I changed his lab test because of the arthralgias he was noting in his knees and hips.  The test is not definitive which is why I would like him to obtain a second opinion from a rheumatologist.  It does not need to be an urgent appointment.    Please call him and make sure he heard the message I left on his machine and understands my directions.

## 2021-12-08 NOTE — PROGRESS NOTES
Was unable to get ahold of patient. However, referrals department has processed his rhumatology referral to [facility] and patient has received the message.

## 2022-02-24 ENCOUNTER — HOSPITAL ENCOUNTER (OUTPATIENT)
Facility: MEDICAL CENTER | Age: 82
End: 2022-02-24
Attending: FAMILY MEDICINE
Payer: MEDICARE

## 2022-02-24 ENCOUNTER — OFFICE VISIT (OUTPATIENT)
Dept: MEDICAL GROUP | Facility: PHYSICIAN GROUP | Age: 82
End: 2022-02-24
Payer: MEDICARE

## 2022-02-24 VITALS
OXYGEN SATURATION: 97 % | HEART RATE: 61 BPM | DIASTOLIC BLOOD PRESSURE: 82 MMHG | SYSTOLIC BLOOD PRESSURE: 112 MMHG | TEMPERATURE: 97.2 F | BODY MASS INDEX: 27.18 KG/M2 | WEIGHT: 183.5 LBS | HEIGHT: 69 IN | RESPIRATION RATE: 16 BRPM

## 2022-02-24 DIAGNOSIS — R14.0 ABDOMINAL DISTENSION (GASEOUS): ICD-10-CM

## 2022-02-24 DIAGNOSIS — R10.13 EPIGASTRIC PAIN: ICD-10-CM

## 2022-02-24 DIAGNOSIS — R19.8 ABDOMINAL FULLNESS: ICD-10-CM

## 2022-02-24 PROCEDURE — 83690 ASSAY OF LIPASE: CPT

## 2022-02-24 PROCEDURE — 36415 COLL VENOUS BLD VENIPUNCTURE: CPT | Performed by: FAMILY MEDICINE

## 2022-02-24 PROCEDURE — 86140 C-REACTIVE PROTEIN: CPT

## 2022-02-24 PROCEDURE — 99000 SPECIMEN HANDLING OFFICE-LAB: CPT | Performed by: FAMILY MEDICINE

## 2022-02-24 PROCEDURE — 86039 ANTINUCLEAR ANTIBODIES (ANA): CPT

## 2022-02-24 PROCEDURE — 85027 COMPLETE CBC AUTOMATED: CPT

## 2022-02-24 PROCEDURE — 80053 COMPREHEN METABOLIC PANEL: CPT

## 2022-02-24 PROCEDURE — 99213 OFFICE O/P EST LOW 20 MIN: CPT | Performed by: FAMILY MEDICINE

## 2022-02-24 PROCEDURE — 85652 RBC SED RATE AUTOMATED: CPT

## 2022-02-24 PROCEDURE — 85007 BL SMEAR W/DIFF WBC COUNT: CPT

## 2022-02-24 PROCEDURE — 86038 ANTINUCLEAR ANTIBODIES: CPT

## 2022-02-24 RX ORDER — LISINOPRIL 10 MG/1
TABLET ORAL
COMMUNITY
End: 2022-02-24

## 2022-02-24 RX ORDER — CHOLECALCIFEROL (VITAMIN D3) 1250 MCG
CAPSULE ORAL
COMMUNITY
End: 2022-03-17

## 2022-02-24 RX ORDER — ATORVASTATIN CALCIUM 10 MG/1
TABLET, FILM COATED ORAL
Qty: 100 TABLET | Refills: 3 | Status: SHIPPED | OUTPATIENT
Start: 2022-02-24 | End: 2023-01-31 | Stop reason: SDUPTHER

## 2022-02-24 ASSESSMENT — PATIENT HEALTH QUESTIONNAIRE - PHQ9: CLINICAL INTERPRETATION OF PHQ2 SCORE: 0

## 2022-02-24 ASSESSMENT — FIBROSIS 4 INDEX: FIB4 SCORE: 3.58

## 2022-02-24 NOTE — PROGRESS NOTES
Patient arrived for blood draw.   Verified patient's name/date-of-birth and reason for visit before procedure was started. Patient's right antecubital cleansed. Venipuncture attempts X1. Blood draw completed on patient's left AC. Applied pressure afterwards and placed Coban on site of venipuncture with directions for patient to remove within the next hour. Patient tolerated procedure well, no adverse reactions. Patient ambulated safely upon leaving clinic.   Completed labels were placed on blood samples in draw room with patient present. Appropriate blood samples were centrifuged. Blood samples were then placed in locked lab box for  pick-up.

## 2022-02-24 NOTE — PROGRESS NOTES
"Subjective:     CC:   Chief Complaint   Patient presents with   • Follow-Up   • Medication Management   • Loose Stools     Not diarrhea light greyish, brown, some abdominal pain,          HPI:   Michael presents today with GI c/o  He had a visit  with [facility], his clock drawing was 0 out of 2, the provider was unable to get accurate results of a Quanta flow test.  Moved into an [employer] 10/14/21 in part to have more social activities and meals after his wife had . He feels it was useful but plans to move \"back into the real world soon.\"  Is concerned that he has always had normal daily stool but over the past several months he's noted small stools that are  and float and has a lot of gas. Occasionally has a stomachache after eating, epigastric, a dull ache - mild that lasts about 10min, hasn't tried anything for it.   No blood in stool, a little gray/light brown. No loss of appetite, has lost a few lb and unsure why.   He did bloodwork 10/21 and had some autoimmune abnormalities, I referred him to rheum, he doesn't have an appt til . His back and hip pain have improved w/ shoe inserts and his shoulder is improving w/ some home PT exercises.  His ANGUS was +speckled 1:160, cpk 262, wbc 3.8, plt 137   Did have a small ext hemorrhoid about 10m ago that self resolved.   No problems updated.    Current Outpatient Medications Ordered in Epic   Medication Sig Dispense Refill   • Cholecalciferol (VITAMIN D3) 1.25 MG (27317 UT) Cap      • multivitamin (THERAGRAN) Tab Take 1 Tablet by mouth every day.     • atorvastatin (LIPITOR) 10 MG Tab TAKE 1 TABLET DAILY 100 Tablet 3   • Acetaminophen (TYLENOL 8 HOUR PO) Take  by mouth.     • LUMIGAN 0.01 % Solution Place 1 Drop in both eyes every bedtime.     • vitamin D (CHOLECALCIFEROL) 1000 UNIT Tab Take 2,000 Units by mouth every day.     • lisinopril (PRINIVIL) 10 MG Tab take 1 tablet (10 mg) by oral route once daily (Patient not taking: " "Reported on 2022)     • EPINEPHrine 0.3 MG/0.3ML Solution Prefilled Syringe Inject 1 Each as directed one time as needed for up to 1 dose. (Patient not taking: Reported on 2022) 1 Each 11     No current [facility]-ordered facility-administered medications on file.       Past Medical History:   Diagnosis Date   • Arthritis     arms and legs   • Heart burn     Gerd   • High cholesterol    • Hyperlipidemia    • Hypertension    • suspected TIA     no deficits       Social History     Tobacco Use   • Smoking status: Former Smoker     Packs/day: 1.00     Years: 5.00     Pack years: 5.00     Types: Cigarettes     Quit date: 1969     Years since quittin.0   • Smokeless tobacco: Never Used   • Tobacco comment: Started smoking at age [age] quit started again at [age]   Vaping Use   • Vaping Use: Never used   Substance Use Topics   • Alcohol use: Yes     Alcohol/week: 0.0 - 1.2 oz   • Drug use: No       Allergies:  Bee    Health Maintenance: Completed    ROS:  Per HPI      Objective:       Exam:  /82 (BP Location: Right arm, Patient Position: Sitting, BP Cuff Size: Adult)   Pulse 61   Temp 36.2 °C (97.2 °F) (Temporal)   Resp 16   Ht 1.753 m (5' 9\")   Wt 83.2 kg (183 lb 8 oz)   SpO2 97%   BMI 27.10 kg/m²   Body mass index is 27.1 kg/m².  Wt Readings from Last 4 Encounters:   22 83.2 kg (183 lb 8 oz)   22 84.4 kg (186 lb)   10/08/21 87.7 kg (193 lb 4.8 oz)   21 85.7 kg (189 lb)       Gen: Alert and oriented, No apparent distress. Appropriately groomed.  Neck: Neck is supple without lymphadenopathy.No thyromegaly.   Lungs: Normal effort, CTA bilaterally, no wheezes, rhonchi, or rales.  CV: Regular rate and rhythm. No murmurs, rubs, or gallops.  ABD:  Soft, mildly tender generalized, mildly generalized distended, NABSx4, no HSM or RBT or guarding fullness to mid abd- unable to differentiate between fullness vs possible mass.  Ext: No clubbing, cyanosis, edema.  Skin: No rash " noted.      Assessment & Plan:     82 y.o. male with the following -   Check ct abd/pelv r/o mass bc of sxs and abnl exam today  In next few wk, change to stat if unable to schedule in next few wk  Add fiber and water  1. Epigastric pain  - COLOGUARD (FIT DNA)  - CT-ABDOMEN-PELVIS WITH & W/O; Future  - ANTI-NUCLEAR ANTIBODY SERUM; Future  - CBC WITH DIFFERENTIAL; Future  - Comp Metabolic Panel; Future  - LIPASE; Future  - Sed Rate; Future  - CRP QUANTITIVE (NON-CARDIAC); Future    2. Abdominal fullness  - COLOGUARD (FIT DNA)  - ANTI-NUCLEAR ANTIBODY SERUM; Future  - CBC WITH DIFFERENTIAL; Future  - Comp Metabolic Panel; Future  - LIPASE; Future  - Sed Rate; Future  - CRP QUANTITIVE (NON-CARDIAC); Future    3. Abdominal distension (gaseous)  - COLOGUARD (FIT DNA)  - ANTI-NUCLEAR ANTIBODY SERUM; Future  - CBC WITH DIFFERENTIAL; Future  - Comp Metabolic Panel; Future  - LIPASE; Future  - Sed Rate; Future  - CRP QUANTITIVE (NON-CARDIAC); Future    Other orders  - lisinopril (PRINIVIL) 10 MG Tab; take 1 tablet (10 mg) by oral route once daily (Patient not taking: Reported on 2/24/2022)  - Cholecalciferol (VITAMIN D3) 1.25 MG (07867 UT) Cap  - multivitamin (THERAGRAN) Tab; Take 1 Tablet by mouth every day.  - atorvastatin (LIPITOR) 10 MG Tab; TAKE 1 TABLET DAILY  Dispense: 100 Tablet; Refill: 3        I spent a total of 24 minutes with record review, exam, communication with the patient, communication with other providers, and documentation of this encounter.      Return in about 3 weeks (around 3/17/2022).    Please note that this dictation was created using voice recognition software. I have made every reasonable attempt to correct obvious errors, but I expect that there are errors of grammar and possibly content that I did not discover before finalizing the note.

## 2022-02-24 NOTE — PATIENT INSTRUCTIONS
Add fiber supplement like metamucil and a few extra glasses of water every day    Make sure you get the CT scan before you come back to see me

## 2022-02-25 LAB
ALBUMIN SERPL BCP-MCNC: 4.5 G/DL (ref 3.2–4.9)
ALBUMIN/GLOB SERPL: 1.8 G/DL
ALP SERPL-CCNC: 56 U/L (ref 30–99)
ALT SERPL-CCNC: 18 U/L (ref 2–50)
ANION GAP SERPL CALC-SCNC: 11 MMOL/L (ref 7–16)
AST SERPL-CCNC: 27 U/L (ref 12–45)
BASOPHILS # BLD AUTO: 0 % (ref 0–1.8)
BASOPHILS # BLD: 0 K/UL (ref 0–0.12)
BILIRUB SERPL-MCNC: 0.9 MG/DL (ref 0.1–1.5)
BUN SERPL-MCNC: 13 MG/DL (ref 8–22)
CALCIUM SERPL-MCNC: 10 MG/DL (ref 8.5–10.5)
CHLORIDE SERPL-SCNC: 104 MMOL/L (ref 96–112)
CO2 SERPL-SCNC: 26 MMOL/L (ref 20–33)
CREAT SERPL-MCNC: 0.89 MG/DL (ref 0.5–1.4)
CRP SERPL HS-MCNC: <0.3 MG/DL (ref 0–0.75)
EOSINOPHIL # BLD AUTO: 0 K/UL (ref 0–0.51)
EOSINOPHIL NFR BLD: 0 % (ref 0–6.9)
ERYTHROCYTE [DISTWIDTH] IN BLOOD BY AUTOMATED COUNT: 48 FL (ref 35.9–50)
ERYTHROCYTE [SEDIMENTATION RATE] IN BLOOD BY WESTERGREN METHOD: 5 MM/HOUR (ref 0–20)
GLOBULIN SER CALC-MCNC: 2.5 G/DL (ref 1.9–3.5)
GLUCOSE SERPL-MCNC: 102 MG/DL (ref 65–99)
HCT VFR BLD AUTO: 45.6 % (ref 42–52)
HGB BLD-MCNC: 15.1 G/DL (ref 14–18)
LIPASE SERPL-CCNC: 54 U/L (ref 11–82)
LYMPHOCYTES # BLD AUTO: 1.43 K/UL (ref 1–4.8)
LYMPHOCYTES NFR BLD: 29.2 % (ref 22–41)
MANUAL DIFF BLD: NORMAL
MCH RBC QN AUTO: 33.3 PG (ref 27–33)
MCHC RBC AUTO-ENTMCNC: 33.1 G/DL (ref 33.7–35.3)
MCV RBC AUTO: 100.7 FL (ref 81.4–97.8)
MONOCYTES # BLD AUTO: 0.56 K/UL (ref 0–0.85)
MONOCYTES NFR BLD AUTO: 11.5 % (ref 0–13.4)
MORPHOLOGY BLD-IMP: NORMAL
NEUTROPHILS # BLD AUTO: 2.91 K/UL (ref 1.82–7.42)
NEUTROPHILS NFR BLD: 59.3 % (ref 44–72)
NRBC # BLD AUTO: 0 K/UL
NRBC BLD-RTO: 0 /100 WBC
PLATELET # BLD AUTO: 119 K/UL (ref 164–446)
PLATELET BLD QL SMEAR: NORMAL
PMV BLD AUTO: 12.5 FL (ref 9–12.9)
POTASSIUM SERPL-SCNC: 4.3 MMOL/L (ref 3.6–5.5)
PROT SERPL-MCNC: 7 G/DL (ref 6–8.2)
RBC # BLD AUTO: 4.53 M/UL (ref 4.7–6.1)
RBC BLD AUTO: PRESENT
SODIUM SERPL-SCNC: 141 MMOL/L (ref 135–145)
VARIANT LYMPHS BLD QL SMEAR: NORMAL
WBC # BLD AUTO: 4.9 K/UL (ref 4.8–10.8)

## 2022-02-27 LAB — NUCLEAR IGG SER QL IA: DETECTED

## 2022-02-28 ENCOUNTER — HOSPITAL ENCOUNTER (OUTPATIENT)
Dept: RADIOLOGY | Facility: MEDICAL CENTER | Age: 82
End: 2022-02-28
Attending: FAMILY MEDICINE
Payer: MEDICARE

## 2022-02-28 DIAGNOSIS — R10.13 EPIGASTRIC PAIN: ICD-10-CM

## 2022-02-28 PROCEDURE — 700117 HCHG RX CONTRAST REV CODE 255: Performed by: FAMILY MEDICINE

## 2022-02-28 PROCEDURE — 74177 CT ABD & PELVIS W/CONTRAST: CPT | Mod: ME

## 2022-02-28 RX ADMIN — IOHEXOL 100 ML: 350 INJECTION, SOLUTION INTRAVENOUS at 08:05

## 2022-03-03 ENCOUNTER — TELEPHONE (OUTPATIENT)
Dept: MEDICAL GROUP | Facility: PHYSICIAN GROUP | Age: 82
End: 2022-03-03
Payer: MEDICARE

## 2022-03-03 DIAGNOSIS — I72.3 ANEURYSM OF COMMON ILIAC ARTERY (HCC): ICD-10-CM

## 2022-03-04 NOTE — TELEPHONE ENCOUNTER
Patient stopped by clini. Relayed results. Patient has appoointment with rheum in August. Will keep an eye on symptoms, and follow up with vascular once referral comes through.

## 2022-03-04 NOTE — TELEPHONE ENCOUNTER
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 I do not think this is affecting him but should be monitored to ensure it doesn't worsen. I referred him to vascular for monitoring and possible management.   Labs showed persisted abnormal autoimmune concern, keep appt to see rheum.    Keep appt with Eloisa to review results in more detail please.

## 2022-03-17 ENCOUNTER — OFFICE VISIT (OUTPATIENT)
Dept: MEDICAL GROUP | Facility: PHYSICIAN GROUP | Age: 82
End: 2022-03-17
Payer: MEDICARE

## 2022-03-17 VITALS
BODY MASS INDEX: 27.62 KG/M2 | WEIGHT: 186.5 LBS | DIASTOLIC BLOOD PRESSURE: 70 MMHG | RESPIRATION RATE: 14 BRPM | HEART RATE: 62 BPM | SYSTOLIC BLOOD PRESSURE: 134 MMHG | HEIGHT: 69 IN | OXYGEN SATURATION: 99 % | TEMPERATURE: 98.2 F

## 2022-03-17 DIAGNOSIS — I72.3 ANEURYSM OF COMMON ILIAC ARTERY (HCC): ICD-10-CM

## 2022-03-17 DIAGNOSIS — R76.8 ANA POSITIVE: ICD-10-CM

## 2022-03-17 PROCEDURE — 99213 OFFICE O/P EST LOW 20 MIN: CPT | Performed by: FAMILY MEDICINE

## 2022-03-17 ASSESSMENT — FIBROSIS 4 INDEX: FIB4 SCORE: 4.28

## 2022-03-17 NOTE — PROGRESS NOTES
Subjective:     CC:   Chief Complaint   Patient presents with   • Results     Ct, labs, pt was advised to come back into office by pcp         HPI:   Michael presents today with follow up on CT scan and lab results.  At his previous visit he was having epigastric pain and loose frequent stools.  He started taking Metemucil once daily which has helped bulk his stools up and is drinking more water which resolved his bowel issues.  States that he did do the Cologuard test however the results are still not in.  We will notify him via [facility] of the results once we obtain them.    Aneurysm of common iliac artery (HCC)  CT results did show bilateral common iliac artery aneurysms at 2.1 cm bilaterally.  It also showed atherosclerosis,  Which she is currently on statin therapy atorvastatin 10 mg daily.  He states that he does not take ASA 81 mg due to frequent excessive nosebleeds.  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  Referral to vascular was placed by his PCP once his CT resulted.  He has not received a phone call to set this up however he should by next week receive a call to make an appointment if not he will let us know.  Discussed maintaining good blood pressure control, today in office it was 134/70.        ANGUS positive  ANGUS positive back in October 2021, was rechecked again in February 2022 remains positive.  He does have a rheumatology appointment in August 2022 with [facility].  He continues to have right sided clavicle pain and shoulder discomfort.  Denies any girdle pain, headaches, vision changes, or pain around his temple area.    Antinuclear Antibody None Detected Detected Abnormal   Detected Abnormal  CM      ANGUS Pattern Speckled Abnormal     ANGUS Titer 1:160 Abnormal       Antinuclear Antibody (ANGUS), HEp-2, IgG <1:80 Detected High   Detected High             Current Outpatient Medications Ordered in Epic   Medication Sig Dispense Refill   • multivitamin (THERAGRAN) Tab Take 1 Tablet by mouth  "every day.     • atorvastatin (LIPITOR) 10 MG Tab TAKE 1 TABLET DAILY 100 Tablet 3   • Acetaminophen (TYLENOL 8 HOUR PO) Take  by mouth.     • LUMIGAN 0.01 % Solution Place 1 Drop in both eyes every bedtime.     • vitamin D (CHOLECALCIFEROL) 1000 UNIT Tab Take 2,000 Units by mouth every day.     • EPINEPHrine 0.3 MG/0.3ML Solution Prefilled Syringe Inject 1 Each as directed one time as needed for up to 1 dose. (Patient not taking: Reported on 3/17/2022) 1 Each 11     No current [facility]-ordered facility-administered medications on file.       Health Maintenance: Completed    ROS:  Gen: no fevers/chills, no changes in weight  Eyes: no changes in vision  ENT: no sore throat, no hearing loss, no bloody nose  Pulm: no sob, no cough  CV: no chest pain, no palpitations  GI: no nausea/vomiting, no diarrhea  : no dysuria  MSk: no myalgias  Skin: no rash  Neuro: no headaches, no numbness/tingling  Heme/Lymph: no easy bruising      Objective:     Exam:  /70 (BP Location: Left arm, Patient Position: Sitting, BP Cuff Size: Adult)   Pulse 62   Temp 36.8 °C (98.2 °F) (Temporal)   Resp 14   Ht 1.753 m (5' 9\")   Wt 84.6 kg (186 lb 8 oz)   SpO2 99%   BMI 27.54 kg/m²  Body mass index is 27.54 kg/m².    Gen: Alert and oriented, No apparent distress.  Neck: Neck is supple without lymphadenopathy.  Lungs: Normal effort, CTA bilaterally, no wheezes, rhonchi, or rales  CV: Regular rate and rhythm. + murmurs, rubs, or gallops.  Ext: No clubbing, cyanosis, edema.    A chaperone was offered to the patient during today's exam. Patient declined chaperone.      Assessment & Plan:     82 y.o. male with the following -     1. Aneurysm of common iliac artery (HCC)  Newly found on recent CT.  Referral to vascular placed.  Discussed with patient if he does not hear back from referrals department next week to let us know so we can check up on his referral.  Discussed maintaining good blood pressure control.    2. ANGUS positive   Recently " found 5 months ago, rheumatology referral was placed at that time.  He does have an appointment with [facility] in August 2022.    I spent a total of 31 minutes with record review, exam, communication with the patient, communication with other providers, and documentation of this encounter.      No follow-ups on file.    Please note that this dictation was created using voice recognition software. I have made every reasonable attempt to correct obvious errors, but I expect that there are errors of grammar and possibly content that I did not discover before finalizing the note.

## 2022-03-17 NOTE — ASSESSMENT & PLAN NOTE
ANGUS positive back in October 2021, was rechecked again in February 2022 remains positive.  He does have a rheumatology appointment in August 2022 with [facility].  He continues to have right sided clavicle pain and shoulder discomfort.  Denies any girdle pain, headaches, vision changes, or pain around his temple area.    Antinuclear Antibody None Detected Detected Abnormal   Detected Abnormal  CM      ANGUS Pattern Speckled Abnormal     ANGUS Titer 1:160 Abnormal       Antinuclear Antibody (ANGUS), HEp-2, IgG <1:80 Detected High   Detected High

## 2022-03-17 NOTE — ASSESSMENT & PLAN NOTE
CT results did show bilateral common iliac artery aneurysms at 2.1 cm bilaterally.  It also showed atherosclerosis,  Which she is currently on statin therapy atorvastatin 10 mg daily.  He states that he does not take ASA 81 mg due to frequent excessive nosebleeds.  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  Referral to vascular was placed by his PCP once his CT resulted.  He has not received a phone call to set this up however he should by next week receive a call to make an appointment if not he will let us know.  Discussed maintaining good blood pressure control, today in office it was 134/70.

## 2022-06-20 ENCOUNTER — HOSPITAL ENCOUNTER (OUTPATIENT)
Dept: RADIOLOGY | Facility: MEDICAL CENTER | Age: 82
End: 2022-06-20
Attending: SURGERY
Payer: MEDICARE

## 2022-06-20 DIAGNOSIS — I72.3 ANEURYSM OF ILIAC ARTERY (HCC): ICD-10-CM

## 2022-06-20 PROCEDURE — 93922 UPR/L XTREMITY ART 2 LEVELS: CPT

## 2022-08-03 ENCOUNTER — TELEPHONE (OUTPATIENT)
Dept: SCHEDULING | Facility: IMAGING CENTER | Age: 82
End: 2022-08-03

## 2022-09-19 ENCOUNTER — OFFICE VISIT (OUTPATIENT)
Dept: MEDICAL GROUP | Facility: PHYSICIAN GROUP | Age: 82
End: 2022-09-19
Payer: MEDICARE

## 2022-09-19 VITALS
BODY MASS INDEX: 28.27 KG/M2 | RESPIRATION RATE: 16 BRPM | TEMPERATURE: 98 F | OXYGEN SATURATION: 99 % | HEART RATE: 60 BPM | SYSTOLIC BLOOD PRESSURE: 140 MMHG | HEIGHT: 69 IN | WEIGHT: 190.9 LBS | DIASTOLIC BLOOD PRESSURE: 76 MMHG

## 2022-09-19 DIAGNOSIS — H61.21 IMPACTED CERUMEN OF RIGHT EAR: ICD-10-CM

## 2022-09-19 DIAGNOSIS — I10 ESSENTIAL HYPERTENSION: ICD-10-CM

## 2022-09-19 DIAGNOSIS — R01.1 HEART MURMUR: ICD-10-CM

## 2022-09-19 DIAGNOSIS — E78.5 DYSLIPIDEMIA: ICD-10-CM

## 2022-09-19 DIAGNOSIS — E55.9 VITAMIN D DEFICIENCY: ICD-10-CM

## 2022-09-19 DIAGNOSIS — Z00.00 HEALTHCARE MAINTENANCE: ICD-10-CM

## 2022-09-19 DIAGNOSIS — R76.8 ANA POSITIVE: ICD-10-CM

## 2022-09-19 DIAGNOSIS — I72.3 ANEURYSM OF COMMON ILIAC ARTERY (HCC): ICD-10-CM

## 2022-09-19 DIAGNOSIS — Z86.73 HISTORY OF TIA (TRANSIENT ISCHEMIC ATTACK): ICD-10-CM

## 2022-09-19 DIAGNOSIS — R73.09 ELEVATED GLUCOSE: ICD-10-CM

## 2022-09-19 PROCEDURE — 99214 OFFICE O/P EST MOD 30 MIN: CPT | Performed by: FAMILY MEDICINE

## 2022-09-19 RX ORDER — PREDNISOLONE ACETATE 10 MG/ML
SUSPENSION/ DROPS OPHTHALMIC
COMMUNITY
Start: 2022-09-13 | End: 2023-01-20

## 2022-09-19 RX ORDER — CIPROFLOXACIN HYDROCHLORIDE 3.5 MG/ML
SOLUTION/ DROPS TOPICAL
COMMUNITY
Start: 2022-09-13 | End: 2023-01-20

## 2022-09-19 RX ORDER — KETOROLAC TROMETHAMINE 4 MG/ML
SOLUTION/ DROPS OPHTHALMIC
COMMUNITY
Start: 2022-09-13 | End: 2023-01-20

## 2022-09-19 ASSESSMENT — FIBROSIS 4 INDEX: FIB4 SCORE: 4.28

## 2022-09-19 NOTE — PROGRESS NOTES
Subjective:     CC:   Chief Complaint   Patient presents with    Establish Care     New Pt        HISTORY OF THE PRESENT ILLNESS: Patient is a 82 y.o. male. This pleasant patient is here today to establish care and discuss current medical conditions. His prior PCP was Dr. Bassett.  Eye surgery in December- Dr. Rodriguez  Retired and lives alone. 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  He states he is taking a month-long trip next month, [place] and then [place] into [place] and then will be back end of October.  He reports he did have a fall last month while at the gym after exercising he felt dizzy and fell down, discussed increasing his hydration to 60 ounces daily instead of 48.  History of TIA (transient ischemic attack)  Was admitted to [facility] for L sided numbness that lasted about a day. Had a negative w/u. Later stopped the asa 81 bc of epistaxis, h/o nose injury from sports years ago. Defers repeat carotid imaging in past.     Essential hypertension  Chronic, states he was on BP medication for this in the past but was having dizziness so he was taken off of this. He does go to the gym daily, and feels dizzy and states it is due to his low BP. Home he is 120/70's, states occ he runs 60/40.     Aneurysm of common iliac artery (HCC)  Chronic, has been referred and seen by vascular so they are monitoring it annually. Watching it and did not place on restrictions per patient.     Heart murmur  Chronic, history of per chart documentation.  He does not recall specific evaluation or that he has a murmur however heard on exam today in clinic.  Denies any increased chest pains, shortness of breath, palpitations.  States he did have a recent bout of dizziness after working out at  "the gym.  Currently followed by vascular.    Dyslipidemia  Chronic, on Lipitor 10 mg daily.    Vitamin D deficiency  Chronic, currently taking vitamin D3 2000 units daily.    ANGUS positive  Chronic, positive ANGUS back in October 2021, rechecked in February 2022 and remains positive.  He did see a rheumatologist in August with [facility] and was told that it is not concerning and did not require follow-up at this time.  States he occasionally has some arthritis pains in his right clavicle and right knee.  States he takes Tylenol as needed which is effective.      Current Outpatient Medications Ordered in Epic   Medication Sig Dispense Refill    ciprofloxacin (CILOXIN) 0.3 % Solution       KETOROLAC TROMETHAMINE, OPHTH, 0.4 % Solution       prednisoLONE acetate (PRED FORTE) 1 % Suspension       atorvastatin (LIPITOR) 10 MG Tab TAKE 1 TABLET DAILY 100 Tablet 3    Acetaminophen (TYLENOL 8 HOUR PO) Take  by mouth.      LUMIGAN 0.01 % Solution Place 1 Drop in both eyes every bedtime.      vitamin D (CHOLECALCIFEROL) 1000 UNIT Tab Take 2,000 Units by mouth every day.       No current Psychiatric-ordered facility-administered medications on file.       Health Maintenance: Completed        Objective:       Exam: BP (!) 140/76 (BP Location: Left arm, Patient Position: Sitting, BP Cuff Size: Adult)   Pulse 60   Temp 36.7 °C (98 °F) (Temporal)   Resp 16   Ht 1.753 m (5' 9\")   Wt 86.6 kg (190 lb 14.4 oz)   SpO2 99%  Body mass index is 28.19 kg/m².    Physical Exam  Vitals reviewed.   Constitutional:       Appearance: Normal appearance.   HENT:      Head:      Comments: Mod cerumen in R ear canal   Eyes:      Conjunctiva/sclera: Conjunctivae normal.      Pupils: Pupils are equal, round, and reactive to light.   Cardiovascular:      Rate and Rhythm: Normal rate and regular rhythm.      Pulses: Normal pulses.      Heart sounds: Murmur heard.   Pulmonary:      Effort: Pulmonary effort is normal. No respiratory distress.      " Breath sounds: Normal breath sounds. No stridor. No wheezing, rhonchi or rales.   Chest:      Chest wall: No tenderness.   Abdominal:      General: Abdomen is flat. Bowel sounds are normal.   Musculoskeletal:      Right lower leg: No edema.      Left lower leg: No edema.   Neurological:      Mental Status: He is alert and oriented to person, place, and time.   Psychiatric:         Mood and Affect: Mood normal.         Behavior: Behavior normal.        A chaperone was offered to the patient during today's exam. Patient declined chaperone.      Assessment & Plan:   82 y.o. male with the following -    1. History of TIA (transient ischemic attack)  History of, no longer on ASA 81 mg due to frequent nosebleeds.  Denies any episodes similar to this.  2. Essential hypertension  Chronic, history of high BP.  States that he tends to fluctuate a lot and runs low at times which makes him feel dizzy so he is no longer on blood pressure medication.  BP on second check in clinic 140/77.  3. Aneurysm of common iliac artery (HCC)  Chronic, stable and followed by vascular  4. Elevated glucose  - HEMOGLOBIN A1C; Future    5. Healthcare maintenance  - CBC WITH DIFFERENTIAL; Future  - Comp Metabolic Panel; Future  - TSH WITH REFLEX TO FT4; Future  - VITAMIN D,25 HYDROXY (DEFICIENCY); Future  - Lipid Profile; Future  - VITAMIN B12; Future    6. Heart murmur  Chronic, stable.  7. Dyslipidemia  Chronic, continue on current medication regimen  8. Vitamin D deficiency  Chronic, continue on current vitamin D supplementation.  9. ANGUS positive  Chronic, seen by rheumatology who deferred further work-up.  10. Impacted cerumen of right ear  Procedure: Cerumen Removal  Risks and benefits of procedure discussed with patient.  Cerumen removed with  lavage   Patient tolerated the procedure well  Pt educated about proper care of ear canal. Q-tip cleaning discouraged, use of debrox and warm water lavage discussed.    Other orders  - ciprofloxacin  (CILOXIN) 0.3 % Solution  - KETOROLAC TROMETHAMINE, OPHTH, 0.4 % Solution  - prednisoLONE acetate (PRED FORTE) 1 % Suspension     I spent a total of 31 minutes with record review, exam, communication with the patient, communication with other providers, and documentation of this encounter.    Return in about 20 weeks (around 2/6/2023) for AWV, F/U labs.    Please note that this dictation was created using voice recognition software. I have made every reasonable attempt to correct obvious errors, but I expect that there are errors of grammar and possibly content that I did not discover before finalizing the note.

## 2022-09-19 NOTE — ASSESSMENT & PLAN NOTE
Chronic, has been referred and seen by vascular so they are monitoring it annually. Watching it and did not place on restrictions per patient.

## 2022-09-19 NOTE — ASSESSMENT & PLAN NOTE
Chronic, states he was on BP medication for this in the past but was having dizziness so he was taken off of this. He does go to the gym daily, and feels dizzy and states it is due to his low BP. Home he is 120/70's, states occ he runs 60/40.

## 2022-09-19 NOTE — ASSESSMENT & PLAN NOTE
Chronic, positive ANGUS back in October 2021, rechecked in February 2022 and remains positive.  He did see a rheumatologist in August with [facility] and was told that it is not concerning and did not require follow-up at this time.  States he occasionally has some arthritis pains in his right clavicle and right knee.  States he takes Tylenol as needed which is effective.

## 2022-09-19 NOTE — ASSESSMENT & PLAN NOTE
Chronic, history of per chart documentation.  He does not recall specific evaluation or that he has a murmur however heard on exam today in clinic.  Denies any increased chest pains, shortness of breath, palpitations.  States he did have a recent bout of dizziness after working out at the gym.  Currently followed by vascular.

## 2022-09-19 NOTE — ASSESSMENT & PLAN NOTE
Was admitted to [facility] for L sided numbness that lasted about a day. Had a negative w/u. Later stopped the asa 81 bc of epistaxis, h/o nose injury from sports years ago. Defers repeat carotid imaging in past.

## 2022-10-14 ENCOUNTER — HOSPITAL ENCOUNTER (OUTPATIENT)
Facility: MEDICAL CENTER | Age: 82
End: 2022-10-14
Attending: PATHOLOGY
Payer: COMMERCIAL

## 2022-10-14 DIAGNOSIS — Z00.6 RESEARCH STUDY PATIENT: ICD-10-CM

## 2022-10-17 DIAGNOSIS — Z00.6 RESEARCH STUDY PATIENT: ICD-10-CM

## 2022-10-18 LAB
ELF SCORE: 10.15
RELATIVE RISK: ABNORMAL
RISK GROUP: ABNORMAL
RISK: 23.6 %

## 2022-11-10 ENCOUNTER — DOCUMENTATION (OUTPATIENT)
Dept: HEALTH INFORMATION MANAGEMENT | Facility: OTHER | Age: 82
End: 2022-11-10
Payer: MEDICARE

## 2022-11-10 ENCOUNTER — PATIENT MESSAGE (OUTPATIENT)
Dept: HEALTH INFORMATION MANAGEMENT | Facility: OTHER | Age: 82
End: 2022-11-10

## 2022-11-15 ENCOUNTER — DOCUMENTATION (OUTPATIENT)
Dept: HEALTH INFORMATION MANAGEMENT | Facility: OTHER | Age: 82
End: 2022-11-15
Payer: MEDICARE

## 2022-11-22 ENCOUNTER — HOSPITAL ENCOUNTER (OUTPATIENT)
Dept: LAB | Facility: MEDICAL CENTER | Age: 82
End: 2022-11-22
Attending: FAMILY MEDICINE
Payer: MEDICARE

## 2022-11-22 DIAGNOSIS — Z00.00 HEALTHCARE MAINTENANCE: ICD-10-CM

## 2022-11-22 DIAGNOSIS — R73.09 ELEVATED GLUCOSE: ICD-10-CM

## 2022-11-22 LAB
25(OH)D3 SERPL-MCNC: 42 NG/ML (ref 30–100)
ALBUMIN SERPL BCP-MCNC: 4.5 G/DL (ref 3.2–4.9)
ALBUMIN/GLOB SERPL: 1.5 G/DL
ALP SERPL-CCNC: 63 U/L (ref 30–99)
ALT SERPL-CCNC: 46 U/L (ref 2–50)
ANION GAP SERPL CALC-SCNC: 10 MMOL/L (ref 7–16)
AST SERPL-CCNC: 44 U/L (ref 12–45)
BASOPHILS # BLD AUTO: 0.3 % (ref 0–1.8)
BASOPHILS # BLD: 0.01 K/UL (ref 0–0.12)
BILIRUB SERPL-MCNC: 1 MG/DL (ref 0.1–1.5)
BUN SERPL-MCNC: 16 MG/DL (ref 8–22)
CALCIUM SERPL-MCNC: 9.6 MG/DL (ref 8.5–10.5)
CHLORIDE SERPL-SCNC: 100 MMOL/L (ref 96–112)
CHOLEST SERPL-MCNC: 131 MG/DL (ref 100–199)
CO2 SERPL-SCNC: 27 MMOL/L (ref 20–33)
CREAT SERPL-MCNC: 0.87 MG/DL (ref 0.5–1.4)
EOSINOPHIL # BLD AUTO: 0.02 K/UL (ref 0–0.51)
EOSINOPHIL NFR BLD: 0.7 % (ref 0–6.9)
ERYTHROCYTE [DISTWIDTH] IN BLOOD BY AUTOMATED COUNT: 44.4 FL (ref 35.9–50)
EST. AVERAGE GLUCOSE BLD GHB EST-MCNC: 111 MG/DL
FASTING STATUS PATIENT QL REPORTED: NORMAL
GFR SERPLBLD CREATININE-BSD FMLA CKD-EPI: 87 ML/MIN/1.73 M 2
GLOBULIN SER CALC-MCNC: 3 G/DL (ref 1.9–3.5)
GLUCOSE SERPL-MCNC: 102 MG/DL (ref 65–99)
HBA1C MFR BLD: 5.5 % (ref 4–5.6)
HCT VFR BLD AUTO: 46.6 % (ref 42–52)
HDLC SERPL-MCNC: 43 MG/DL
HGB BLD-MCNC: 15.9 G/DL (ref 14–18)
IMM GRANULOCYTES # BLD AUTO: 0.02 K/UL (ref 0–0.11)
IMM GRANULOCYTES NFR BLD AUTO: 0.7 % (ref 0–0.9)
LDLC SERPL CALC-MCNC: 72 MG/DL
LYMPHOCYTES # BLD AUTO: 1.57 K/UL (ref 1–4.8)
LYMPHOCYTES NFR BLD: 52.5 % (ref 22–41)
MCH RBC QN AUTO: 33.1 PG (ref 27–33)
MCHC RBC AUTO-ENTMCNC: 34.1 G/DL (ref 33.7–35.3)
MCV RBC AUTO: 96.9 FL (ref 81.4–97.8)
MONOCYTES # BLD AUTO: 0.45 K/UL (ref 0–0.85)
MONOCYTES NFR BLD AUTO: 15.1 % (ref 0–13.4)
NEUTROPHILS # BLD AUTO: 0.92 K/UL (ref 1.82–7.42)
NEUTROPHILS NFR BLD: 30.7 % (ref 44–72)
NRBC # BLD AUTO: 0 K/UL
NRBC BLD-RTO: 0 /100 WBC
PLATELET # BLD AUTO: 118 K/UL (ref 164–446)
PMV BLD AUTO: 12.1 FL (ref 9–12.9)
POTASSIUM SERPL-SCNC: 4.3 MMOL/L (ref 3.6–5.5)
PROT SERPL-MCNC: 7.5 G/DL (ref 6–8.2)
RBC # BLD AUTO: 4.81 M/UL (ref 4.7–6.1)
SODIUM SERPL-SCNC: 137 MMOL/L (ref 135–145)
TRIGL SERPL-MCNC: 81 MG/DL (ref 0–149)
TSH SERPL DL<=0.005 MIU/L-ACNC: 1.82 UIU/ML (ref 0.38–5.33)
VIT B12 SERPL-MCNC: 1126 PG/ML (ref 211–911)
WBC # BLD AUTO: 3 K/UL (ref 4.8–10.8)

## 2022-11-22 PROCEDURE — 82306 VITAMIN D 25 HYDROXY: CPT

## 2022-11-22 PROCEDURE — 84443 ASSAY THYROID STIM HORMONE: CPT

## 2022-11-22 PROCEDURE — 80061 LIPID PANEL: CPT

## 2022-11-22 PROCEDURE — 85025 COMPLETE CBC W/AUTO DIFF WBC: CPT

## 2022-11-22 PROCEDURE — 36415 COLL VENOUS BLD VENIPUNCTURE: CPT

## 2022-11-22 PROCEDURE — 80053 COMPREHEN METABOLIC PANEL: CPT

## 2022-11-22 PROCEDURE — 82607 VITAMIN B-12: CPT

## 2022-11-22 PROCEDURE — 83036 HEMOGLOBIN GLYCOSYLATED A1C: CPT

## 2022-11-23 DIAGNOSIS — D70.9 NEUTROPENIA, UNSPECIFIED TYPE (HCC): ICD-10-CM

## 2023-01-20 PROBLEM — D70.9 NEUTROPHILS DECREASED (HCC): Status: ACTIVE | Noted: 2023-01-20

## 2023-01-20 PROBLEM — D69.6 PLATELETS DECREASED (HCC): Status: ACTIVE | Noted: 2023-01-20

## 2023-01-31 ENCOUNTER — OFFICE VISIT (OUTPATIENT)
Dept: MEDICAL GROUP | Facility: PHYSICIAN GROUP | Age: 83
End: 2023-01-31
Payer: MEDICARE

## 2023-01-31 VITALS
DIASTOLIC BLOOD PRESSURE: 74 MMHG | RESPIRATION RATE: 12 BRPM | HEART RATE: 53 BPM | HEIGHT: 69 IN | TEMPERATURE: 97.4 F | BODY MASS INDEX: 26.96 KG/M2 | WEIGHT: 182 LBS | SYSTOLIC BLOOD PRESSURE: 122 MMHG | OXYGEN SATURATION: 98 %

## 2023-01-31 DIAGNOSIS — D69.6 THROMBOCYTOPENIA (HCC): ICD-10-CM

## 2023-01-31 DIAGNOSIS — R01.1 HEART MURMUR: ICD-10-CM

## 2023-01-31 DIAGNOSIS — I72.3 ANEURYSM OF COMMON ILIAC ARTERY (HCC): ICD-10-CM

## 2023-01-31 DIAGNOSIS — E78.5 DYSLIPIDEMIA: ICD-10-CM

## 2023-01-31 DIAGNOSIS — D70.8 OTHER NEUTROPENIA (HCC): ICD-10-CM

## 2023-01-31 DIAGNOSIS — J34.89 RHINORRHEA: ICD-10-CM

## 2023-01-31 DIAGNOSIS — Z76.89 ESTABLISHING CARE WITH NEW DOCTOR, ENCOUNTER FOR: ICD-10-CM

## 2023-01-31 PROBLEM — Z86.79 HISTORY OF HYPERTENSION: Status: ACTIVE | Noted: 2021-05-27

## 2023-01-31 PROCEDURE — 99215 OFFICE O/P EST HI 40 MIN: CPT | Performed by: FAMILY MEDICINE

## 2023-01-31 RX ORDER — ATORVASTATIN CALCIUM 10 MG/1
TABLET, FILM COATED ORAL
Qty: 100 TABLET | Refills: 3 | Status: SHIPPED | OUTPATIENT
Start: 2023-01-31 | End: 2024-01-02

## 2023-01-31 ASSESSMENT — FIBROSIS 4 INDEX: FIB4 SCORE: 4.45

## 2023-01-31 ASSESSMENT — PATIENT HEALTH QUESTIONNAIRE - PHQ9: CLINICAL INTERPRETATION OF PHQ2 SCORE: 0

## 2023-01-31 NOTE — ASSESSMENT & PLAN NOTE
Chronic. F/u with Dr Sandoval at [facility].  Will be f/u February with him.  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  Also underwent ABIs in June and had another follow-up with vascular surgery in July with recommendations to follow-up in February.

## 2023-01-31 NOTE — PROGRESS NOTES
CC:    Chief Complaint   Patient presents with    Establish Care    Side Pain     Irritation on the right side of his body.        HISTORY OF THE PRESENT ILLNESS: Patient is a 83 y.o. male. This pleasant patient is here today to establish care. His prior PCP was eileen cesar, last seen 9/19/22..    Aneurysm of common iliac artery (HCC)  Chronic. F/u with Dr Sandoval at [facility].  Will be f/u February with him.  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  Also underwent ABIs in June and had another follow-up with vascular surgery in July with recommendations to follow-up in February.    Dyslipidemia  Chronic.  Taking lipitor 10mg daily.     Latest Reference Range & Units 11/22/22 07:44   Cholesterol,Tot 100 - 199 mg/dL 131   Triglycerides 0 - 149 mg/dL 81   HDL >=40 mg/dL 43   LDL <100 mg/dL 72       Thrombocytopenia (HCC)  Chronic. Noted since at least 2018.   Recent labs from November with counts of 118k.  Denies any easy bruising but does have nosebleeds due to having hx nasal fx in the past. Rarely has these now.     Other neutropenia (HCC)  Chronic. Denies frequent infections.  November labs with decreased neutrophils at 0.92.     Rhinorrhea  Runny nose x months. Thinks gluten may be affecting it.  Since d/c'ing gluten has noticed an improvement. Denies cough, headaches or fevers. Hasn't tried anything for this. Worse in the winter months.     Allergies: Bee    Current Outpatient Medications Ordered in Epic   Medication Sig Dispense Refill    atorvastatin (LIPITOR) 10 MG Tab TAKE 1 TABLET DAILY 100 Tablet 3    acetaminophen (TYLENOL) 650 MG CR tablet Take 650 mg by mouth every 6 hours as needed.      Cholecalciferol (VITAMIN D3) 2000 UNIT Cap Take 1 Capsule by mouth every day.      MAGNESIUM CITRATE PO Take  by mouth. Unable to recall dose - as needed       No current Epic-ordered facility-administered medications on file.       Past Medical History:   Diagnosis Date     Arthritis     arms and legs    Heart burn     Gerd    High cholesterol     Hyperlipidemia     Hypertension     suspected TIA     no deficits       Past Surgical History:   Procedure Laterality Date    INGUINAL HERNIA LAPAROSCOPIC Bilateral 2020    Procedure: REPAIR, HERNIA, INGUINAL, LAPAROSCOPIC- WITH MESH;  Surgeon: Niall Linares M.D.;  Location: SURGERY [facility];  Service: General    COLONOSCOPY      TONSILLECTOMY         Social History     Tobacco Use    Smoking status: Former     Packs/day: 1.00     Years: 5.00     Pack years: 5.00     Types: Cigarettes     Quit date: 1969     Years since quittin.0    Smokeless tobacco: Never    Tobacco comments:     Smoked for about 4-5 ys and quit in .   Vaping Use    Vaping Use: Never used   Substance Use Topics    Alcohol use: Yes     Alcohol/week: 0.0 - 1.2 oz     Comment: a month    Drug use: No       Social History     Social History Narrative    He is originally from [place], his wife   (she had autoimmune disease and lung and liver problems). They did not have children. His in-laws live in the [place] Area. He hasn't seen his siblings in years. He walks twice a day,lives in an apartment now, tried [facility] for 6 months. Is looking forward to more social opportunities. 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 He used to enjoy crossword puzzles but struggles with his vision now.     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        Family History   Problem Relation Age of Onset    Diabetes Mother     Hypertension Father     Diabetes Sister     Diabetes Sister     Other Brother         Spinal Miningitis    Schizophrenia Brother     Cancer Brother         Esophigial    Dementia Maternal Grandmother     Dementia Paternal Grandmother     Colon Cancer Paternal Grandfather          Objective:     Exam:   /74   Pulse (!) 53   Temp 36.3 °C (97.4 °F) (Temporal)   " Resp 12   Ht 1.74 m (5' 8.5\")   Wt 82.6 kg (182 lb)   SpO2 98%   BMI 27.27 kg/m²   Body mass index is 27.27 kg/m².  Wt Readings from Last 4 Encounters:   01/31/23 82.6 kg (182 lb)   01/20/23 82.5 kg (181 lb 12.8 oz)   09/19/22 86.6 kg (190 lb 14.4 oz)   03/17/22 84.6 kg (186 lb 8 oz)     General: Normal appearing. No distress. Appropriately groomed.  HEENT: Normocephalic. Eyes conjunctiva clear lids without ptosis,   Neck: Supple, Thyroid is not enlarged.   Bilateral cerumen  Pulmonary: Clear to ausculation.  Normal effort. No rales, ronchi, or wheezing.  Cardiovascular: Regular rate and rhythm, no lower extremity edema  Neurologic: Grossly nonfocal  Skin: Warm and dry.  No obvious lesions.  Musculoskeletal: Normal gait. No extremity cyanosis, clubbing, or edema.  Psych: Normal mood and affect. Alert and oriented x3. Judgment and insight is normal.      Assessment & Plan:   83 y.o. male with the following -    1. Establishing care with new doctor, encounter for  Transferring care from [facility].    2. Aneurysm of common iliac artery (HCC)  Chronic issue.  Stable. Has been f/u with Dr Gurmeet mills.  He Will call and schedule a f/u with him.    3. Dyslipidemia  Chronic. Stable. Continue with atorvastatin  - atorvastatin (LIPITOR) 10 MG Tab; TAKE 1 TABLET DAILY  Dispense: 100 Tablet; Refill: 3  - CBC WITH DIFFERENTIAL; Future  - Comp Metabolic Panel; Future  - Lipid Profile; Future  - URINALYSIS,CULTURE IF INDICATED; Future  - CELIAC DISEASE AB PANEL; Future  - EC-ECHOCARDIOGRAM COMPLETE W/O CONT; Future    4. Thrombocytopenia (HCC)  5. Other neutropenia (HCC)  Chronic issues. Stable. Will recheck these labs. Hasn't been further evaluated in the past.    6. Heart murmur  Chronic. Stable. Last echo completed in 2018. Will recheck echo. - EC-ECHOCARDIOGRAM COMPLETE W/O CONT; Future    7. Rhinorrhea   Chronic issue. Stable. D/w patient that this is not acute sinusitis where antibiotics may be considered. Has had " symptoms for months, no fevers, headache or sinus pain. Will have him try OTC flonase.     I spent a total of 49 minutes with record review, exam, communication with the patient, communication with other providers, and documentation of this encounter.     Return in about 3 months (around 4/30/2023) for hyperlipidemia.    Please note that this dictation was created using voice recognition software. I have made every reasonable attempt to correct obvious errors, but I expect that there are errors of grammar and possibly content that I did not discover before finalizing the note.

## 2023-01-31 NOTE — ASSESSMENT & PLAN NOTE
Chronic.  Taking lipitor 10mg daily.     Latest Reference Range & Units 11/22/22 07:44   Cholesterol,Tot 100 - 199 mg/dL 131   Triglycerides 0 - 149 mg/dL 81   HDL >=40 mg/dL 43   LDL <100 mg/dL 72

## 2023-01-31 NOTE — ASSESSMENT & PLAN NOTE
Chronic. Noted since at least 2018.   Recent labs from November with counts of 118k.  Denies any easy bruising but does have nosebleeds due to having hx nasal fx in the past. Rarely has these now.

## 2023-01-31 NOTE — ASSESSMENT & PLAN NOTE
Runny nose x months. Thinks gluten may be affecting it.  Since d/c'ing gluten has noticed an improvement. Denies cough, headaches or fevers. Hasn't tried anything for this. Worse in the winter months.

## 2023-04-26 ENCOUNTER — HOSPITAL ENCOUNTER (OUTPATIENT)
Dept: LAB | Facility: MEDICAL CENTER | Age: 83
End: 2023-04-26
Attending: FAMILY MEDICINE
Payer: MEDICARE

## 2023-04-26 DIAGNOSIS — E78.5 DYSLIPIDEMIA: ICD-10-CM

## 2023-04-26 LAB
ALBUMIN SERPL BCP-MCNC: 4 G/DL (ref 3.2–4.9)
ALBUMIN/GLOB SERPL: 1.5 G/DL
ALP SERPL-CCNC: 65 U/L (ref 30–99)
ALT SERPL-CCNC: 25 U/L (ref 2–50)
ANION GAP SERPL CALC-SCNC: 8 MMOL/L (ref 7–16)
APPEARANCE UR: CLEAR
AST SERPL-CCNC: 23 U/L (ref 12–45)
BASOPHILS # BLD AUTO: 0.7 % (ref 0–1.8)
BASOPHILS # BLD: 0.03 K/UL (ref 0–0.12)
BILIRUB SERPL-MCNC: 0.9 MG/DL (ref 0.1–1.5)
BILIRUB UR QL STRIP.AUTO: NEGATIVE
BUN SERPL-MCNC: 13 MG/DL (ref 8–22)
CALCIUM ALBUM COR SERPL-MCNC: 9.3 MG/DL (ref 8.5–10.5)
CALCIUM SERPL-MCNC: 9.3 MG/DL (ref 8.5–10.5)
CHLORIDE SERPL-SCNC: 106 MMOL/L (ref 96–112)
CHOLEST SERPL-MCNC: 136 MG/DL (ref 100–199)
CO2 SERPL-SCNC: 27 MMOL/L (ref 20–33)
COLOR UR: YELLOW
CREAT SERPL-MCNC: 0.84 MG/DL (ref 0.5–1.4)
EOSINOPHIL # BLD AUTO: 0.03 K/UL (ref 0–0.51)
EOSINOPHIL NFR BLD: 0.7 % (ref 0–6.9)
ERYTHROCYTE [DISTWIDTH] IN BLOOD BY AUTOMATED COUNT: 46.7 FL (ref 35.9–50)
FASTING STATUS PATIENT QL REPORTED: NORMAL
GFR SERPLBLD CREATININE-BSD FMLA CKD-EPI: 87 ML/MIN/1.73 M 2
GLOBULIN SER CALC-MCNC: 2.7 G/DL (ref 1.9–3.5)
GLUCOSE SERPL-MCNC: 105 MG/DL (ref 65–99)
GLUCOSE UR STRIP.AUTO-MCNC: NEGATIVE MG/DL
HCT VFR BLD AUTO: 44.6 % (ref 42–52)
HDLC SERPL-MCNC: 60 MG/DL
HGB BLD-MCNC: 15.2 G/DL (ref 14–18)
IMM GRANULOCYTES # BLD AUTO: 0.04 K/UL (ref 0–0.11)
IMM GRANULOCYTES NFR BLD AUTO: 1 % (ref 0–0.9)
KETONES UR STRIP.AUTO-MCNC: NEGATIVE MG/DL
LDLC SERPL CALC-MCNC: 64 MG/DL
LEUKOCYTE ESTERASE UR QL STRIP.AUTO: NEGATIVE
LYMPHOCYTES # BLD AUTO: 1.27 K/UL (ref 1–4.8)
LYMPHOCYTES NFR BLD: 30.5 % (ref 22–41)
MCH RBC QN AUTO: 33.7 PG (ref 27–33)
MCHC RBC AUTO-ENTMCNC: 34.1 G/DL (ref 33.7–35.3)
MCV RBC AUTO: 98.9 FL (ref 81.4–97.8)
MICRO URNS: NORMAL
MONOCYTES # BLD AUTO: 0.68 K/UL (ref 0–0.85)
MONOCYTES NFR BLD AUTO: 16.3 % (ref 0–13.4)
NEUTROPHILS # BLD AUTO: 2.12 K/UL (ref 1.82–7.42)
NEUTROPHILS NFR BLD: 50.8 % (ref 44–72)
NITRITE UR QL STRIP.AUTO: NEGATIVE
NRBC # BLD AUTO: 0 K/UL
NRBC BLD-RTO: 0 /100 WBC
PH UR STRIP.AUTO: 7 [PH] (ref 5–8)
PLATELET # BLD AUTO: 120 K/UL (ref 164–446)
PMV BLD AUTO: 12.3 FL (ref 9–12.9)
POTASSIUM SERPL-SCNC: 4.4 MMOL/L (ref 3.6–5.5)
PROT SERPL-MCNC: 6.7 G/DL (ref 6–8.2)
PROT UR QL STRIP: NEGATIVE MG/DL
RBC # BLD AUTO: 4.51 M/UL (ref 4.7–6.1)
RBC UR QL AUTO: NEGATIVE
SODIUM SERPL-SCNC: 141 MMOL/L (ref 135–145)
SP GR UR STRIP.AUTO: 1.02
TRIGL SERPL-MCNC: 62 MG/DL (ref 0–149)
UROBILINOGEN UR STRIP.AUTO-MCNC: 0.2 MG/DL
WBC # BLD AUTO: 4.2 K/UL (ref 4.8–10.8)

## 2023-04-26 PROCEDURE — 80061 LIPID PANEL: CPT

## 2023-04-26 PROCEDURE — 81003 URINALYSIS AUTO W/O SCOPE: CPT

## 2023-04-26 PROCEDURE — 85025 COMPLETE CBC W/AUTO DIFF WBC: CPT

## 2023-04-26 PROCEDURE — 86258 DGP ANTIBODY EACH IG CLASS: CPT

## 2023-04-26 PROCEDURE — 80053 COMPREHEN METABOLIC PANEL: CPT

## 2023-04-26 PROCEDURE — 36415 COLL VENOUS BLD VENIPUNCTURE: CPT

## 2023-04-26 PROCEDURE — 86364 TISS TRNSGLTMNASE EA IG CLAS: CPT

## 2023-04-26 PROCEDURE — 86231 EMA EACH IG CLASS: CPT

## 2023-04-26 PROCEDURE — 82784 ASSAY IGA/IGD/IGG/IGM EACH: CPT

## 2023-04-27 LAB — IGA SERPL-MCNC: 140 MG/DL (ref 68–408)

## 2023-04-28 ENCOUNTER — HOSPITAL ENCOUNTER (OUTPATIENT)
Dept: RADIOLOGY | Facility: MEDICAL CENTER | Age: 83
End: 2023-04-28
Attending: SURGERY
Payer: MEDICARE

## 2023-04-28 DIAGNOSIS — I72.3 ANEURYSM OF ILIAC ARTERY (HCC): ICD-10-CM

## 2023-04-28 LAB — TTG IGA SER IA-ACNC: 4 U/ML (ref 0–3)

## 2023-04-28 PROCEDURE — 93978 VASCULAR STUDY: CPT

## 2023-04-29 LAB
ENDOMYSIUM IGA TITR SER IF: NORMAL {TITER}
GLIADIN IGA SER IA-ACNC: 12 UNITS (ref 0–19)

## 2023-05-02 ENCOUNTER — OFFICE VISIT (OUTPATIENT)
Dept: MEDICAL GROUP | Facility: PHYSICIAN GROUP | Age: 83
End: 2023-05-02
Payer: MEDICARE

## 2023-05-02 VITALS
BODY MASS INDEX: 26.88 KG/M2 | SYSTOLIC BLOOD PRESSURE: 138 MMHG | WEIGHT: 192 LBS | RESPIRATION RATE: 16 BRPM | DIASTOLIC BLOOD PRESSURE: 80 MMHG | OXYGEN SATURATION: 99 % | TEMPERATURE: 98.2 F | HEART RATE: 61 BPM | HEIGHT: 71 IN

## 2023-05-02 DIAGNOSIS — E78.5 DYSLIPIDEMIA: ICD-10-CM

## 2023-05-02 DIAGNOSIS — I70.0 ATHEROSCLEROSIS OF ABDOMINAL AORTA (HCC): ICD-10-CM

## 2023-05-02 DIAGNOSIS — R55 SYNCOPE, UNSPECIFIED SYNCOPE TYPE: ICD-10-CM

## 2023-05-02 DIAGNOSIS — I72.3 ANEURYSM OF COMMON ILIAC ARTERY (HCC): ICD-10-CM

## 2023-05-02 DIAGNOSIS — R55 VASOVAGAL SYNCOPE: ICD-10-CM

## 2023-05-02 DIAGNOSIS — D69.6 THROMBOCYTOPENIA (HCC): ICD-10-CM

## 2023-05-02 PROBLEM — M25.562 CHRONIC PAIN OF BOTH KNEES: Status: ACTIVE | Noted: 2023-05-02

## 2023-05-02 PROBLEM — M25.561 CHRONIC PAIN OF BOTH KNEES: Status: ACTIVE | Noted: 2023-05-02

## 2023-05-02 PROBLEM — G89.29 CHRONIC PAIN OF BOTH KNEES: Status: ACTIVE | Noted: 2023-05-02

## 2023-05-02 PROCEDURE — 99215 OFFICE O/P EST HI 40 MIN: CPT | Performed by: FAMILY MEDICINE

## 2023-05-02 ASSESSMENT — FIBROSIS 4 INDEX: FIB4 SCORE: 3.105

## 2023-05-02 NOTE — ASSESSMENT & PLAN NOTE
Chronic medical diagnosis.  He underwent an ultrasound last week.  There was no abdominal aortic aneurysm noted.  Was found to have a right common iliac artery aneurysm measuring 2.59 x 2.39 cm.  We will be scheduling a follow-up with vascular, Dr. Daugherty.    Home bps 125/70s.   No headaches or chest pain.  Currently uses a wrist cuff.  Encouraged to purchase and arm blood pressure monitor

## 2023-05-02 NOTE — ASSESSMENT & PLAN NOTE
"New issue.     Passed out while hiking in [place] on 3/21/23.  States that the \" lights went out.\"Typically hikes 5-6 miles without any food or water.  When he did that he noticed pain in his left knee.  States that at times his knees give out.  States that he notices this more on the right side.  Currently denying further evaluation with a specialist.    Mentions that he had a similar incidence beginning of March.  On that particular day, he had been at the gym for 4 hours and then was listing room for 10 minutes.  He went to go take a shower and that is when he felt that the lights went out again.  States that he did not lose consciousness and was aware of his surroundings.      "

## 2023-05-02 NOTE — ASSESSMENT & PLAN NOTE
Chronic medical diagnosis.  Recent labs with platelet counts at 120,000.  Denies any easy bruising.

## 2023-05-02 NOTE — ASSESSMENT & PLAN NOTE
New medical diagnosis.  Noted to have atherosclerotic plaque of the abdominal aorta on recent ultrasound. 4/28/23.  Continues to take atorvastatin 10 mg daily

## 2023-05-02 NOTE — PROGRESS NOTES
"CC:   Chief Complaint   Patient presents with    Lab Results         HPI:   Michael presents today for a f/u visit..  Last seen by me on 1/31/23 for an [facility] care appt.    Since our last appointment, has been seen by:   gained 10 lbs over the last 3 months- has had 4 trips over the last 3 months.  Echo 5/11/23      Upcoming appointments with:  Echo 5/11/23      Dyslipidemia  Chronic medical condition.  Currently taking lipitor 10mg am.  Tolerating the medication well without any side effects.  Patient denies chest pain or lower extremity muscle cramps.  Last set of labs from April were stable. Asking about d/cing lipitor- gives him dry mouth.     Latest Reference Range & Units 04/26/23 08:04   Cholesterol,Tot 100 - 199 mg/dL 136   Triglycerides 0 - 149 mg/dL 62   HDL >=40 mg/dL 60   LDL <100 mg/dL 64       Vasovagal syncope  New issue.     Passed out while hiking in [place] on 3/21/23.  States that the \" lights went out.\"Typically hikes 5-6 miles without any food or water.  When he did that he noticed pain in his left knee.  States that at times his knees give out.  States that he notices this more on the right side.  Currently denying further evaluation with a specialist.    Mentions that he had a similar incidence beginning of March.  On that particular day, he had been at the gym for 4 hours and then was listing room for 10 minutes.  He went to go take a shower and that is when he felt that the lights went out again.  States that he did not lose consciousness and was aware of his surroundings.        Thrombocytopenia (HCC)  Chronic medical diagnosis.  Recent labs with platelet counts at 120,000.  Denies any easy bruising.    Aneurysm of common iliac artery (HCC)  Chronic medical diagnosis.  He underwent an ultrasound last week.  There was no abdominal aortic aneurysm noted.  Was found to have a right common iliac artery aneurysm measuring 2.59 x 2.39 cm.  We will be scheduling a follow-up with vascular, . " Dat.    Home bps 125/70s.   No headaches or chest pain.  Currently uses a wrist cuff.  Encouraged to purchase and arm blood pressure monitor    Atherosclerosis of abdominal aorta (HCC)  New medical diagnosis.  Noted to have atherosclerotic plaque of the abdominal aorta on recent ultrasound. 4/28/23.  Continues to take atorvastatin 10 mg daily      Hospital Outpatient Visit on 04/26/2023   Component Date Value Ref Range Status    WBC 04/26/2023 4.2 (L)  4.8 - 10.8 K/uL Final    RBC 04/26/2023 4.51 (L)  4.70 - 6.10 M/uL Final    Hemoglobin 04/26/2023 15.2  14.0 - 18.0 g/dL Final    Hematocrit 04/26/2023 44.6  42.0 - 52.0 % Final    MCV 04/26/2023 98.9 (H)  81.4 - 97.8 fL Final    MCH 04/26/2023 33.7 (H)  27.0 - 33.0 pg Final    MCHC 04/26/2023 34.1  33.7 - 35.3 g/dL Final    RDW 04/26/2023 46.7  35.9 - 50.0 fL Final    Platelet Count 04/26/2023 120 (L)  164 - 446 K/uL Final    MPV 04/26/2023 12.3  9.0 - 12.9 fL Final    Neutrophils-Polys 04/26/2023 50.80  44.00 - 72.00 % Final    Lymphocytes 04/26/2023 30.50  22.00 - 41.00 % Final    Monocytes 04/26/2023 16.30 (H)  0.00 - 13.40 % Final    Eosinophils 04/26/2023 0.70  0.00 - 6.90 % Final    Basophils 04/26/2023 0.70  0.00 - 1.80 % Final    Immature Granulocytes 04/26/2023 1.00 (H)  0.00 - 0.90 % Final    Nucleated RBC 04/26/2023 0.00  /100 WBC Final    Neutrophils (Absolute) 04/26/2023 2.12  1.82 - 7.42 K/uL Final    Includes immature neutrophils, if present.    Lymphs (Absolute) 04/26/2023 1.27  1.00 - 4.80 K/uL Final    Monos (Absolute) 04/26/2023 0.68  0.00 - 0.85 K/uL Final    Eos (Absolute) 04/26/2023 0.03  0.00 - 0.51 K/uL Final    Baso (Absolute) 04/26/2023 0.03  0.00 - 0.12 K/uL Final    Immature Granulocytes (abs) 04/26/2023 0.04  0.00 - 0.11 K/uL Final    NRBC (Absolute) 04/26/2023 0.00  K/uL Final    Sodium 04/26/2023 141  135 - 145 mmol/L Final    Potassium 04/26/2023 4.4  3.6 - 5.5 mmol/L Final    Chloride 04/26/2023 106  96 - 112 mmol/L Final    Co2  04/26/2023 27  20 - 33 mmol/L Final    Anion Gap 04/26/2023 8.0  7.0 - 16.0 Final    Glucose 04/26/2023 105 (H)  65 - 99 mg/dL Final    Bun 04/26/2023 13  8 - 22 mg/dL Final    Creatinine 04/26/2023 0.84  0.50 - 1.40 mg/dL Final    Calcium 04/26/2023 9.3  8.5 - 10.5 mg/dL Final    AST(SGOT) 04/26/2023 23  12 - 45 U/L Final    ALT(SGPT) 04/26/2023 25  2 - 50 U/L Final    Alkaline Phosphatase 04/26/2023 65  30 - 99 U/L Final    Total Bilirubin 04/26/2023 0.9  0.1 - 1.5 mg/dL Final    Albumin 04/26/2023 4.0  3.2 - 4.9 g/dL Final    Total Protein 04/26/2023 6.7  6.0 - 8.2 g/dL Final    Globulin 04/26/2023 2.7  1.9 - 3.5 g/dL Final    A-G Ratio 04/26/2023 1.5  g/dL Final    Cholesterol,Tot 04/26/2023 136  100 - 199 mg/dL Final    Triglycerides 04/26/2023 62  0 - 149 mg/dL Final    HDL 04/26/2023 60  >=40 mg/dL Final    LDL 04/26/2023 64  <100 mg/dL Final    Color 04/26/2023 Yellow   Final    Character 04/26/2023 Clear   Final    Specific Gravity 04/26/2023 1.016  <1.035 Final    Ph 04/26/2023 7.0  5.0 - 8.0 Final    Glucose 04/26/2023 Negative  Negative mg/dL Final    Ketones 04/26/2023 Negative  Negative mg/dL Final    Protein 04/26/2023 Negative  Negative mg/dL Final    Bilirubin 04/26/2023 Negative  Negative Final    Urobilinogen, Urine 04/26/2023 0.2  Negative Final    Nitrite 04/26/2023 Negative  Negative Final    Leukocyte Esterase 04/26/2023 Negative  Negative Final    Occult Blood 04/26/2023 Negative  Negative Final    Micro Urine Req 04/26/2023 see below   Final    Comment: Microscopic examination not performed when specimen is clear  and chemically negative for protein, blood, leukocyte esterase  and nitrite.      Immunoglobulin A 04/26/2023 140  68 - 408 mg/dL Final    Comment: Total IgA is within or higher than established ranges. Tissue  Transglutaminase, IgA to follow.  REFERENCE INTERVAL: Immunoglobulin A  Access complete set of age- and/or gender-specific reference  intervals for this test in the [facility]  [facility]atory Test Directory  ([facility]).  Performed By: [facility]  [address] [place] [id number]  : Jay Dexter MD, PhD      Fasting Status 04/26/2023 Fasting   Final    Correct Calcium 04/26/2023 9.3  8.5 - 10.5 mg/dL Final    GFR (CKD-EPI) 04/26/2023 87  >60 mL/min/1.73 m 2 Final    Comment: Estimated Glomerular Filtration Rate is calculated using  race neutral CKD-EPI 2021 equation per [facility]-ASN recommendations.      t-TG IgA 04/26/2023 4 (H)  0 - 3 U/mL Final    Comment: Tissue Transglutaminase, IgA is weak to moderate positive.  Endomysial IgA Titer and Deamidated Gliadin Peptide, IgA to follow.  INTERPRETIVE INFORMATION: Tissue Transglutaminase (tTG) Antibody,  IgA  3 U/mL or less: Negative  4-10 U/mL: Weak Positive  11 U/mL or greater: Positive  Presence of the tissue transglutaminase (tTG) IgA antibody is  associated with glutensensitive enteropathies such as celiac  disease and dermatitis herpetiformis. tTG IgA antibody  concentrations greater than 40 U/mL usually correlate with results  of duodenal biopsies consistent with a diagnosis of celiac  disease. For antibody concentrations greater or equal to 4 U/mL  but less than or equal to 40 U/mL, additional testing for  endomysial (EMA) IgA concentrations may improve the positive  predictive value for disease.  Performed By: [facility]  [address] [place], [place] [id number]  : Jay Dexter MD, PhD      Endomysial IgA 04/26/2023 <1:10  <1:10 Final    Comment: INTERPRETIVE INFORMATION: Endomysial Antibody, IgA Titer  The endomysial antigen has been identified as the protein  cross-linking enzyme known as tissue transglutaminase.  Performed By: [facility]  [address] [place] [address]  : Jay Dexter MD, PhD      Gliadin Antibodies Iga 04/26/2023 12  0 - 19 Units Final    Comment: No further celiac testing to be  "performed.  INTERPRETIVE INFORMATION: Deamidated Gliadin Peptide (DGP) Ab, IgA  19 Units or less: ........... Negative  20-30 Units: ................ Weak Positive  31 Units or greater: ........ Positive  Performed By: [facility]  [address] [place] [id number]  : Jay Dexter MD, PhD         Current Outpatient Medications Ordered in Epic   Medication Sig Dispense Refill    atorvastatin (LIPITOR) 10 MG Tab TAKE 1 TABLET DAILY 100 Tablet 3    acetaminophen (TYLENOL) 650 MG CR tablet Take 650 mg by mouth every 6 hours as needed.      Cholecalciferol (VITAMIN D3) 2000 UNIT Cap Take 1 Capsule by mouth every day.      MAGNESIUM CITRATE PO Take  by mouth. Unable to recall dose - as needed       No current Epic-ordered facility-administered medications on file.       Past Medical History:   Diagnosis Date    Arthritis     arms and legs    BMI 27.0-27.9,adult 2021    Heart burn     Gerd    High cholesterol     Hyperlipidemia     Hypertension     suspected TIA     no deficits       Social History     Tobacco Use    Smoking status: Former     Packs/day: 1.00     Years: 5.00     Pack years: 5.00     Types: Cigarettes     Quit date: 1969     Years since quittin.2    Smokeless tobacco: Never    Tobacco comments:     Smoked for about 4-5 ys and quit in .   Vaping Use    Vaping Use: Never used   Substance Use Topics    Alcohol use: Yes     Alcohol/week: 0.0 - 1.2 oz     Comment: a month    Drug use: No       Allergies:  Bee    Health Maintenance: d/w him covid booster      Objective:       Exam:  /80 (BP Location: Right arm, Patient Position: Sitting, BP Cuff Size: Adult)   Pulse 61   Temp 36.8 °C (98.2 °F) (Temporal)   Resp 16   Ht 1.803 m (5' 11\")   Wt 87.1 kg (192 lb)   SpO2 99%   BMI 26.78 kg/m²   Body mass index is 26.78 kg/m².  Wt Readings from Last 4 Encounters:   23 87.1 kg (192 lb)   23 82.6 kg (182 lb)   23 82.5 kg (181 lb " 12.8 oz)   09/19/22 86.6 kg (190 lb 14.4 oz)       Gen: Alert and oriented, No apparent distress. Appropriately groomed.  Neck: Neck is supple without lymphadenopathy.No thyromegaly.   Lungs: Normal effort, CTA bilaterally, no wheezes, rhonchi, or rales  CV: Regular rate and rhythm. Murmur No Lower extremity edema  Skin: No rash noted.      Assessment & Plan:     83 y.o. male with the following -     1. Syncope, unspecified syncope type  -2. Vasovagal syncope  New issue. Improved now. Had two incidents last month. Both appear to be related to heat, possible dehydration and not eating. Encouraged to carry water and snacks when he hikes. Echo scheduled for next week. Precautions dw him.  US-CAROTID DOPPLER BILAT; Future      3. Thrombocytopenia (HCC)  Chronic.  Stable.  We will continue to monitor.  - CBC WITH DIFFERENTIAL; Future    4. Dyslipidemia  5. Atherosclerosis of abdominal aorta (HCC)  Chronic medical diagnosis.  Stable.  Continue with Lipitor 10 mg.  Discussed with patient that he has had several imagings over the last few years with atherosclerotic changes in the arteries.  Patient agrees and will continue with his statin.    6. Aneurysm of common iliac artery (HCC)  Chronic medical diagnosis.  Recent sonogram does show an increase in size of his aneurysm.  However, his previous imaging completed in February 2022 was through a CAT scan and at that time his aneurysm was measuring 2.1 cm.  Encouraged to follow-up with his vascular doctor, Dr. Daugherty.  Denies any pain or color changes to lower extremities.          I spent a total of 50 minutes with chart review/prep, review of other providers' records, imaging/lab review, face to face time for history/examination, ordering, prescribing, review of results/meds/treatment plan with patient/family/caregiver, documentation in EMR, care coordination (as needed).         Return in about 3 months (around 8/2/2023).    Please note that this dictation was created using  voice recognition software. I have made every reasonable attempt to correct obvious errors, but I expect that there are errors of grammar and possibly content that I did not discover before finalizing the note.

## 2023-05-02 NOTE — ASSESSMENT & PLAN NOTE
Chronic medical condition.  Currently taking lipitor 10mg am.  Tolerating the medication well without any side effects.  Patient denies chest pain or lower extremity muscle cramps.  Last set of labs from April were stable. Asking about d/cing lipitor- gives him dry mouth.     Latest Reference Range & Units 04/26/23 08:04   Cholesterol,Tot 100 - 199 mg/dL 136   Triglycerides 0 - 149 mg/dL 62   HDL >=40 mg/dL 60   LDL <100 mg/dL 64

## 2023-05-11 ENCOUNTER — HOSPITAL ENCOUNTER (OUTPATIENT)
Dept: CARDIOLOGY | Facility: MEDICAL CENTER | Age: 83
End: 2023-05-11
Attending: FAMILY MEDICINE
Payer: MEDICARE

## 2023-05-11 DIAGNOSIS — R01.1 HEART MURMUR: ICD-10-CM

## 2023-05-11 DIAGNOSIS — E78.5 DYSLIPIDEMIA: ICD-10-CM

## 2023-05-11 LAB
LV EJECT FRACT  99904: 60
LV EJECT FRACT MOD 2C 99903: 53.22
LV EJECT FRACT MOD 4C 99902: 61.5
LV EJECT FRACT MOD BP 99901: 54.89

## 2023-05-11 PROCEDURE — 93306 TTE W/DOPPLER COMPLETE: CPT

## 2023-05-11 PROCEDURE — 93306 TTE W/DOPPLER COMPLETE: CPT | Mod: 26 | Performed by: INTERNAL MEDICINE

## 2023-06-12 ENCOUNTER — APPOINTMENT (OUTPATIENT)
Dept: RADIOLOGY | Facility: MEDICAL CENTER | Age: 83
End: 2023-06-12
Attending: FAMILY MEDICINE
Payer: MEDICARE

## 2023-06-12 DIAGNOSIS — R55 SYNCOPE, UNSPECIFIED SYNCOPE TYPE: ICD-10-CM

## 2023-06-12 PROCEDURE — 93880 EXTRACRANIAL BILAT STUDY: CPT

## 2023-07-20 ENCOUNTER — DOCUMENTATION (OUTPATIENT)
Dept: HEALTH INFORMATION MANAGEMENT | Facility: OTHER | Age: 83
End: 2023-07-20
Payer: MEDICARE

## 2023-08-03 ENCOUNTER — HOSPITAL ENCOUNTER (OUTPATIENT)
Dept: LAB | Facility: MEDICAL CENTER | Age: 83
End: 2023-08-03
Attending: FAMILY MEDICINE
Payer: MEDICARE

## 2023-08-03 DIAGNOSIS — D69.6 THROMBOCYTOPENIA (HCC): ICD-10-CM

## 2023-08-03 LAB
BASOPHILS # BLD AUTO: 0.3 % (ref 0–1.8)
BASOPHILS # BLD: 0.01 K/UL (ref 0–0.12)
EOSINOPHIL # BLD AUTO: 0.04 K/UL (ref 0–0.51)
EOSINOPHIL NFR BLD: 1.1 % (ref 0–6.9)
ERYTHROCYTE [DISTWIDTH] IN BLOOD BY AUTOMATED COUNT: 44.6 FL (ref 35.9–50)
HCT VFR BLD AUTO: 43.3 % (ref 42–52)
HGB BLD-MCNC: 14.3 G/DL (ref 14–18)
IMM GRANULOCYTES # BLD AUTO: 0.02 K/UL (ref 0–0.11)
IMM GRANULOCYTES NFR BLD AUTO: 0.5 % (ref 0–0.9)
LYMPHOCYTES # BLD AUTO: 1.3 K/UL (ref 1–4.8)
LYMPHOCYTES NFR BLD: 34.9 % (ref 22–41)
MCH RBC QN AUTO: 32.4 PG (ref 27–33)
MCHC RBC AUTO-ENTMCNC: 33 G/DL (ref 32.3–36.5)
MCV RBC AUTO: 98.2 FL (ref 81.4–97.8)
MONOCYTES # BLD AUTO: 0.69 K/UL (ref 0–0.85)
MONOCYTES NFR BLD AUTO: 18.5 % (ref 0–13.4)
NEUTROPHILS # BLD AUTO: 1.66 K/UL (ref 1.82–7.42)
NEUTROPHILS NFR BLD: 44.7 % (ref 44–72)
NRBC # BLD AUTO: 0 K/UL
NRBC BLD-RTO: 0 /100 WBC (ref 0–0.2)
PLATELET # BLD AUTO: 129 K/UL (ref 164–446)
PMV BLD AUTO: 11.7 FL (ref 9–12.9)
RBC # BLD AUTO: 4.41 M/UL (ref 4.7–6.1)
WBC # BLD AUTO: 3.7 K/UL (ref 4.8–10.8)

## 2023-08-03 PROCEDURE — 85025 COMPLETE CBC W/AUTO DIFF WBC: CPT

## 2023-08-03 PROCEDURE — 36415 COLL VENOUS BLD VENIPUNCTURE: CPT

## 2023-08-15 ENCOUNTER — OFFICE VISIT (OUTPATIENT)
Dept: MEDICAL GROUP | Facility: PHYSICIAN GROUP | Age: 83
End: 2023-08-15
Payer: MEDICARE

## 2023-08-15 VITALS
OXYGEN SATURATION: 100 % | WEIGHT: 194.2 LBS | BODY MASS INDEX: 27.19 KG/M2 | DIASTOLIC BLOOD PRESSURE: 78 MMHG | HEIGHT: 71 IN | HEART RATE: 60 BPM | TEMPERATURE: 98 F | RESPIRATION RATE: 16 BRPM | SYSTOLIC BLOOD PRESSURE: 126 MMHG

## 2023-08-15 DIAGNOSIS — G89.29 CHRONIC BILATERAL LOW BACK PAIN WITH RIGHT-SIDED SCIATICA: ICD-10-CM

## 2023-08-15 DIAGNOSIS — G89.29 CHRONIC PAIN OF BOTH KNEES: ICD-10-CM

## 2023-08-15 DIAGNOSIS — D70.8 OTHER NEUTROPENIA (HCC): ICD-10-CM

## 2023-08-15 DIAGNOSIS — R01.1 HEART MURMUR: ICD-10-CM

## 2023-08-15 DIAGNOSIS — D69.6 THROMBOCYTOPENIA (HCC): ICD-10-CM

## 2023-08-15 DIAGNOSIS — M25.561 CHRONIC PAIN OF BOTH KNEES: ICD-10-CM

## 2023-08-15 DIAGNOSIS — M25.562 CHRONIC PAIN OF BOTH KNEES: ICD-10-CM

## 2023-08-15 DIAGNOSIS — M54.41 CHRONIC BILATERAL LOW BACK PAIN WITH RIGHT-SIDED SCIATICA: ICD-10-CM

## 2023-08-15 PROCEDURE — 99214 OFFICE O/P EST MOD 30 MIN: CPT | Performed by: FAMILY MEDICINE

## 2023-08-15 PROCEDURE — 3074F SYST BP LT 130 MM HG: CPT | Performed by: FAMILY MEDICINE

## 2023-08-15 PROCEDURE — 3078F DIAST BP <80 MM HG: CPT | Performed by: FAMILY MEDICINE

## 2023-08-15 ASSESSMENT — ENCOUNTER SYMPTOMS
CHILLS: 0
SHORTNESS OF BREATH: 0
BACK PAIN: 1
PALPITATIONS: 0
FEVER: 0

## 2023-08-15 ASSESSMENT — FIBROSIS 4 INDEX: FIB4 SCORE: 2.89

## 2023-08-15 NOTE — PROGRESS NOTES
Subjective:     CC:   Chief Complaint   Patient presents with    Follow-Up     Labs done 8/3/2023         HPI:   Michael presents today for a follow-up visit and to discuss recent lab results.  Last seen by me on May 2.    Since our last appointment, has been seen by:  Echo completed in May   Carotids completed in June     Problem   Chronic Pain of Both Knees    Taking tylenol 500mg prn- 2-3 times a week. Helps.   Continues to have bilateral knee and lower back pain- right sided. Mainly right sided pain down to right knee. Occasionally to the left side. Denies any bowel/bladderr dysfunctions.  Previous PCP- sent him to Dr Daugherty  Knee pain during hiking or going up and down stairs. Walks 1-2 hours daily.      Thrombocytopenia (Hcc)    Noted since 2018.  Platelets continued to be low at 129k.  Averages 1 drink a night.  Denies any easy bruising or nosebleeds.  Has been in the SALAS study     Other Neutropenia (Hcc)    chronic  Recent labs with decreased WBC at 3.7  And absolute neutropenia at 1.66. denies any frequent infections.     Heart Murmur    Chronic.   Echo completed in may 2023 with  with borderline aortic valve stenosis, mild AI, moderately dilated right ventricle, RVSP estimated to be 30 mmHg.   This was ordered after he had a syncopal episode earlier this year.    Carotids completed in June with findings of no significant stenosis but mild amount of atherosclerotic plaque.           Current Outpatient Medications Ordered in Epic   Medication Sig Dispense Refill    atorvastatin (LIPITOR) 10 MG Tab TAKE 1 TABLET DAILY 100 Tablet 3    acetaminophen (TYLENOL) 650 MG CR tablet Take 650 mg by mouth every 6 hours as needed.      Cholecalciferol (VITAMIN D3) 2000 UNIT Cap Take 1 Capsule by mouth every day.      MAGNESIUM CITRATE PO Take  by mouth. Unable to recall dose - as needed       No current Epic-ordered facility-administered medications on file.         ROS:  Review of Systems   Constitutional:  Negative for  "chills and fever.   Respiratory:  Negative for shortness of breath.    Cardiovascular:  Negative for chest pain, palpitations and leg swelling.   Musculoskeletal:  Positive for back pain and joint pain (bilateral knee pain).       Objective:     Exam:  /78 (BP Location: Left arm, Patient Position: Sitting, BP Cuff Size: Adult)   Pulse 60   Temp 36.7 °C (98 °F)   Resp 16   Ht 1.803 m (5' 11\")   Wt 88.1 kg (194 lb 3.2 oz)   SpO2 100%   BMI 27.09 kg/m²  Body mass index is 27.09 kg/m².    Physical Exam  Constitutional:       Appearance: Normal appearance.   Eyes:      Extraocular Movements: Extraocular movements intact.   Cardiovascular:      Rate and Rhythm: Normal rate and regular rhythm.   Pulmonary:      Effort: Pulmonary effort is normal.      Breath sounds: Normal breath sounds.   Neurological:      Mental Status: He is alert.   Psychiatric:         Mood and Affect: Mood normal.         Behavior: Behavior normal.             Labs: see above    Assessment & Plan:     83 y.o. male with the following -     1. Other neutropenia (HCC)  2. Thrombocytopenia (HCC)  Chronic diagnosis. Stable. Denies any frequent infections or easy bruising. States that he is participating in the SALAS study.  D/w him checking labs for hepatitis and/or sonogram. For now, will monitor.    3. Chronic bilateral low back pain with right-sided sciatica  Chronic. Not improving.  - DX-LUMBAR SPINE-2 OR 3 VIEWS; Future    4. Chronic pain of both knees  Chronic. Not improving.  Takes tylenol prn.   - DX-KNEE 2- LEFT; Future  - DX-KNEE 2- RIGHT; Future    5. Heart murmur  Chronic. Syncopal episode earlier this year prompted echo and carotids to be ordered.   - REFERRAL TO CARDIOLOGY       I spent a total of 37 minutes with record review, exam, communication with the patient, communication with other providers, and documentation of this encounter.      Return in about 3 months (around 11/15/2023).    Please note that this dictation was " created using voice recognition software. I have made every reasonable attempt to correct obvious errors, but I expect that there are errors of grammar and possibly content that I did not discover before finalizing the note.

## 2023-08-28 ENCOUNTER — HOSPITAL ENCOUNTER (OUTPATIENT)
Dept: RADIOLOGY | Facility: MEDICAL CENTER | Age: 83
End: 2023-08-28
Attending: FAMILY MEDICINE
Payer: MEDICARE

## 2023-08-28 DIAGNOSIS — M54.41 CHRONIC BILATERAL LOW BACK PAIN WITH RIGHT-SIDED SCIATICA: ICD-10-CM

## 2023-08-28 DIAGNOSIS — G89.29 CHRONIC BILATERAL LOW BACK PAIN WITH RIGHT-SIDED SCIATICA: ICD-10-CM

## 2023-08-28 PROCEDURE — 72100 X-RAY EXAM L-S SPINE 2/3 VWS: CPT

## 2023-09-01 ENCOUNTER — TELEPHONE (OUTPATIENT)
Dept: HEALTH INFORMATION MANAGEMENT | Facility: OTHER | Age: 83
End: 2023-09-01
Payer: MEDICARE

## 2023-09-19 ENCOUNTER — HOSPITAL ENCOUNTER (OUTPATIENT)
Dept: RADIOLOGY | Facility: MEDICAL CENTER | Age: 83
End: 2023-09-19
Attending: FAMILY MEDICINE
Payer: MEDICARE

## 2023-09-19 DIAGNOSIS — M25.562 CHRONIC PAIN OF BOTH KNEES: ICD-10-CM

## 2023-09-19 DIAGNOSIS — G89.29 CHRONIC PAIN OF BOTH KNEES: ICD-10-CM

## 2023-09-19 DIAGNOSIS — M25.561 CHRONIC PAIN OF BOTH KNEES: ICD-10-CM

## 2023-09-19 PROCEDURE — 73560 X-RAY EXAM OF KNEE 1 OR 2: CPT | Mod: LT

## 2023-09-19 PROCEDURE — 73560 X-RAY EXAM OF KNEE 1 OR 2: CPT | Mod: RT

## 2023-11-21 ENCOUNTER — OFFICE VISIT (OUTPATIENT)
Dept: MEDICAL GROUP | Facility: PHYSICIAN GROUP | Age: 83
End: 2023-11-21
Payer: MEDICARE

## 2023-11-21 VITALS
WEIGHT: 200 LBS | TEMPERATURE: 97.8 F | BODY MASS INDEX: 28 KG/M2 | RESPIRATION RATE: 16 BRPM | HEIGHT: 71 IN | HEART RATE: 52 BPM | SYSTOLIC BLOOD PRESSURE: 120 MMHG | OXYGEN SATURATION: 100 % | DIASTOLIC BLOOD PRESSURE: 72 MMHG

## 2023-11-21 DIAGNOSIS — R73.09 ELEVATED GLUCOSE: ICD-10-CM

## 2023-11-21 DIAGNOSIS — E78.5 DYSLIPIDEMIA: ICD-10-CM

## 2023-11-21 DIAGNOSIS — R01.1 HEART MURMUR: ICD-10-CM

## 2023-11-21 PROBLEM — J34.89 RHINORRHEA: Status: RESOLVED | Noted: 2023-01-31 | Resolved: 2023-11-21

## 2023-11-21 PROCEDURE — 3078F DIAST BP <80 MM HG: CPT | Performed by: FAMILY MEDICINE

## 2023-11-21 PROCEDURE — 99214 OFFICE O/P EST MOD 30 MIN: CPT | Performed by: FAMILY MEDICINE

## 2023-11-21 PROCEDURE — 3074F SYST BP LT 130 MM HG: CPT | Performed by: FAMILY MEDICINE

## 2023-11-21 ASSESSMENT — ENCOUNTER SYMPTOMS
FALLS: 0
BACK PAIN: 1
DIZZINESS: 0
FEVER: 0
SHORTNESS OF BREATH: 0
CHILLS: 0

## 2023-11-21 ASSESSMENT — FIBROSIS 4 INDEX: FIB4 SCORE: 2.89

## 2023-11-21 NOTE — PROGRESS NOTES
Subjective:     CC:   Chief Complaint   Patient presents with    Follow-Up     3 months    Lab Results         HPI:   Michael presents today for a follow-up visit and to discuss results..  Last seen by me on August 15  At that time x-rays of his lower back and knees were ordered.  He was also referred to cardiology.    Since our last appointment, has been seen by:  hearing test- completed in October- hearing aides recommended.     Taking tylenol x 1 daily for knee and back pain  Able to exercise and lift weights.        Problem   Elevated Glucose    Chronic medical diagnosis  April labs had fasting glucose at 105.  Hemoglobin A1c from 2022 was at 5.5%.     Heart Murmur    Chronic.  Was referred to cardiology in August.  Has not scheduled an appointment with them yet.  Encouraged to do so.      Echo completed in may 2023 with  with borderline aortic valve stenosis, mild AI, moderately dilated right ventricle, RVSP estimated to be 30 mmHg.   This was ordered after he had a syncopal episode earlier this year.    Carotids completed in June with findings of no significant stenosis but mild amount of atherosclerotic plaque.       Dyslipidemia    Chronic. Currently taking lipitor 10mg daily. Will increase to 20mg.    Latest Reference Range & Units 04/26/23 08:04   Cholesterol,Tot 100 - 199 mg/dL 136   Triglycerides 0 - 149 mg/dL 62   HDL >=40 mg/dL 60   LDL <100 mg/dL 64          Current Outpatient Medications Ordered in Epic   Medication Sig Dispense Refill    CALCIUM CITRATE PO Take 100 mg by mouth.      atorvastatin (LIPITOR) 10 MG Tab TAKE 1 TABLET DAILY 100 Tablet 3    acetaminophen (TYLENOL) 650 MG CR tablet Take 650 mg by mouth every 6 hours as needed.      Cholecalciferol (VITAMIN D3) 2000 UNIT Cap Take 1 Capsule by mouth every day.      MAGNESIUM CITRATE PO Take  by mouth. Unable to recall dose - as needed       No current Epic-ordered facility-administered medications on file.       Health Maintenance: questions  "about RSV vaccine answered. Encouraged to receive it.    ROS:  Review of Systems   Constitutional:  Negative for chills and fever.   Respiratory:  Negative for shortness of breath.    Cardiovascular:  Negative for chest pain.   Musculoskeletal:  Positive for back pain (right sciatica) and joint pain (bilateral knee pain). Negative for falls.   Neurological:  Negative for dizziness.       Objective:     Exam:  /72   Pulse (!) 52   Temp 36.6 °C (97.8 °F) (Temporal)   Resp 16   Ht 1.803 m (5' 11\")   Wt 90.7 kg (200 lb)   SpO2 100%   BMI 27.89 kg/m²  Body mass index is 27.89 kg/m².    Physical Exam  Constitutional:       Appearance: Normal appearance.   Eyes:      Extraocular Movements: Extraocular movements intact.   Cardiovascular:      Rate and Rhythm: Normal rate and regular rhythm.   Pulmonary:      Effort: Pulmonary effort is normal.      Breath sounds: Normal breath sounds.   Musculoskeletal:      Right lower leg: No edema.      Left lower leg: No edema.   Neurological:      Mental Status: He is alert.   Psychiatric:         Mood and Affect: Mood normal.         Behavior: Behavior normal.             Labs: see above    Assessment & Plan:     83 y.o. male with the following -     1. Dyslipidemia  Chronic medical diagnosis.  Imaging does show atherosclerosis.  We will increase his atorvastatin from 10 mg to 20 mg.  Repeat labs in 3 months.  - Lipid Profile; Future  - TSH WITH REFLEX TO FT4; Future  - VITAMIN B12; Future  - VITAMIN D,25 HYDROXY (DEFICIENCY); Future  - Comp Metabolic Panel; Future  - CBC WITH DIFFERENTIAL; Future    2. Elevated glucose  Chronic medical diagnosis.  Stable.  - HEMOGLOBIN A1C; Future    3. Heart murmur  Chronic medical diagnosis.  Stable.  Encouraged to schedule follow-up with cardiology.  Phone number provided to patient.          Return in about 3 months (around 2/21/2024) for hyperlipidemia.    Please note that this dictation was created using voice recognition software. " I have made every reasonable attempt to correct obvious errors, but I expect that there are errors of grammar and possibly content that I did not discover before finalizing the note.

## 2024-01-02 DIAGNOSIS — E78.5 DYSLIPIDEMIA: ICD-10-CM

## 2024-01-02 RX ORDER — ATORVASTATIN CALCIUM 10 MG/1
TABLET, FILM COATED ORAL
Qty: 100 TABLET | Refills: 3 | Status: SHIPPED | OUTPATIENT
Start: 2024-01-02 | End: 2024-01-08 | Stop reason: SDUPTHER

## 2024-01-08 DIAGNOSIS — E78.5 DYSLIPIDEMIA: ICD-10-CM

## 2024-01-08 RX ORDER — ATORVASTATIN CALCIUM 10 MG/1
TABLET, FILM COATED ORAL
Qty: 100 TABLET | Refills: 3 | Status: SHIPPED
Start: 2024-01-08 | End: 2024-03-05

## 2024-01-08 NOTE — TELEPHONE ENCOUNTER
Requested Prescriptions     Pending Prescriptions Disp Refills    atorvastatin (LIPITOR) 10 MG Tab 100 Tablet 3     Sig: TAKE ONE TABLET BY MOUTH ONE TIME DAILY     Shoshana Britt M.D.

## 2024-01-25 ENCOUNTER — APPOINTMENT (OUTPATIENT)
Dept: CARDIOLOGY | Facility: MEDICAL CENTER | Age: 84
End: 2024-01-25
Attending: FAMILY MEDICINE
Payer: MEDICARE

## 2024-02-05 ENCOUNTER — OFFICE VISIT (OUTPATIENT)
Dept: CARDIOLOGY | Facility: MEDICAL CENTER | Age: 84
End: 2024-02-05
Attending: FAMILY MEDICINE
Payer: MEDICARE

## 2024-02-05 VITALS
HEART RATE: 60 BPM | BODY MASS INDEX: 26.88 KG/M2 | HEIGHT: 71 IN | SYSTOLIC BLOOD PRESSURE: 136 MMHG | WEIGHT: 192 LBS | DIASTOLIC BLOOD PRESSURE: 64 MMHG | RESPIRATION RATE: 15 BRPM | OXYGEN SATURATION: 93 %

## 2024-02-05 DIAGNOSIS — I77.9 DISORDER OF ARTERIES AND ARTERIOLES, UNSPECIFIED (HCC): ICD-10-CM

## 2024-02-05 DIAGNOSIS — I70.0 ATHEROSCLEROSIS OF ABDOMINAL AORTA (HCC): ICD-10-CM

## 2024-02-05 DIAGNOSIS — R01.1 HEART MURMUR: ICD-10-CM

## 2024-02-05 DIAGNOSIS — I72.3 ANEURYSM OF COMMON ILIAC ARTERY (HCC): ICD-10-CM

## 2024-02-05 DIAGNOSIS — Z86.79 HISTORY OF HYPERTENSION: ICD-10-CM

## 2024-02-05 DIAGNOSIS — E78.5 DYSLIPIDEMIA: ICD-10-CM

## 2024-02-05 DIAGNOSIS — Z00.00 ENCOUNTER FOR MEDICAL EXAMINATION TO ESTABLISH CARE: ICD-10-CM

## 2024-02-05 PROCEDURE — 99204 OFFICE O/P NEW MOD 45 MIN: CPT | Performed by: INTERNAL MEDICINE

## 2024-02-05 PROCEDURE — 3075F SYST BP GE 130 - 139MM HG: CPT | Performed by: INTERNAL MEDICINE

## 2024-02-05 PROCEDURE — 93005 ELECTROCARDIOGRAM TRACING: CPT | Performed by: INTERNAL MEDICINE

## 2024-02-05 PROCEDURE — 3078F DIAST BP <80 MM HG: CPT | Performed by: INTERNAL MEDICINE

## 2024-02-05 PROCEDURE — 99211 OFF/OP EST MAY X REQ PHY/QHP: CPT | Performed by: INTERNAL MEDICINE

## 2024-02-05 RX ORDER — LOSARTAN POTASSIUM 25 MG/1
12.5 TABLET ORAL DAILY
Qty: 30 TABLET | Refills: 3 | Status: SHIPPED | OUTPATIENT
Start: 2024-02-05

## 2024-02-05 RX ORDER — LISINOPRIL 2.5 MG/1
2.5 TABLET ORAL DAILY
Qty: 30 TABLET | Refills: 3 | Status: SHIPPED
Start: 2024-02-05 | End: 2024-02-05

## 2024-02-05 ASSESSMENT — ENCOUNTER SYMPTOMS
ORTHOPNEA: 0
VOMITING: 0
FLANK PAIN: 0
PALPITATIONS: 0
DEPRESSION: 0
CONSTIPATION: 0
DYSPNEA ON EXERTION: 0
SYNCOPE: 0
SHORTNESS OF BREATH: 0
BLURRED VISION: 0
ALTERED MENTAL STATUS: 0
CLAUDICATION: 0
DECREASED APPETITE: 0
COUGH: 0
PND: 0
NAUSEA: 0
DIZZINESS: 0
BACK PAIN: 0
HEARTBURN: 0
IRREGULAR HEARTBEAT: 0
ABDOMINAL PAIN: 0
WEIGHT GAIN: 0
FEVER: 0
DIARRHEA: 0
NEAR-SYNCOPE: 0
WEIGHT LOSS: 0

## 2024-02-05 ASSESSMENT — FIBROSIS 4 INDEX: FIB4 SCORE: 2.92

## 2024-02-05 NOTE — PROGRESS NOTES
Cardiology Note    Chief Complaint   Patient presents with    Establish Care    Dyslipidemia       History of Present Illness: Michael Pradhan is a 84 y.o. male who presents for initial visit.    Doing well. No exertional symptoms. No cardiac complaints other than prior episodes of syncope. Recalls was hiking about a year ago [place] in [facility]. Has since improved hydration and alimentation without recurrence. Continues to exercise. Walks at least an hour daily without cardiovascular limitations. He also does lift weights and concerned for aneurysm with this. Former tobacco. No other toxic social habits.     Review of Systems   Constitutional: Negative for decreased appetite, fever, malaise/fatigue, weight gain and weight loss.   HENT:  Negative for congestion and nosebleeds.    Eyes:  Negative for blurred vision.   Cardiovascular:  Negative for chest pain, claudication, dyspnea on exertion, irregular heartbeat, leg swelling, near-syncope, orthopnea, palpitations, paroxysmal nocturnal dyspnea and syncope.   Respiratory:  Negative for cough and shortness of breath.    Endocrine: Negative for cold intolerance and heat intolerance.   Skin:  Negative for rash.   Musculoskeletal:  Negative for back pain.   Gastrointestinal:  Negative for abdominal pain, constipation, diarrhea, heartburn, melena, nausea and vomiting.   Genitourinary:  Negative for dysuria, flank pain and hematuria.   Neurological:  Negative for dizziness.   Psychiatric/Behavioral:  Negative for altered mental status and depression.          Past Medical History:   Diagnosis Date    Arthritis     arms and legs    BMI 27.0-27.9,adult 05/27/2021    Heart burn     Gerd    High cholesterol     Hyperlipidemia     Hypertension     Rhinorrhea 01/31/2023    suspected TIA 2018    no deficits         Past Surgical History:   Procedure Laterality Date    INGUINAL HERNIA LAPAROSCOPIC Bilateral 1/22/2020    Procedure: REPAIR, HERNIA, INGUINAL,  LAPAROSCOPIC- WITH MESH;  Surgeon: Niall Linares M.D.;  Location: SURGERY [facility];  Service: General    COLONOSCOPY      TONSILLECTOMY           Current Outpatient Medications   Medication Sig Dispense Refill    losartan (COZAAR) 25 MG Tab Take 0.5 Tablets by mouth every day. 30 Tablet 3    atorvastatin (LIPITOR) 10 MG Tab TAKE ONE TABLET BY MOUTH ONE TIME DAILY (Patient taking differently: 20 mg every day.) 100 Tablet 3    acetaminophen (TYLENOL) 650 MG CR tablet Take 650 mg by mouth every 6 hours as needed.      Cholecalciferol (VITAMIN D3) 2000 UNIT Cap Take 1 Capsule by mouth every day.      MAGNESIUM CITRATE PO Take  by mouth. Unable to recall dose - as needed       No current facility-administered medications for this visit.         Allergies   Allergen Reactions    Bee Hives and Swelling         Family History   Problem Relation Age of Onset    Diabetes Mother     Hypertension Father     Diabetes Sister     Diabetes Sister     Other Brother         Spinal Miningitis    Schizophrenia Brother     Cancer Brother         Esophigial    Dementia Maternal Grandmother     Dementia Paternal Grandmother     Colon Cancer Paternal Grandfather          Social History     Socioeconomic History    Marital status:      Spouse name: Not on file    Number of children: Not on file    Years of education: Not on file    Highest education level: Not on file   Occupational History    Not on file   Tobacco Use    Smoking status: Former     Current packs/day: 0.00     Average packs/day: 1 pack/day for 5.0 years (5.0 ttl pk-yrs)     Types: Cigarettes     Start date: 1964     Quit date: 1969     Years since quittin.0    Smokeless tobacco: Never    Tobacco comments:     Smoked for about 4-5 ys and quit in .   Vaping Use    Vaping Use: Never used   Substance and Sexual Activity    Alcohol use: Yes     Alcohol/week: 0.0 - 1.2 oz     Comment: a month    Drug use: No    Sexual activity: Yes     Partners:  "Female   Other Topics Concern    Not on file   Social History Narrative    He is originally from [place], his wife   (she had autoimmune disease and lung and liver problems). They did not have children. His in-laws live in the [place] Area. He hasn't seen his siblings in years. He walks twice a day,lives in an apartment now, tried [employer] for 6 months. Is looking forward to more social opportunities. 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 He used to enjoy crossword puzzles but struggles with his vision now.     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      Social Determinants of Health     Financial Resource Strain: Not on file   Food Insecurity: Not on file   Transportation Needs: Not on file   Physical Activity: Not on file   Stress: Not on file   Social Connections: Not on file   Intimate Partner Violence: Not on file   Housing Stability: Not on file         Physical Exam:  Ambulatory Vitals  /64 (BP Location: Left arm, Patient Position: Sitting, BP Cuff Size: Adult)   Pulse 60   Resp 15   Ht 1.803 m (5' 11\")   Wt 87.1 kg (192 lb)   SpO2 93%    BP Readings from Last 4 Encounters:   24 136/64   23 120/72   08/15/23 126/78   23 138/80     Weight/BMI:   Vitals:    24 0848   BP: 136/64   Weight: 87.1 kg (192 lb)   Height: 1.803 m (5' 11\")    Body mass index is 26.78 kg/m².  Wt Readings from Last 4 Encounters:   24 87.1 kg (192 lb)   23 90.7 kg (200 lb)   08/15/23 88.1 kg (194 lb 3.2 oz)   23 87.1 kg (192 lb)       Physical Exam  Constitutional:       General: He is not in acute distress.  HENT:      Head: Normocephalic and atraumatic.   Eyes:      Conjunctiva/sclera: Conjunctivae normal.      Pupils: Pupils are equal, round, and reactive to light.   Neck:      Vascular: No JVD.   Cardiovascular:      Rate and Rhythm: Normal rate and regular rhythm.      Heart sounds: Normal heart " "sounds. No murmur heard.     No friction rub. No gallop.   Pulmonary:      Effort: Pulmonary effort is normal. No respiratory distress.      Breath sounds: Normal breath sounds. No wheezing or rales.   Chest:      Chest wall: No tenderness.   Abdominal:      General: Bowel sounds are normal. There is no distension.      Palpations: Abdomen is soft.   Musculoskeletal:      Cervical back: Normal range of motion and neck supple.   Skin:     General: Skin is warm and dry.   Neurological:      Mental Status: He is alert and oriented to person, place, and time.   Psychiatric:         Mood and Affect: Affect normal.         Judgment: Judgment normal.         Lab Data Review:  Lab Results   Component Value Date/Time    CHOLSTRLTOT 136 04/26/2023 08:04 AM    LDL 64 04/26/2023 08:04 AM    HDL 60 04/26/2023 08:04 AM    TRIGLYCERIDE 62 04/26/2023 08:04 AM       Lab Results   Component Value Date/Time    SODIUM 141 04/26/2023 08:04 AM    POTASSIUM 4.4 04/26/2023 08:04 AM    CHLORIDE 106 04/26/2023 08:04 AM    CO2 27 04/26/2023 08:04 AM    GLUCOSE 105 (H) 04/26/2023 08:04 AM    BUN 13 04/26/2023 08:04 AM    CREATININE 0.84 04/26/2023 08:04 AM     CrCl cannot be calculated (Patient's most recent lab result is older than the maximum 7 days allowed.).  Lab Results   Component Value Date/Time    ALKPHOSPHAT 65 04/26/2023 08:04 AM    ASTSGOT 23 04/26/2023 08:04 AM    ALTSGPT 25 04/26/2023 08:04 AM    TBILIRUBIN 0.9 04/26/2023 08:04 AM      Lab Results   Component Value Date/Time    WBC 3.7 (L) 08/03/2023 06:26 AM     Lab Results   Component Value Date/Time    HBA1C 5.5 11/22/2022 07:44 AM     No components found for: \"TROP\"      Cardiac Imaging and Procedures Review:      EKG 2/5/24 interpreted by me sinus felix 58 bpm    CTA head 7/2018  IMPRESSION:  1.  No evidence of intracranial vascular occlusion or aneurysm.  2.  Diminutive right vertebral artery which terminates low the level of the basilar artery.    CTA neck " 7/2018  IMPRESSION:  1.  Patent carotid and vertebral arteries without evidence of occlusion or dissection.  2.  Minimal atherosclerotic plaque at the left carotid bifurcation.  3.  Diminutive right vertebral artery which terminates below the level of the basilar artery.    MRI brain 7/2018  IMPRESSION:  1.  Moderate supratentorial white matter disease most consistent with microvascular ischemic change.  2.  No evidence of acute infarction, hemorrhage, or mass lesion.    Mpi spect 7/2018   NUCLEAR IMAGING INTERPRETATION    Physiologic apical thinning versus small infarct.    No reversible ischemia.    Normal LEFT ventricular function.    ECG INTERPRETATION    Negative stress ECG for ischemia.     TTE 5/11/23  CONCLUSIONS  The left ventricle is small in size.  The left ventricular ejection fraction is visually estimated to be 60%.  Moderately dilated right ventricle.  Normal right ventricular systolic function.  Normal inferior vena cava size and inspiratory collapse.  Borderline aortic valve stenosis.  Mild aortic insufficiency.  Right ventricular systolic pressure is estimated to be  30  mmHg.    4/2023 aorta/iliac   CONCLUSIONS   No evidence of abdominal aortic aneurysm.    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       Medical Decision Making:  Problem List Items Addressed This Visit       Dyslipidemia    Heart murmur    History of hypertension    Relevant Medications    losartan (COZAAR) 25 MG Tab    Disorder of arteries and arterioles, unspecified (HCC)    Relevant Medications    losartan (COZAAR) 25 MG Tab    Aneurysm of common iliac artery (HCC)    Relevant Medications    losartan (COZAAR) 25 MG Tab    Atherosclerosis of abdominal aorta (HCC)    Relevant Medications    losartan (COZAAR) 25 MG Tab    Other Relevant Orders    EKG (Completed)    Encounter for medical examination to establish care     Syncope appears resolved. Probably hypovolemia. Monitor for  recurrence.    Murmur with recent echo showing mild AR. Serial imaging.     HLD / aortic atherosclerosis - continue statin. Threshold goal LDL <70. Controlled.    Aneurysm iliac - add losartan improve BP control. Avoid heavy weight lifting change to body weight exercises. Serial imaging. Continue follow up with vascular surgery.    It was my pleasure to meet with Mr. Pradhan.

## 2024-02-06 LAB — EKG IMPRESSION: NORMAL

## 2024-02-06 PROCEDURE — 93010 ELECTROCARDIOGRAM REPORT: CPT | Performed by: INTERNAL MEDICINE

## 2024-02-21 ENCOUNTER — APPOINTMENT (OUTPATIENT)
Dept: RADIOLOGY | Facility: MEDICAL CENTER | Age: 84
End: 2024-02-21
Attending: EMERGENCY MEDICINE
Payer: MEDICARE

## 2024-02-21 ENCOUNTER — HOSPITAL ENCOUNTER (EMERGENCY)
Facility: MEDICAL CENTER | Age: 84
End: 2024-02-21
Attending: EMERGENCY MEDICINE
Payer: MEDICARE

## 2024-02-21 VITALS
TEMPERATURE: 97.7 F | DIASTOLIC BLOOD PRESSURE: 81 MMHG | RESPIRATION RATE: 16 BRPM | WEIGHT: 194 LBS | HEIGHT: 71 IN | SYSTOLIC BLOOD PRESSURE: 173 MMHG | BODY MASS INDEX: 27.16 KG/M2 | HEART RATE: 62 BPM | OXYGEN SATURATION: 96 %

## 2024-02-21 DIAGNOSIS — J18.9 ATYPICAL PNEUMONIA: ICD-10-CM

## 2024-02-21 PROCEDURE — 71045 X-RAY EXAM CHEST 1 VIEW: CPT

## 2024-02-21 PROCEDURE — 99284 EMERGENCY DEPT VISIT MOD MDM: CPT | Mod: 25

## 2024-02-21 RX ORDER — AZITHROMYCIN 250 MG/1
TABLET, FILM COATED ORAL
Qty: 6 TABLET | Refills: 0 | Status: ACTIVE | OUTPATIENT
Start: 2024-02-21 | End: 2024-02-26

## 2024-02-21 ASSESSMENT — FIBROSIS 4 INDEX: FIB4 SCORE: 2.92

## 2024-02-21 NOTE — ED PROVIDER NOTES
ER Provider Note    Scribed for Dr. Socrates Fierro M.D. by Misty Knight. 2/21/2024  11:13 AM    Primary Care Provider: Shoshana Britt M.D.    CHIEF COMPLAINT  Respiratory illness: Cough with sputum      EXTERNAL RECORDS REVIEWED  Outpatient Notes The patient was seen by cardiology 2/5/24. He was placed on a lipid lowering medication.    HPI/ROS    Michael Pradhan is a 84 y.o. male who presents to the ED for respiratory illness onset 3 weeks ago. The patient reports that he has been having respiratory issues for the last 3 weeks. He has associated cough with sputum, subjective fever, and fatigue. He decided to present to the ED today due to his symptoms not improving. 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 There are no known alleviating or exacerbating factors. The patient does not have any known allergies to medications.    PAST MEDICAL HISTORY  Past Medical History:   Diagnosis Date    Arthritis     arms and legs    BMI 27.0-27.9,adult 05/27/2021    Heart burn     Gerd    High cholesterol     Hyperlipidemia     Hypertension     Rhinorrhea 01/31/2023    suspected TIA 2018    no deficits       SURGICAL HISTORY  Past Surgical History:   Procedure Laterality Date    INGUINAL HERNIA LAPAROSCOPIC Bilateral 1/22/2020    Procedure: REPAIR, HERNIA, INGUINAL, LAPAROSCOPIC- WITH MESH;  Surgeon: Niall Linares M.D.;  Location: SURGERY [facility];  Service: General    COLONOSCOPY      TONSILLECTOMY         FAMILY HISTORY  Family History   Problem Relation Age of Onset    Diabetes Mother     Hypertension Father     Diabetes Sister     Diabetes Sister     Other Brother         Spinal Miningitis    Schizophrenia Brother     Cancer Brother         Esophigial    Dementia Maternal Grandmother     Dementia Paternal Grandmother     Colon Cancer Paternal Grandfather        SOCIAL HISTORY   reports that he quit smoking about 55 years ago. His smoking use included cigarettes. He started  "smoking about 60 years ago. He has a 5 pack-year smoking history. He has never used smokeless tobacco. He reports current alcohol use. He reports that he does not use drugs.    CURRENT MEDICATIONS  Discharge Medication List as of 2/21/2024 11:52 AM        CONTINUE these medications which have NOT CHANGED    Details   losartan (COZAAR) 25 MG Tab Take 0.5 Tablets by mouth every day., Disp-30 Tablet, R-3, Normal      atorvastatin (LIPITOR) 10 MG Tab TAKE ONE TABLET BY MOUTH ONE TIME DAILY, Disp-100 Tablet, R-3, Normal      acetaminophen (TYLENOL) 650 MG CR tablet Take 650 mg by mouth every 6 hours as needed., Historical Med      Cholecalciferol (VITAMIN D3) 2000 UNIT Cap Take 1 Capsule by mouth every day., Historical Med      MAGNESIUM CITRATE PO Take  by mouth. Unable to recall dose - as needed, Historical Med             ALLERGIES  Bee    PHYSICAL EXAM  BP (!) 183/99   Pulse (!) 58   Temp 36.7 °C (98 °F) (Temporal)   Resp 14   Ht 1.803 m (5' 11\")   Wt 88 kg (194 lb 0.1 oz)   SpO2 96%   BMI 27.06 kg/m²   Constitutional: Alert in no apparent distress.  HENT: No signs of trauma, Bilateral external ears normal, Nose normal.   Eyes: Pupils are equal and reactive, Conjunctiva normal, Non-icteric.   Neck: Normal range of motion, No tenderness, Supple,   Cardiovascular: Regular rate and rhythm, no murmurs.   Thorax & Lungs: Diffuse scattered rhonchi, No respiratory distress, No wheezing, No chest tenderness.   Abdomen: Bowel sounds normal, Soft, No tenderness, No masses, No pulsatile masses. No peritoneal signs.  Skin: Warm, Dry, No erythema, No rash.   Back: No bony tenderness, No CVA tenderness.   Extremities: No edema, No tenderness, No cyanosis, no tenderness  Neurologic: Alert and oriented  Psychiatric: Affect normal     DIAGNOSTIC STUDIES & PROCEDURES    Radiology:   The attending Emergency Physician has independently interpreted the diagnostic imaging associated with this visit and is awaiting the final reading " from the radiologist, which will be displayed below.    Preliminary interpretation is a follows: No obvious consolidation  Radiologist interpretation:      DX-CHEST-PORTABLE (1 VIEW)   Final Result      No evidence of acute cardiopulmonary process.           COURSE & MEDICAL DECISION MAKING    ED Observation Status? No; Patient does not meet criteria for ED Observation.     INITIAL ASSESSMENT AND PLAN  Care Narrative:       11:13 AM - Patient seen and evaluated at bedside. Discussed plan of care, including imaging. Patient agrees to plan of care. Ordered DX-Chest to evaluate.    12:01 PM - Patient reevaluated at bedside. I discussed the patient's diagnostic study results which show evidence of pneumonia. Discussed plan for discharge, including plan for follow-up, and informed them to return to the [facility] ED with any new or worsening symptoms. Patient was given the opportunity for questions, and I addressed all questions or concerns. He is stable for discharge at this time. Patient verbalizes understanding and support with my plan for discharge.     HTN/IDDM FOLLOW UP:  The patient has known hypertension and is being followed by their primary care doctor    ADDITIONAL PROBLEM LIST AND DISPOSITION  Patient with continued cough that is productive for 3 weeks.  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  There is no history of emphysema or COPD.  At this time this x-ray was performed which shows no obvious consolidation.  I believe that he meets criteria for treatment for atypical pneumonia.  Will give azithromycin.  I did offer inhaler which she has declined.  There is no hypoxia.  There is no increased respiratory rate.  Will discharge him home with strict return precautions and follow-up.               DISPOSITION AND DISCUSSIONS  I have discussed management of the patient with the following physicians and ARACELI's: None    Discussion of management with other [facility] or appropriate  source(s): None     Escalation of care considered, and ultimately not performed: blood analysis.    Barriers to care at this time, including but not limited to:  None .     Decision tools and prescription drugs considered including, but not limited to: Antibiotics for atypical pneumonia .    The patient will return for new or worsening symptoms and is stable at the time of discharge.    The patient is referred to a primary physician for blood pressure management, diabetic screening, and for all other preventative health concerns.    DISPOSITION:  Patient will be discharged home in stable condition.    FOLLOW UP:  Shoshana Britt M.D.  [address]  [place] [address]  [phone]    In 2 days        OUTPATIENT MEDICATIONS:  Discharge Medication List as of 2/21/2024 11:52 AM        START taking these medications    Details   azithromycin (ZITHROMAX) 250 MG Tab Take 2 Tablets by mouth every day for 1 day, THEN 1 Tablet every day for 4 days., Disp-6 Tablet, R-0, Normal              FINAL IMPRESSION   1. Atypical pneumonia         Misty CORONA (Jonathan), am scribing for, and in the presence of, Socrates Fierro M.D..    Electronically signed by: Misty Knight (Khoaibsiobhan), 2/21/2024    ISocrates M.D. personally performed the services described in this documentation, as scribed by Misty Knight in my presence, and it is both accurate and complete.    The note accurately reflects work and decisions made by me.  Socrates Fierro M.D.  2/21/2024  2:05 PM

## 2024-03-01 ENCOUNTER — HOSPITAL ENCOUNTER (OUTPATIENT)
Dept: LAB | Facility: MEDICAL CENTER | Age: 84
End: 2024-03-01
Attending: FAMILY MEDICINE
Payer: MEDICARE

## 2024-03-01 DIAGNOSIS — E78.5 DYSLIPIDEMIA: ICD-10-CM

## 2024-03-01 DIAGNOSIS — R73.09 ELEVATED GLUCOSE: ICD-10-CM

## 2024-03-01 LAB
25(OH)D3 SERPL-MCNC: 33 NG/ML (ref 30–100)
ALBUMIN SERPL BCP-MCNC: 4.2 G/DL (ref 3.2–4.9)
ALBUMIN/GLOB SERPL: 1.4 G/DL
ALP SERPL-CCNC: 61 U/L (ref 30–99)
ALT SERPL-CCNC: 21 U/L (ref 2–50)
ANION GAP SERPL CALC-SCNC: 8 MMOL/L (ref 7–16)
AST SERPL-CCNC: 27 U/L (ref 12–45)
BASOPHILS # BLD AUTO: 0.5 % (ref 0–1.8)
BASOPHILS # BLD: 0.02 K/UL (ref 0–0.12)
BILIRUB SERPL-MCNC: 0.9 MG/DL (ref 0.1–1.5)
BUN SERPL-MCNC: 20 MG/DL (ref 8–22)
CALCIUM ALBUM COR SERPL-MCNC: 9.5 MG/DL (ref 8.5–10.5)
CALCIUM SERPL-MCNC: 9.7 MG/DL (ref 8.5–10.5)
CHLORIDE SERPL-SCNC: 105 MMOL/L (ref 96–112)
CHOLEST SERPL-MCNC: 110 MG/DL (ref 100–199)
CO2 SERPL-SCNC: 28 MMOL/L (ref 20–33)
CREAT SERPL-MCNC: 0.95 MG/DL (ref 0.5–1.4)
EOSINOPHIL # BLD AUTO: 0.04 K/UL (ref 0–0.51)
EOSINOPHIL NFR BLD: 1 % (ref 0–6.9)
ERYTHROCYTE [DISTWIDTH] IN BLOOD BY AUTOMATED COUNT: 45.3 FL (ref 35.9–50)
EST. AVERAGE GLUCOSE BLD GHB EST-MCNC: 114 MG/DL
GFR SERPLBLD CREATININE-BSD FMLA CKD-EPI: 80 ML/MIN/1.73 M 2
GLOBULIN SER CALC-MCNC: 2.9 G/DL (ref 1.9–3.5)
GLUCOSE SERPL-MCNC: 96 MG/DL (ref 65–99)
HBA1C MFR BLD: 5.6 % (ref 4–5.6)
HCT VFR BLD AUTO: 45.4 % (ref 42–52)
HDLC SERPL-MCNC: 40 MG/DL
HGB BLD-MCNC: 15.5 G/DL (ref 14–18)
IMM GRANULOCYTES # BLD AUTO: 0.05 K/UL (ref 0–0.11)
IMM GRANULOCYTES NFR BLD AUTO: 1.2 % (ref 0–0.9)
LDLC SERPL CALC-MCNC: 56 MG/DL
LYMPHOCYTES # BLD AUTO: 1.26 K/UL (ref 1–4.8)
LYMPHOCYTES NFR BLD: 31.2 % (ref 22–41)
MCH RBC QN AUTO: 34.1 PG (ref 27–33)
MCHC RBC AUTO-ENTMCNC: 34.1 G/DL (ref 32.3–36.5)
MCV RBC AUTO: 99.8 FL (ref 81.4–97.8)
MONOCYTES # BLD AUTO: 0.63 K/UL (ref 0–0.85)
MONOCYTES NFR BLD AUTO: 15.6 % (ref 0–13.4)
NEUTROPHILS # BLD AUTO: 2.04 K/UL (ref 1.82–7.42)
NEUTROPHILS NFR BLD: 50.5 % (ref 44–72)
NRBC # BLD AUTO: 0 K/UL
NRBC BLD-RTO: 0 /100 WBC (ref 0–0.2)
PLATELET # BLD AUTO: 165 K/UL (ref 164–446)
PMV BLD AUTO: 11.9 FL (ref 9–12.9)
POTASSIUM SERPL-SCNC: 4.3 MMOL/L (ref 3.6–5.5)
PROT SERPL-MCNC: 7.1 G/DL (ref 6–8.2)
RBC # BLD AUTO: 4.55 M/UL (ref 4.7–6.1)
SODIUM SERPL-SCNC: 141 MMOL/L (ref 135–145)
TRIGL SERPL-MCNC: 72 MG/DL (ref 0–149)
TSH SERPL DL<=0.005 MIU/L-ACNC: 2.13 UIU/ML (ref 0.38–5.33)
VIT B12 SERPL-MCNC: 773 PG/ML (ref 211–911)
WBC # BLD AUTO: 4 K/UL (ref 4.8–10.8)

## 2024-03-01 PROCEDURE — 80061 LIPID PANEL: CPT

## 2024-03-01 PROCEDURE — 83036 HEMOGLOBIN GLYCOSYLATED A1C: CPT

## 2024-03-01 PROCEDURE — 82607 VITAMIN B-12: CPT

## 2024-03-01 PROCEDURE — 80053 COMPREHEN METABOLIC PANEL: CPT

## 2024-03-01 PROCEDURE — 84443 ASSAY THYROID STIM HORMONE: CPT

## 2024-03-01 PROCEDURE — 82306 VITAMIN D 25 HYDROXY: CPT

## 2024-03-01 PROCEDURE — 85025 COMPLETE CBC W/AUTO DIFF WBC: CPT

## 2024-03-01 PROCEDURE — 36415 COLL VENOUS BLD VENIPUNCTURE: CPT

## 2024-03-05 ENCOUNTER — OFFICE VISIT (OUTPATIENT)
Dept: MEDICAL GROUP | Facility: PHYSICIAN GROUP | Age: 84
End: 2024-03-05
Payer: MEDICARE

## 2024-03-05 VITALS
SYSTOLIC BLOOD PRESSURE: 134 MMHG | HEART RATE: 61 BPM | WEIGHT: 197.4 LBS | HEIGHT: 71 IN | DIASTOLIC BLOOD PRESSURE: 82 MMHG | OXYGEN SATURATION: 97 % | BODY MASS INDEX: 27.64 KG/M2 | RESPIRATION RATE: 16 BRPM | TEMPERATURE: 98.9 F

## 2024-03-05 DIAGNOSIS — E78.5 DYSLIPIDEMIA: ICD-10-CM

## 2024-03-05 DIAGNOSIS — D70.8 OTHER NEUTROPENIA (HCC): ICD-10-CM

## 2024-03-05 DIAGNOSIS — Z87.01 HISTORY OF PNEUMONIA: ICD-10-CM

## 2024-03-05 DIAGNOSIS — D69.6 THROMBOCYTOPENIA (HCC): ICD-10-CM

## 2024-03-05 PROBLEM — Z00.00 ENCOUNTER FOR MEDICAL EXAMINATION TO ESTABLISH CARE: Status: RESOLVED | Noted: 2024-02-05 | Resolved: 2024-03-05

## 2024-03-05 PROCEDURE — 3079F DIAST BP 80-89 MM HG: CPT | Performed by: FAMILY MEDICINE

## 2024-03-05 PROCEDURE — 99214 OFFICE O/P EST MOD 30 MIN: CPT | Performed by: FAMILY MEDICINE

## 2024-03-05 PROCEDURE — 3075F SYST BP GE 130 - 139MM HG: CPT | Performed by: FAMILY MEDICINE

## 2024-03-05 RX ORDER — ATORVASTATIN CALCIUM 20 MG/1
20 TABLET, FILM COATED ORAL NIGHTLY
Qty: 100 TABLET | Refills: 3 | Status: SHIPPED | OUTPATIENT
Start: 2024-03-05

## 2024-03-05 ASSESSMENT — PATIENT HEALTH QUESTIONNAIRE - PHQ9: CLINICAL INTERPRETATION OF PHQ2 SCORE: 0

## 2024-03-05 ASSESSMENT — ENCOUNTER SYMPTOMS
COUGH: 0
FALLS: 0
DIZZINESS: 0
FEVER: 0
SHORTNESS OF BREATH: 0

## 2024-03-05 ASSESSMENT — FIBROSIS 4 INDEX: FIB4 SCORE: 2.93

## 2024-03-05 NOTE — PROGRESS NOTES
Verbal consent was acquired by the patient to use [employer] ambient listening note generation during this visit         History of Present Illness  The patient presents for f/u visit    He was last seen 3 months ago. Since that appointment, he saw the cardiologist at the beginning of last month. He was in the emergency room about 2 weeks ago for community-acquired bacterial lung infection and pneumonia. Originally, he did not have any fever. He had a cough for several weeks and then in the third week, he started running a fever of 99 to 100, so he went to the ER. He was given Zithromax which he took for 5 days. He is feeling better and back to normal. He is starting to exercise again. He denies any coughing, difficulty breathing, or fevers.    He saw his cardiologist, Dr. Skinner last month. He was lifting 100 pounds.  His cardiologist was concerned about his blood pressure and wanted him to lift no more than 15 pounds because of his aneurysm in his back. He is taking losartan 12.5 mg every day. He stopped taking lisinopril years ago because his blood pressure was so low when he stood up and felt dizzy. His energy level has been good. He denies any fatigue. He rests for an hour during the day. He denies any swelling in his legs. He denies any trouble with balance or falling. He denies any dizziness.    He is taking atorvastatin 10 mg 2 tablets daily.   He has received 1 COVID-19 vaccine.He sees Dr. Sandoval once a year for his aneurysm.  He is waiting to hear from their office to schedule this appointment.      Review of Systems   Constitutional:  Negative for fever and malaise/fatigue.   Respiratory:  Negative for cough and shortness of breath.    Cardiovascular:  Negative for leg swelling.   Musculoskeletal:  Negative for falls.   Neurological:  Negative for dizziness.       Outpatient Medications Marked as Taking for the 3/5/24 encounter (Office Visit) with Shoshana Britt M.D.   Medication Sig Dispense Refill     "losartan (COZAAR) 25 MG Tab Take 0.5 Tablets by mouth every day. 30 Tablet 3    atorvastatin (LIPITOR) 10 MG Tab TAKE ONE TABLET BY MOUTH ONE TIME DAILY (Patient taking differently: Take 10 mg by mouth every day.) 100 Tablet 3    acetaminophen (TYLENOL) 650 MG CR tablet Take 650 mg by mouth every 6 hours as needed.      Cholecalciferol (VITAMIN D3) 2000 UNIT Cap Take 1 Capsule by mouth every day.      MAGNESIUM CITRATE PO Take  by mouth. Unable to recall dose - as needed         /82 (BP Location: Right arm, Patient Position: Sitting)   Pulse 61   Temp 37.2 °C (98.9 °F) (Temporal)   Resp 16   Ht 1.803 m (5' 11\")   Wt 89.5 kg (197 lb 6.4 oz)   SpO2 97% , Body mass index is 27.53 kg/m².        Physical Exam  Constitutional:       Appearance: Normal appearance.   Eyes:      Extraocular Movements: Extraocular movements intact.   Cardiovascular:      Rate and Rhythm: Normal rate and regular rhythm.   Pulmonary:      Effort: Pulmonary effort is normal.      Breath sounds: Normal breath sounds.   Neurological:      Mental Status: He is alert.   Psychiatric:         Mood and Affect: Mood normal.         Behavior: Behavior normal.           Assessment & Plan  1. Dyslipidemia,   chronic.  Recent labs with stable lipid panel. He will continue with atorvastatin 20 mg daily. He is requesting a new refill.    2. Hypertension, chronic.  This is stable. He is currently taking losartan 12.5 mg daily. He is following up with cardiology in 06/2024.    3. Other neutropenia (HCC).  4. Thrombocytopenia (HCC)  HCC Gap Form    Diagnosis to address: D70.8 - Other neutropenia (HCC)  Assessment and plan: Chronic, improving.  Recent labs with normal neutrophil counts  Diagnosis: D69.6 - Thrombocytopenia (HCC)  Assessment and plan: Chronic, improving.  Recent labs normal platelet counts  Last edited 03/05/24 15:01 PST by Shoshana Britt M.D.         5. History of pneumonia, improving.  He was recently seen in the emergency room and " diagnosed with atypical pneumonia. He completed a course of Z-Steven. He is feeling much better now. RSV vaccine discussed with him. Otherwise, he is up to date on vaccines.    Follow-up  The patient will follow up in 4 months.  No orders of the defined types were placed in this encounter.                This note was created using voice recognition software (Dragon). The accuracy of the dictation is limited by the abilities of the software. I have reviewed the note prior to signing, however some errors in grammar and context are still possible. If you have any questions related to this note please do not hesitate to contact our office.

## 2024-04-17 ENCOUNTER — TELEPHONE (OUTPATIENT)
Dept: HEALTH INFORMATION MANAGEMENT | Facility: OTHER | Age: 84
End: 2024-04-17
Payer: MEDICARE

## 2024-04-17 DIAGNOSIS — I72.3 ANEURYSM OF COMMON ILIAC ARTERY (HCC): ICD-10-CM

## 2024-04-17 NOTE — TELEPHONE ENCOUNTER
1. Caller Name: Michael Pradhan                          Call Back Number: [phone] (home)         How would the patient prefer to be contacted with a response: Intrapacehart message    2. SPECIFIC Action To Be Taken: Orders pending, please sign.    3. Diagnosis/Clinical Reason for Request: AAA ANNUAL MONITORING    4. Specialty & Provider Name/Lab/Imaging Location: DR MELISSA ARIAS. [facility]    5. Is appointment scheduled for requested order/referral: no    Patient was not informed they will receive a return phone call from the office ONLY if there are any questions before processing their request. Advised to call back if they haven't received a call from the referral department in 5 days.

## 2024-05-05 ASSESSMENT — PATIENT HEALTH QUESTIONNAIRE - PHQ9
2. FEELING DOWN, DEPRESSED, IRRITABLE, OR HOPELESS: SEVERAL DAYS
1. LITTLE INTEREST OR PLEASURE IN DOING THINGS: NOT AT ALL

## 2024-05-05 ASSESSMENT — ENCOUNTER SYMPTOMS: GENERAL WELL-BEING: GOOD

## 2024-05-05 ASSESSMENT — ACTIVITIES OF DAILY LIVING (ADL): BATHING_REQUIRES_ASSISTANCE: 0

## 2024-05-07 NOTE — ASSESSMENT & PLAN NOTE
Chronic, stable. Last lipid panel in March 2024 was WNL. He currently takes atorvastatin 20 mg daily. We discussed his dietary/lifestyle regimen. Follow up with PCP for continued monitoring and management.

## 2024-05-07 NOTE — ASSESSMENT & PLAN NOTE
Stable issue. He has had an ANETA done which was normal. Denies claudication but does endorse cold extremeties. Follows with vascular.

## 2024-05-07 NOTE — ASSESSMENT & PLAN NOTE
Chronic, stable. Found on prior imaging in 2023. Patient currently takes atorvastatin 20 mg daily. Follow up with PCP for continued monitoring.

## 2024-05-07 NOTE — ASSESSMENT & PLAN NOTE
Chronic, stable. Most recent CBC revealed WBC of 4.0. Denies recurrent infections. Follow up with PCP at least annually for continued monitoring and management.

## 2024-05-07 NOTE — ASSESSMENT & PLAN NOTE
Chronic, stable. Noted on prior imaging in 2022. Revealed dilation of 2.1 cm. Has seen vascular surgery. Follow up with PCP for continued monitoring.

## 2024-05-08 ENCOUNTER — OFFICE VISIT (OUTPATIENT)
Dept: FAMILY PLANNING/WOMEN'S HEALTH CLINIC | Facility: PHYSICIAN GROUP | Age: 84
End: 2024-05-08
Payer: MEDICARE

## 2024-05-08 VITALS
SYSTOLIC BLOOD PRESSURE: 122 MMHG | HEIGHT: 69 IN | BODY MASS INDEX: 28.88 KG/M2 | WEIGHT: 195 LBS | DIASTOLIC BLOOD PRESSURE: 70 MMHG

## 2024-05-08 DIAGNOSIS — I72.3 ANEURYSM OF COMMON ILIAC ARTERY (HCC): ICD-10-CM

## 2024-05-08 DIAGNOSIS — D70.8 OTHER NEUTROPENIA (HCC): ICD-10-CM

## 2024-05-08 DIAGNOSIS — E78.5 DYSLIPIDEMIA: ICD-10-CM

## 2024-05-08 DIAGNOSIS — I77.9 DISORDER OF ARTERIES AND ARTERIOLES, UNSPECIFIED (HCC): ICD-10-CM

## 2024-05-08 DIAGNOSIS — I70.0 ATHEROSCLEROSIS OF ABDOMINAL AORTA (HCC): ICD-10-CM

## 2024-05-08 PROCEDURE — G0439 PPPS, SUBSEQ VISIT: HCPCS

## 2024-05-08 PROCEDURE — 1126F AMNT PAIN NOTED NONE PRSNT: CPT

## 2024-05-08 PROCEDURE — 3074F SYST BP LT 130 MM HG: CPT

## 2024-05-08 PROCEDURE — 3078F DIAST BP <80 MM HG: CPT

## 2024-05-08 SDOH — ECONOMIC STABILITY: INCOME INSECURITY: HOW HARD IS IT FOR YOU TO PAY FOR THE VERY BASICS LIKE FOOD, HOUSING, MEDICAL CARE, AND HEATING?: NOT HARD AT ALL

## 2024-05-08 SDOH — ECONOMIC STABILITY: INCOME INSECURITY: IN THE LAST 12 MONTHS, WAS THERE A TIME WHEN YOU WERE NOT ABLE TO PAY THE MORTGAGE OR RENT ON TIME?: NO

## 2024-05-08 SDOH — ECONOMIC STABILITY: HOUSING INSECURITY: IN THE LAST 12 MONTHS, HOW MANY PLACES HAVE YOU LIVED?: 1

## 2024-05-08 SDOH — ECONOMIC STABILITY: HOUSING INSECURITY
IN THE LAST 12 MONTHS, WAS THERE A TIME WHEN YOU DID NOT HAVE A STEADY PLACE TO SLEEP OR SLEPT IN A SHELTER (INCLUDING NOW)?: NO

## 2024-05-08 SDOH — ECONOMIC STABILITY: FOOD INSECURITY: WITHIN THE PAST 12 MONTHS, YOU WORRIED THAT YOUR FOOD WOULD RUN OUT BEFORE YOU GOT MONEY TO BUY MORE.: NEVER TRUE

## 2024-05-08 SDOH — ECONOMIC STABILITY: FOOD INSECURITY: WITHIN THE PAST 12 MONTHS, THE FOOD YOU BOUGHT JUST DIDN'T LAST AND YOU DIDN'T HAVE MONEY TO GET MORE.: NEVER TRUE

## 2024-05-08 SDOH — ECONOMIC STABILITY: TRANSPORTATION INSECURITY
IN THE PAST 12 MONTHS, HAS LACK OF TRANSPORTATION KEPT YOU FROM MEETINGS, WORK, OR FROM GETTING THINGS NEEDED FOR DAILY LIVING?: NO

## 2024-05-08 SDOH — ECONOMIC STABILITY: TRANSPORTATION INSECURITY
IN THE PAST 12 MONTHS, HAS THE LACK OF TRANSPORTATION KEPT YOU FROM MEDICAL APPOINTMENTS OR FROM GETTING MEDICATIONS?: NO

## 2024-05-08 ASSESSMENT — FIBROSIS 4 INDEX: FIB4 SCORE: 2.93

## 2024-05-08 ASSESSMENT — PATIENT HEALTH QUESTIONNAIRE - PHQ9
5. POOR APPETITE OR OVEREATING: 0 - NOT AT ALL
SUM OF ALL RESPONSES TO PHQ QUESTIONS 1-9: 2
CLINICAL INTERPRETATION OF PHQ2 SCORE: 1

## 2024-05-08 ASSESSMENT — PAIN SCALES - GENERAL: PAINLEVEL: NO PAIN

## 2024-05-08 NOTE — PROGRESS NOTES
Comprehensive Health Assessment Program     Michael Pradhan is a 84 y.o. here for his comprehensive health assessment.    Patient Active Problem List    Diagnosis Date Noted    History of pneumonia 2024    Elevated glucose 2023    Chronic pain of both knees 2023    Vasovagal syncope 2023    Atherosclerosis of abdominal aorta (HCC) 2023    Thrombocytopenia (HCC) 2023    Other neutropenia (HCC) 2023    Aneurysm of common iliac artery (HCC) 2022    ANGUS positive 2021    Vitamin D deficiency 2021    History of TIA (transient ischemic attack) 2021    History of hypertension 2021    Nonspecific abnormal result of function study of peripheral nervous system and special senses 2021    Disorder of arteries and arterioles, unspecified (HCC) 2021    Low back pain 2021    Heart murmur 2019    H/O bee sting allergy 2017    Dyslipidemia 2017       Current Outpatient Medications   Medication Sig Dispense Refill    atorvastatin (LIPITOR) 20 MG Tab Take 1 Tablet by mouth every evening. 100 Tablet 3    losartan (COZAAR) 25 MG Tab Take 0.5 Tablets by mouth every day. 30 Tablet 3    acetaminophen (TYLENOL) 650 MG CR tablet Take 650 mg by mouth every 6 hours as needed.      Cholecalciferol (VITAMIN D3) 2000 UNIT Cap Take 1 Capsule by mouth every day.      MAGNESIUM CITRATE PO Take  by mouth. Unable to recall dose - as needed       No current facility-administered medications for this visit.          Current supplements as per medication list.     Allergies:   Bee  Social History     Tobacco Use    Smoking status: Former     Current packs/day: 0.00     Average packs/day: 1 pack/day for 5.0 years (5.0 ttl pk-yrs)     Types: Cigarettes     Start date: 1964     Quit date: 1969     Years since quittin.2    Smokeless tobacco: Never    Tobacco comments:     Smoked for about 4-5 ys and quit in .   Vaping Use     Vaping Use: Never used   Substance Use Topics    Alcohol use: Yes     Alcohol/week: 0.0 - 1.2 oz     Comment: a month    Drug use: No     Family History   Problem Relation Age of Onset    Diabetes Mother     Hypertension Father     Diabetes Sister     Diabetes Sister     Other Brother         Spinal Miningitis    Schizophrenia Brother     Cancer Brother         Esophigial    Dementia Maternal Grandmother     Dementia Paternal Grandmother     Colon Cancer Paternal Grandfather      Michael  has a past medical history of Arthritis, BMI 27.0-27.9,adult (05/27/2021), Encounter for medical examination to establish care, Heart burn, High cholesterol, Hyperlipidemia, Hypertension, Rhinorrhea (01/31/2023), and suspected TIA (2018).   Past Surgical History:   Procedure Laterality Date    INGUINAL HERNIA LAPAROSCOPIC Bilateral 1/22/2020    Procedure: REPAIR, HERNIA, INGUINAL, LAPAROSCOPIC- WITH MESH;  Surgeon: Niall Linares M.D.;  Location: SURGERY [facility];  Service: General    COLONOSCOPY      TONSILLECTOMY         Screening:  In the last six months have you experienced any leakage of urine? No    Depression Screening  Little interest or pleasure in doing things?  0 - not at all   Feeling down, depressed , or hopeless? 1 - several days   Trouble falling or staying asleep, or sleeping too much?  1 - several days   Feeling tired or having little energy?  0 - not at all   Poor appetite or overeating?  0 - not at all   Feeling bad about yourself - or that you are a failure or have let yourself or your family down? 0 - not at all   Trouble concentrating on things, such as reading the newspaper or watching television? 0 - not at all   Moving or speaking so slowly that other people could have noticed.  Or the opposite - being so fidgety or restless that you have been moving around a lot more than usual?  0 - not at all   Thoughts that you would be better off dead, or of hurting yourself?  0 - not at all   Patient Health  Questionnaire Score: 2     Interpretation of PHQ-9 Total Score   Score Severity   1-4 No Depression   5-9 Mild Depression   10-14 Moderate Depression   15-19 Moderately Severe Depression   20-27 Severe Depression    If depressive symptoms identified deferred to follow up visit unless specifically addressed in assesment and plan.      Screening for Cognitive Impairment  Do you or any of your friends or family members have any concern about your memory? No  Three Minute Recall (Leader, Season, Table) 3/3    Jamaal clock face with all 12 numbers and set the hands to show 10 minutes after 11.  Yes    Cognitive concerns identified deferred for follow up unless specifically addressed in assessment and plan.    Fall Risk Assessment  Has the patient had two or more falls in the last year or any fall with injury in the last year?  No    Safety Assessment  Do you always wear your seatbelt?  Yes  Any changes to home needed to function safely? No  Difficulty hearing.  No  Patient counseled about all safety risks that were identified.    Functional Assessment ADLs  Are there any barriers preventing you from cooking for yourself or meeting nutritional needs?  No.    Are there any barriers preventing you from driving safely or obtaining transportation?  No.    Are there any barriers preventing you from using a telephone or calling for help?  No    Are there any barriers preventing you from shopping?  No.    Are there any barriers preventing you from taking care of your own finances?  No    Are there any barriers preventing you from managing your medications?  No    Are there any barriers preventing you from showering, bathing or dressing yourself? No    Are there any barriers preventing you from doing housework or laundry? No  Are there any barriers preventing you from using the toilet?No  Are you currently engaging in any exercise or physical activity?  Yes.  Lift weights 3x week, walks 2 hours 3x week.     Self-Assessment of  Health  What is your perception of your health? Good  Do you sleep more than six hours a night? Yes  In the past 7 days, how much did pain keep you from doing your normal work? None  Do you spend quality time with family or friends (virtually or in person)? No  Do you usually eat a heart healthy diet that constists of a variety of fruits, vegetables, whole grains and fiber? Yes  Do you eat foods high in fat and/or Fast Food more than three times per week? No    Advance Care Planning  Do you have an Advance Directive, Living Will, Durable Power of , or POLST? Yes      Durable Power of    is not on file - instructed patient to bring in a copy to scan into their chart      Health Maintenance Summary            Annual Wellness Visit (Yearly) Next due on 5/8/2025 05/08/2024  Level of Service: [place] ANNUAL WELLNESS VISIT-INCLUDES PPPS SUBSEQUE*    01/20/2023  Level of Service: [place] ANNUAL WELLNESS VISIT-INCLUDES PPPS SUBSEQUE*    01/13/2022  Level of Service: [place] ANNUAL WELLNESS VISIT-INCLUDES PPPS SUBSEQUE*    05/27/2021  Level of Service: [place] ANNUAL WELLNESS VISIT-INCLUDES PPPS SUBSEQUE*    05/16/2019  Subsequent Annual Wellness Visit - Includes PPPS ()    Only the first 5 history entries have been loaded, but more history exists.              IMM DTaP/Tdap/Td Vaccine (2 - Td or Tdap) Next due on 7/24/2027 07/24/2017  Imm Admin: Tdap Vaccine              Pneumococcal Vaccine: 65+ Years (Series Information) Completed      10/10/2015  Imm Admin: Pneumococcal polysaccharide vaccine (PPSV-23)    07/22/2015  Imm Admin: Pneumococcal Conjugate Vaccine (Prevnar/PCV-13)    02/05/2012  Imm Admin: Pneumococcal Conjugate Vaccine (Prevnar/PCV-13)              Zoster (Shingles) Vaccines (Series Information) Completed      01/20/2022  Imm Admin: Zoster Vaccine Recombinant (RZV) (SHINGRIX)    10/22/2021  Imm Admin: Zoster Vaccine Recombinant (RZV) (SHINGRIX)    03/07/2013  Imm Admin: Zoster Vaccine Live (ZVL)  (Zostavax) - HISTORICAL DATA              Influenza Vaccine (Series Information) Completed      09/25/2023  Imm Admin: Influenza Vaccine, Quadrivalent, Adjuvanted (Pf)    09/12/2022  Imm Admin: Influenza, Unspecified - HISTORICAL DATA    09/02/2021  Imm Admin: Influenza Vaccine Quad Inj (Pf)    09/29/2020  Imm Admin: Influenza, Unspecified - HISTORICAL DATA    10/16/2019  Imm Admin: Influenza, Unspecified - HISTORICAL DATA    Only the first 5 history entries have been loaded, but more history exists.              COVID-19 Vaccine (Series Information) Completed      09/27/2023  Imm Admin: Comirnaty (Covid-19 Vaccine, Mrna, [id number] Formula)    05/09/2023  Imm Admin: PFIZER BIVALENT SARS-COV-2 VACCINE (12+)    09/12/2022  Imm Admin: PFIZER BIVALENT SARS-COV-2 VACCINE (12+)    04/04/2022  Imm Admin: PFIZER RENDON CAP SARS-COV-2 VACCINATION (12+)    10/02/2021  Imm Admin: PFIZER PURPLE CAP SARS-COV-2 VACCINATION (12+)    Only the first 5 history entries have been loaded, but more history exists.              Hepatitis A Vaccine (Hep A) (Series Information) Aged Out      No completion history exists for this topic.              Hepatitis B Vaccine (Hep B) (Series Information) Aged Out      No completion history exists for this topic.              HPV Vaccines (Series Information) Aged Out      No completion history exists for this topic.              Polio Vaccine (Inactivated Polio) (Series Information) Aged Out      No completion history exists for this topic.              Meningococcal Immunization (Series Information) Aged Out      No completion history exists for this topic.              Discontinued - Colorectal Cancer Screening  Discontinued        Frequency changed to Never automatically (Topic No Longer Applies)    03/08/2022  COLOGUARD COLON CANCER SCREENING    02/09/2016  Colonoscopy (Done)                    Patient Care Team:  Shoshana Britt M.D. as PCP - General (Family Medicine)  Gale Ruiz as Contact  "Center Specialist   [facility] [place] as Consulting Physician  Dao Mock M.D. as Consulting Physician (Ophthalmology)  Gurmeet Daugherty M.D. (Surgery)  Re Almendarez M.D. (Rheumatology)      Financial Resource Strain: Low Risk  (5/8/2024)    Overall Financial Resource Strain (CARDIA)     Difficulty of Paying Living Expenses: Not hard at all      Transportation Needs: No Transportation Needs (5/8/2024)    PRAPARE - Transportation     Lack of Transportation (Medical): No     Lack of Transportation (Non-Medical): No      Food Insecurity: No Food Insecurity (5/8/2024)    Hunger Vital Sign     Worried About Running Out of Food in the Last Year: Never true     Ran Out of Food in the Last Year: Never true        Encounter Vitals  Blood Pressure : 122/70  Weight: 88.5 kg (195 lb)  Height: 175.3 cm (5' 9\")  BMI (Calculated): 28.8  Pain Score: No pain     Alert, oriented in no acute distress.  Eye contact is good, speech goal directed, affect calm.    Assessment and Plan. The following treatment and monitoring plan is recommended:  Aneurysm of common iliac artery (HCC)  Chronic, stable. Noted on prior imaging in 2022. Revealed dilation of 2.1 cm. Has seen vascular surgery. Follow up with PCP for continued monitoring.      Atherosclerosis of abdominal aorta (HCC)  Chronic, stable. Found on prior imaging in 2023. Patient currently takes atorvastatin 20 mg daily. Follow up with PCP for continued monitoring.      Disorder of arteries and arterioles, unspecified (HCC)  Stable issue. He has had an ANETA done which was normal. Denies claudication but does endorse cold extremeties. Follows with vascular.     Dyslipidemia  Chronic, stable. Last lipid panel in March 2024 was WNL. He currently takes atorvastatin 20 mg daily. We discussed his dietary/lifestyle regimen. Follow up with PCP for continued monitoring and management.      Other neutropenia (HCC)  Chronic, stable. Most recent CBC revealed WBC of 4.0. " Denies recurrent infections. Follow up with PCP at least annually for continued monitoring and management.      Services suggested: No services needed at this time  Health Care Screening: Age-appropriate preventive services recommended by USPTF and ACIP covered by Medicare were discussed today. Services ordered if indicated and agreed upon by the patient.  Referrals offered: Community-based lifestyle interventions to reduce health risks and promote self-management and wellness, fall prevention, nutrition, physical activity, tobacco-use cessation, weight loss, and mental health services as per orders if indicated.    Discussion today about general wellness and lifestyle habits:    Prevent falls and reduce trip hazards; Cautioned about securing or removing rugs.  Have a working fire alarm and carbon monoxide detector.  Engage in regular physical activity and social activities.    Follow-up: Return for appointment with Primary Care Provider as needed..

## 2024-06-24 ENCOUNTER — OFFICE VISIT (OUTPATIENT)
Dept: CARDIOLOGY | Facility: MEDICAL CENTER | Age: 84
End: 2024-06-24
Attending: INTERNAL MEDICINE
Payer: MEDICARE

## 2024-06-24 VITALS
RESPIRATION RATE: 12 BRPM | BODY MASS INDEX: 29.53 KG/M2 | DIASTOLIC BLOOD PRESSURE: 80 MMHG | HEART RATE: 58 BPM | SYSTOLIC BLOOD PRESSURE: 126 MMHG | OXYGEN SATURATION: 98 % | WEIGHT: 199.4 LBS | HEIGHT: 69 IN

## 2024-06-24 DIAGNOSIS — Z86.73 HISTORY OF TIA (TRANSIENT ISCHEMIC ATTACK): ICD-10-CM

## 2024-06-24 DIAGNOSIS — I72.3 ANEURYSM OF COMMON ILIAC ARTERY (HCC): ICD-10-CM

## 2024-06-24 DIAGNOSIS — E78.5 DYSLIPIDEMIA: ICD-10-CM

## 2024-06-24 DIAGNOSIS — Z86.79 HISTORY OF HYPERTENSION: ICD-10-CM

## 2024-06-24 PROCEDURE — 99214 OFFICE O/P EST MOD 30 MIN: CPT | Performed by: INTERNAL MEDICINE

## 2024-06-24 PROCEDURE — 99211 OFF/OP EST MAY X REQ PHY/QHP: CPT | Performed by: INTERNAL MEDICINE

## 2024-06-24 PROCEDURE — 3074F SYST BP LT 130 MM HG: CPT | Performed by: INTERNAL MEDICINE

## 2024-06-24 PROCEDURE — 3079F DIAST BP 80-89 MM HG: CPT | Performed by: INTERNAL MEDICINE

## 2024-06-24 RX ORDER — LOSARTAN POTASSIUM 25 MG/1
25 TABLET ORAL DAILY
Qty: 90 TABLET | Refills: 3 | Status: SHIPPED | OUTPATIENT
Start: 2024-06-24

## 2024-06-24 ASSESSMENT — FIBROSIS 4 INDEX: FIB4 SCORE: 2.93

## 2024-06-24 ASSESSMENT — ENCOUNTER SYMPTOMS
COUGH: 0
DEPRESSION: 0
BLURRED VISION: 0
ORTHOPNEA: 0
BACK PAIN: 0
WEIGHT LOSS: 0
NEAR-SYNCOPE: 0
WEIGHT GAIN: 0
PALPITATIONS: 0
PND: 0
ALTERED MENTAL STATUS: 0
HEARTBURN: 0
CONSTIPATION: 0
SYNCOPE: 0
ABDOMINAL PAIN: 0
FEVER: 0
FLANK PAIN: 0
NAUSEA: 0
DIARRHEA: 0
DYSPNEA ON EXERTION: 0
DIZZINESS: 0
SHORTNESS OF BREATH: 0
CLAUDICATION: 0
IRREGULAR HEARTBEAT: 0
VOMITING: 0
DECREASED APPETITE: 0

## 2024-06-24 NOTE — PROGRESS NOTES
Cardiology Note    Chief Complaint   Patient presents with    Follow-Up    Heart Murmur       History of Present Illness: Michael Pradhan is a 84 y.o. male who presents for follow up visit.    Continues to feel well. Describes visited with his vascular surgery Dr Love who noted increase in aneurysm on latest imaging. Was planning intervention however due to insurance issues referred to [facility]. No cardiac complaints. Compliant with medications and denies adverse effects. Has noted elevated blood pressure at home.     Review of Systems   Constitutional: Negative for decreased appetite, fever, malaise/fatigue, weight gain and weight loss.   HENT:  Negative for congestion and nosebleeds.    Eyes:  Negative for blurred vision.   Cardiovascular:  Negative for chest pain, claudication, dyspnea on exertion, irregular heartbeat, leg swelling, near-syncope, orthopnea, palpitations, paroxysmal nocturnal dyspnea and syncope.   Respiratory:  Negative for cough and shortness of breath.    Endocrine: Negative for cold intolerance and heat intolerance.   Skin:  Negative for rash.   Musculoskeletal:  Negative for back pain.   Gastrointestinal:  Negative for abdominal pain, constipation, diarrhea, heartburn, melena, nausea and vomiting.   Genitourinary:  Negative for dysuria, flank pain and hematuria.   Neurological:  Negative for dizziness.   Psychiatric/Behavioral:  Negative for altered mental status and depression.          Past Medical History:   Diagnosis Date    Arthritis     arms and legs    BMI 27.0-27.9,adult 05/27/2021    Encounter for medical examination to establish care     Heart burn     Gerd    High cholesterol     Hyperlipidemia     Hypertension     Rhinorrhea 01/31/2023    suspected TIA 2018    no deficits         Past Surgical History:   Procedure Laterality Date    INGUINAL HERNIA LAPAROSCOPIC Bilateral 1/22/2020    Procedure: REPAIR, HERNIA, INGUINAL, LAPAROSCOPIC- WITH MESH;  Surgeon: Niall Linares M.D.;   Location: SURGERY [facility];  Service: General    COLONOSCOPY      TONSILLECTOMY           Current Outpatient Medications   Medication Sig Dispense Refill    losartan (COZAAR) 25 MG Tab Take 1 Tablet by mouth every day. 90 Tablet 3    atorvastatin (LIPITOR) 20 MG Tab Take 1 Tablet by mouth every evening. 100 Tablet 3    acetaminophen (TYLENOL) 650 MG CR tablet Take 650 mg by mouth every 6 hours as needed.      Cholecalciferol (VITAMIN D3) 2000 UNIT Cap Take 1 Capsule by mouth every day.      MAGNESIUM CITRATE PO Take  by mouth. Unable to recall dose - as needed       No current facility-administered medications for this visit.         Allergies   Allergen Reactions    Bee Hives and Swelling         Family History   Problem Relation Age of Onset    Diabetes Mother     Hypertension Father     Diabetes Sister     Diabetes Sister     Other Brother         Spinal Miningitis    Schizophrenia Brother     Cancer Brother         Esophigial    Dementia Maternal Grandmother     Dementia Paternal Grandmother     Colon Cancer Paternal Grandfather          Social History     Socioeconomic History    Marital status:      Spouse name: Not on file    Number of children: Not on file    Years of education: Not on file    Highest education level: Not on file   Occupational History    Not on file   Tobacco Use    Smoking status: Former     Current packs/day: 0.00     Average packs/day: 1 pack/day for 5.0 years (5.0 ttl pk-yrs)     Types: Cigarettes     Start date: 1964     Quit date: 1969     Years since quittin.4    Smokeless tobacco: Never    Tobacco comments:     Smoked for about 4-5 ys and quit in .   Vaping Use    Vaping status: Never Used   Substance and Sexual Activity    Alcohol use: Yes     Alcohol/week: 0.0 - 1.2 oz     Comment: a month    Drug use: No    Sexual activity: Yes     Partners: Female   Other Topics Concern    Not on file   Social History Narrative    He is originally from [place], his wife  "  (she had autoimmune disease and lung and liver problems). They did not have children. His in-laws live in the [place] Area. He hasn't seen his siblings in years. He walks twice a day,lives in an apartment now, tried [employer] for 6 months. Is looking forward to more social opportunities. 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 He used to enjoy crossword puzzles but struggles with his vision now.     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      Social Determinants of Health     Financial Resource Strain: Low Risk  (2024)    Overall Financial Resource Strain (CARDIA)     Difficulty of Paying Living Expenses: Not hard at all   Food Insecurity: No Food Insecurity (2024)    Hunger Vital Sign     Worried About Running Out of Food in the Last Year: Never true     Ran Out of Food in the Last Year: Never true   Transportation Needs: No Transportation Needs (2024)    PRAPARE - Transportation     Lack of Transportation (Medical): No     Lack of Transportation (Non-Medical): No   Physical Activity: Not on file   Stress: Not on file   Social Connections: Not on file   Intimate Partner Violence: Not on file   Housing Stability: Low Risk  (2024)    Housing Stability Vital Sign     Unable to Pay for Housing in the Last Year: No     Number of Places Lived in the Last Year: 1     Unstable Housing in the Last Year: No         Physical Exam:  Ambulatory Vitals  /80 (BP Location: Left arm, Patient Position: Sitting, BP Cuff Size: Adult)   Pulse (!) 58   Resp 12   Ht 1.753 m (5' 9\")   Wt 90.4 kg (199 lb 6.4 oz)   SpO2 98%    BP Readings from Last 4 Encounters:   24 126/80   24 122/70   24 134/82   24 (!) 173/81     Weight/BMI:   Vitals:    24 0850   BP: 126/80   Weight: 90.4 kg (199 lb 6.4 oz)   Height: 1.753 m (5' 9\")    Body mass index is 29.45 kg/m².  Wt Readings from Last 4 Encounters: "   06/24/24 90.4 kg (199 lb 6.4 oz)   05/08/24 88.5 kg (195 lb)   03/05/24 89.5 kg (197 lb 6.4 oz)   02/21/24 88 kg (194 lb 0.1 oz)       Physical Exam  Constitutional:       General: He is not in acute distress.  HENT:      Head: Normocephalic and atraumatic.   Eyes:      Conjunctiva/sclera: Conjunctivae normal.      Pupils: Pupils are equal, round, and reactive to light.   Neck:      Vascular: No JVD.   Cardiovascular:      Rate and Rhythm: Normal rate and regular rhythm.      Heart sounds: Normal heart sounds. No murmur heard.     No friction rub. No gallop.   Pulmonary:      Effort: Pulmonary effort is normal. No respiratory distress.      Breath sounds: Normal breath sounds. No wheezing or rales.   Chest:      Chest wall: No tenderness.   Abdominal:      General: Bowel sounds are normal. There is no distension.      Palpations: Abdomen is soft.   Musculoskeletal:      Cervical back: Normal range of motion and neck supple.   Skin:     General: Skin is warm and dry.   Neurological:      Mental Status: He is alert and oriented to person, place, and time.   Psychiatric:         Mood and Affect: Affect normal.         Judgment: Judgment normal.         Lab Data Review:  Lab Results   Component Value Date/Time    CHOLSTRLTOT 110 03/01/2024 08:46 AM    LDL 56 03/01/2024 08:46 AM    HDL 40 03/01/2024 08:46 AM    TRIGLYCERIDE 72 03/01/2024 08:46 AM       Lab Results   Component Value Date/Time    SODIUM 141 03/01/2024 08:46 AM    POTASSIUM 4.3 03/01/2024 08:46 AM    CHLORIDE 105 03/01/2024 08:46 AM    CO2 28 03/01/2024 08:46 AM    GLUCOSE 96 03/01/2024 08:46 AM    BUN 20 03/01/2024 08:46 AM    CREATININE 0.95 03/01/2024 08:46 AM     CrCl cannot be calculated (Patient's most recent lab result is older than the maximum 7 days allowed.).  Lab Results   Component Value Date/Time    ALKPHOSPHAT 61 03/01/2024 08:46 AM    ASTSGOT 27 03/01/2024 08:46 AM    ALTSGPT 21 03/01/2024 08:46 AM    TBILIRUBIN 0.9 03/01/2024 08:46 AM     "  Lab Results   Component Value Date/Time    WBC 4.0 (L) 03/01/2024 08:46 AM     Lab Results   Component Value Date/Time    HBA1C 5.6 03/01/2024 08:46 AM     No components found for: \"TROP\"      Cardiac Imaging and Procedures Review:      EKG 2/5/24 interpreted by me sinus felix 58 bpm    CTA head 7/2018  IMPRESSION:  1.  No evidence of intracranial vascular occlusion or aneurysm.  2.  Diminutive right vertebral artery which terminates low the level of the basilar artery.    CTA neck 7/2018  IMPRESSION:  1.  Patent carotid and vertebral arteries without evidence of occlusion or dissection.  2.  Minimal atherosclerotic plaque at the left carotid bifurcation.  3.  Diminutive right vertebral artery which terminates below the level of the basilar artery.    MRI brain 7/2018  IMPRESSION:  1.  Moderate supratentorial white matter disease most consistent with microvascular ischemic change.  2.  No evidence of acute infarction, hemorrhage, or mass lesion.    Mpi spect 7/2018   NUCLEAR IMAGING INTERPRETATION    Physiologic apical thinning versus small infarct.    No reversible ischemia.    Normal LEFT ventricular function.    ECG INTERPRETATION    Negative stress ECG for ischemia.     TTE 5/11/23  CONCLUSIONS  The left ventricle is small in size.  The left ventricular ejection fraction is visually estimated to be 60%.  Moderately dilated right ventricle.  Normal right ventricular systolic function.  Normal inferior vena cava size and inspiratory collapse.  Borderline aortic valve stenosis.  Mild aortic insufficiency.  Right ventricular systolic pressure is estimated to be  30  mmHg.    4/2023 aorta/iliac   CONCLUSIONS   No evidence of abdominal aortic aneurysm.    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       Medical Decision Making:  Problem List Items Addressed This Visit       Dyslipidemia    History of TIA (transient ischemic attack)    History of hypertension    Relevant " Medications    losartan (COZAAR) 25 MG Tab    Aneurysm of common iliac artery (HCC)    Relevant Medications    losartan (COZAAR) 25 MG Tab    Other Relevant Orders    Referral to Vascular Medicine    CTA ABDOMEN PELVIS W & W/O POST PROCESS     mild AR - serial imaging 2026.     HLD / aortic atherosclerosis / hx TIA- continue statin. Threshold goal LDL <70. Controlled.    Aneurysm iliac - titrate losartan. Goal blood pressure <130/80. Return to office two weeks for BP check. Avoid heavy weight lifting. Serial imaging obtain from vascular office. Continue follow up with vascular surgery at [facility]. Check CTA.     It was my pleasure to meet with Mr. Pradhan.

## 2024-06-26 ENCOUNTER — TELEPHONE (OUTPATIENT)
Dept: HEALTH INFORMATION MANAGEMENT | Facility: OTHER | Age: 84
End: 2024-06-26
Payer: MEDICARE

## 2024-06-26 ENCOUNTER — DOCUMENTATION (OUTPATIENT)
Dept: VASCULAR LAB | Facility: MEDICAL CENTER | Age: 84
End: 2024-06-26
Payer: MEDICARE

## 2024-06-26 NOTE — PROGRESS NOTES
Patient referred to vascular care  Unfortunately when reached by our schedulers patient refused to make appt for initial visit  Unless patient establishes with a face-to-face visit in our office will be unable to take part in care  Await further patient contact.  Pending further contact, we will defer all further management of vascular disease and its risk factors to PCP and/or referring MD.    Michael J. Bloch, MD  Vascular Care    [facility]c  [facility]

## 2024-06-28 DIAGNOSIS — I72.3 ANEURYSM OF COMMON ILIAC ARTERY (HCC): ICD-10-CM

## 2024-07-01 ENCOUNTER — HOSPITAL ENCOUNTER (OUTPATIENT)
Dept: LAB | Facility: MEDICAL CENTER | Age: 84
End: 2024-07-01
Attending: INTERNAL MEDICINE
Payer: MEDICARE

## 2024-07-01 DIAGNOSIS — I72.3 ANEURYSM OF COMMON ILIAC ARTERY (HCC): ICD-10-CM

## 2024-07-01 LAB
ANION GAP SERPL CALC-SCNC: 10 MMOL/L (ref 7–16)
BUN SERPL-MCNC: 23 MG/DL (ref 8–22)
CALCIUM SERPL-MCNC: 9.1 MG/DL (ref 8.5–10.5)
CHLORIDE SERPL-SCNC: 108 MMOL/L (ref 96–112)
CO2 SERPL-SCNC: 25 MMOL/L (ref 20–33)
CREAT SERPL-MCNC: 0.93 MG/DL (ref 0.5–1.4)
FASTING STATUS PATIENT QL REPORTED: NORMAL
GFR SERPLBLD CREATININE-BSD FMLA CKD-EPI: 82 ML/MIN/1.73 M 2
GLUCOSE SERPL-MCNC: 97 MG/DL (ref 65–99)
POTASSIUM SERPL-SCNC: 4.6 MMOL/L (ref 3.6–5.5)
SODIUM SERPL-SCNC: 143 MMOL/L (ref 135–145)

## 2024-07-01 PROCEDURE — 80048 BASIC METABOLIC PNL TOTAL CA: CPT

## 2024-07-01 PROCEDURE — 36415 COLL VENOUS BLD VENIPUNCTURE: CPT

## 2024-07-03 ENCOUNTER — HOSPITAL ENCOUNTER (OUTPATIENT)
Dept: RADIOLOGY | Facility: MEDICAL CENTER | Age: 84
End: 2024-07-03
Attending: INTERNAL MEDICINE
Payer: MEDICARE

## 2024-07-03 DIAGNOSIS — I72.3 ANEURYSM OF COMMON ILIAC ARTERY (HCC): ICD-10-CM

## 2024-07-03 PROCEDURE — 75635 CT ANGIO ABDOMINAL ARTERIES: CPT

## 2024-07-03 PROCEDURE — 700117 HCHG RX CONTRAST REV CODE 255: Performed by: INTERNAL MEDICINE

## 2024-07-03 RX ADMIN — IOHEXOL 100 ML: 350 INJECTION, SOLUTION INTRAVENOUS at 09:43

## 2024-07-17 ENCOUNTER — HOSPITAL ENCOUNTER (OUTPATIENT)
Dept: LAB | Facility: MEDICAL CENTER | Age: 84
End: 2024-07-17
Attending: FAMILY MEDICINE
Payer: MEDICARE

## 2024-07-17 DIAGNOSIS — E78.5 DYSLIPIDEMIA: ICD-10-CM

## 2024-07-17 LAB
BASOPHILS # BLD AUTO: 0.9 % (ref 0–1.8)
BASOPHILS # BLD: 0.06 K/UL (ref 0–0.12)
EOSINOPHIL # BLD AUTO: 0 K/UL (ref 0–0.51)
EOSINOPHIL NFR BLD: 0 % (ref 0–6.9)
ERYTHROCYTE [DISTWIDTH] IN BLOOD BY AUTOMATED COUNT: 45.1 FL (ref 35.9–50)
HCT VFR BLD AUTO: 43.9 % (ref 42–52)
HGB BLD-MCNC: 15.4 G/DL (ref 14–18)
LYMPHOCYTES # BLD AUTO: 1.04 K/UL (ref 1–4.8)
LYMPHOCYTES NFR BLD: 16.7 % (ref 22–41)
MANUAL DIFF BLD: NORMAL
MCH RBC QN AUTO: 34.5 PG (ref 27–33)
MCHC RBC AUTO-ENTMCNC: 35.1 G/DL (ref 32.3–36.5)
MCV RBC AUTO: 98.2 FL (ref 81.4–97.8)
MONOCYTES # BLD AUTO: 0.81 K/UL (ref 0–0.85)
MONOCYTES NFR BLD AUTO: 13.1 % (ref 0–13.4)
MORPHOLOGY BLD-IMP: NORMAL
NEUTROPHILS # BLD AUTO: 4.3 K/UL (ref 1.82–7.42)
NEUTROPHILS NFR BLD: 69.3 % (ref 44–72)
NRBC # BLD AUTO: 0 K/UL
NRBC BLD-RTO: 0 /100 WBC (ref 0–0.2)
PLATELET # BLD AUTO: 103 K/UL (ref 164–446)
PLATELET BLD QL SMEAR: NORMAL
PMV BLD AUTO: 12.5 FL (ref 9–12.9)
RBC # BLD AUTO: 4.47 M/UL (ref 4.7–6.1)
RBC BLD AUTO: NORMAL
WBC # BLD AUTO: 6.2 K/UL (ref 4.8–10.8)

## 2024-07-17 PROCEDURE — 36415 COLL VENOUS BLD VENIPUNCTURE: CPT

## 2024-07-17 PROCEDURE — 85027 COMPLETE CBC AUTOMATED: CPT

## 2024-07-17 PROCEDURE — 85007 BL SMEAR W/DIFF WBC COUNT: CPT

## 2024-07-22 ENCOUNTER — OFFICE VISIT (OUTPATIENT)
Dept: MEDICAL GROUP | Facility: PHYSICIAN GROUP | Age: 84
End: 2024-07-22
Payer: MEDICARE

## 2024-07-22 VITALS
BODY MASS INDEX: 29.34 KG/M2 | HEART RATE: 61 BPM | WEIGHT: 198.1 LBS | SYSTOLIC BLOOD PRESSURE: 140 MMHG | HEIGHT: 69 IN | DIASTOLIC BLOOD PRESSURE: 82 MMHG | TEMPERATURE: 98.5 F | OXYGEN SATURATION: 97 %

## 2024-07-22 DIAGNOSIS — I72.3 ANEURYSM OF COMMON ILIAC ARTERY (HCC): ICD-10-CM

## 2024-07-22 DIAGNOSIS — D69.6 THROMBOCYTOPENIA (HCC): ICD-10-CM

## 2024-07-22 DIAGNOSIS — I10 HYPERTENSION, UNSPECIFIED TYPE: ICD-10-CM

## 2024-07-22 PROCEDURE — 3079F DIAST BP 80-89 MM HG: CPT | Performed by: FAMILY MEDICINE

## 2024-07-22 PROCEDURE — 99214 OFFICE O/P EST MOD 30 MIN: CPT | Performed by: FAMILY MEDICINE

## 2024-07-22 PROCEDURE — 3077F SYST BP >= 140 MM HG: CPT | Performed by: FAMILY MEDICINE

## 2024-07-22 RX ORDER — LOSARTAN POTASSIUM 25 MG/1
50 TABLET ORAL DAILY
Qty: 100 TABLET | Refills: 0
Start: 2024-07-22

## 2024-07-22 ASSESSMENT — ENCOUNTER SYMPTOMS
COUGH: 0
FALLS: 0
BRUISES/BLEEDS EASILY: 0
SHORTNESS OF BREATH: 0
NAUSEA: 0
VOMITING: 0

## 2024-07-22 ASSESSMENT — FIBROSIS 4 INDEX: FIB4 SCORE: 4.69

## 2024-10-21 ENCOUNTER — APPOINTMENT (OUTPATIENT)
Dept: LAB | Facility: MEDICAL CENTER | Age: 84
End: 2024-10-21
Payer: MEDICARE

## 2024-10-28 ENCOUNTER — OFFICE VISIT (OUTPATIENT)
Dept: CARDIOLOGY | Facility: MEDICAL CENTER | Age: 84
End: 2024-10-28
Attending: INTERNAL MEDICINE
Payer: MEDICARE

## 2024-10-28 VITALS
DIASTOLIC BLOOD PRESSURE: 76 MMHG | SYSTOLIC BLOOD PRESSURE: 134 MMHG | OXYGEN SATURATION: 99 % | HEIGHT: 69 IN | BODY MASS INDEX: 29.92 KG/M2 | WEIGHT: 202 LBS | HEART RATE: 58 BPM | RESPIRATION RATE: 12 BRPM

## 2024-10-28 DIAGNOSIS — Z86.73 HISTORY OF TIA (TRANSIENT ISCHEMIC ATTACK): ICD-10-CM

## 2024-10-28 DIAGNOSIS — I35.1 AORTIC VALVE INSUFFICIENCY, ETIOLOGY OF CARDIAC VALVE DISEASE UNSPECIFIED: ICD-10-CM

## 2024-10-28 DIAGNOSIS — I72.3 ANEURYSM OF COMMON ILIAC ARTERY (HCC): ICD-10-CM

## 2024-10-28 DIAGNOSIS — I70.0 ATHEROSCLEROSIS OF ABDOMINAL AORTA (HCC): ICD-10-CM

## 2024-10-28 DIAGNOSIS — E78.5 DYSLIPIDEMIA: ICD-10-CM

## 2024-10-28 DIAGNOSIS — I10 HYPERTENSION, UNSPECIFIED TYPE: ICD-10-CM

## 2024-10-28 PROCEDURE — 3075F SYST BP GE 130 - 139MM HG: CPT | Performed by: INTERNAL MEDICINE

## 2024-10-28 PROCEDURE — G2211 COMPLEX E/M VISIT ADD ON: HCPCS | Performed by: INTERNAL MEDICINE

## 2024-10-28 PROCEDURE — 3078F DIAST BP <80 MM HG: CPT | Performed by: INTERNAL MEDICINE

## 2024-10-28 PROCEDURE — 99214 OFFICE O/P EST MOD 30 MIN: CPT | Performed by: INTERNAL MEDICINE

## 2024-10-28 PROCEDURE — 99211 OFF/OP EST MAY X REQ PHY/QHP: CPT | Performed by: INTERNAL MEDICINE

## 2024-10-28 RX ORDER — LOSARTAN POTASSIUM 100 MG/1
100 TABLET ORAL DAILY
Qty: 100 TABLET | Refills: 3 | Status: SHIPPED | OUTPATIENT
Start: 2024-10-28

## 2024-10-28 ASSESSMENT — ENCOUNTER SYMPTOMS
DEPRESSION: 0
DYSPNEA ON EXERTION: 0
NEAR-SYNCOPE: 0
SYNCOPE: 0
WEIGHT LOSS: 0
BLURRED VISION: 0
NAUSEA: 0
CONSTIPATION: 0
COUGH: 0
IRREGULAR HEARTBEAT: 0
FLANK PAIN: 0
PND: 0
ALTERED MENTAL STATUS: 0
DIZZINESS: 0
FEVER: 0
SHORTNESS OF BREATH: 0
HEARTBURN: 0
PALPITATIONS: 0
DIARRHEA: 0
ORTHOPNEA: 0
BACK PAIN: 0
ABDOMINAL PAIN: 0
CLAUDICATION: 0
VOMITING: 0
DECREASED APPETITE: 0
WEIGHT GAIN: 0

## 2024-10-28 ASSESSMENT — FIBROSIS 4 INDEX: FIB4 SCORE: 4.69

## 2024-11-06 ENCOUNTER — HOSPITAL ENCOUNTER (OUTPATIENT)
Dept: LAB | Facility: MEDICAL CENTER | Age: 84
End: 2024-11-06
Attending: INTERNAL MEDICINE
Payer: MEDICARE

## 2024-11-06 DIAGNOSIS — I10 HYPERTENSION, UNSPECIFIED TYPE: ICD-10-CM

## 2024-11-06 LAB
ANION GAP SERPL CALC-SCNC: 8 MMOL/L (ref 7–16)
BUN SERPL-MCNC: 16 MG/DL (ref 8–22)
CALCIUM SERPL-MCNC: 10 MG/DL (ref 8.5–10.5)
CHLORIDE SERPL-SCNC: 105 MMOL/L (ref 96–112)
CO2 SERPL-SCNC: 27 MMOL/L (ref 20–33)
CREAT SERPL-MCNC: 0.97 MG/DL (ref 0.5–1.4)
GFR SERPLBLD CREATININE-BSD FMLA CKD-EPI: 78 ML/MIN/1.73 M 2
GLUCOSE SERPL-MCNC: 101 MG/DL (ref 65–99)
POTASSIUM SERPL-SCNC: 4.1 MMOL/L (ref 3.6–5.5)
SODIUM SERPL-SCNC: 140 MMOL/L (ref 135–145)

## 2024-11-06 PROCEDURE — 80048 BASIC METABOLIC PNL TOTAL CA: CPT

## 2024-11-06 PROCEDURE — 36415 COLL VENOUS BLD VENIPUNCTURE: CPT

## 2024-11-18 ENCOUNTER — OFFICE VISIT (OUTPATIENT)
Dept: MEDICAL GROUP | Facility: PHYSICIAN GROUP | Age: 84
End: 2024-11-18
Payer: MEDICARE

## 2024-11-18 VITALS
TEMPERATURE: 98.4 F | HEIGHT: 69 IN | OXYGEN SATURATION: 98 % | SYSTOLIC BLOOD PRESSURE: 124 MMHG | HEART RATE: 63 BPM | WEIGHT: 198.9 LBS | DIASTOLIC BLOOD PRESSURE: 80 MMHG | BODY MASS INDEX: 29.46 KG/M2

## 2024-11-18 DIAGNOSIS — K59.1 FUNCTIONAL DIARRHEA: ICD-10-CM

## 2024-11-18 DIAGNOSIS — I10 HYPERTENSION, UNSPECIFIED TYPE: ICD-10-CM

## 2024-11-18 DIAGNOSIS — E78.5 DYSLIPIDEMIA: ICD-10-CM

## 2024-11-18 DIAGNOSIS — R14.0 POSTPRANDIAL BLOATING: ICD-10-CM

## 2024-11-18 PROCEDURE — 99214 OFFICE O/P EST MOD 30 MIN: CPT | Performed by: FAMILY MEDICINE

## 2024-11-18 PROCEDURE — 3074F SYST BP LT 130 MM HG: CPT | Performed by: FAMILY MEDICINE

## 2024-11-18 PROCEDURE — 3079F DIAST BP 80-89 MM HG: CPT | Performed by: FAMILY MEDICINE

## 2024-11-18 ASSESSMENT — ENCOUNTER SYMPTOMS
ROS GI COMMENTS: BLOATING
FEVER: 0
DIARRHEA: 1
SHORTNESS OF BREATH: 0
BLOOD IN STOOL: 0
CHILLS: 0
COUGH: 0
VOMITING: 1

## 2024-11-18 ASSESSMENT — FIBROSIS 4 INDEX: FIB4 SCORE: 4.69

## 2024-11-18 NOTE — PROGRESS NOTES
Verbal consent was acquired by the patient to use TMAT ambient listening note generation during this visit         History of Present Illness  The patient presents today for a follow-up visit. He was last seen on 07/22/2024.    He has been experiencing intermittent loose stools for the past 2 weeks, accompanied by two episodes of vomiting. The most recent vomiting episode occurred 3 to 4 days ago, with the previous one happening 2 weeks prior. He is unable to identify any specific trigger for these symptoms. His stools are clear and watery, with no blood present. He reports occasional bloating, particularly after meals, but does not report any specific abdominal pain. He experienced nausea only during the two vomiting episodes. His symptoms improved yesterday but returned this morning, with five bowel movements so far today. He admits to not drinking enough water but tries to consume potassium-rich foods. He has not taken any medication for his current symptoms, hoping they would resolve on their own. He has not tried any over-the-counter remedies for his symptoms.    He returned from a trip to the [facility] on 09/04/2024, Upon his return, he fell ill with symptoms suggestive of COVID-19, including brain fog, but did not experience diarrhea at that time. His current symptoms began approximately a week after his losartan dosage was increased.    His medication regimen includes losartan 100 mg, Lipitor 20 mg, vitamin D, and magnesium citrate as needed. He also takes Tylenol for arthritis. He has not received any recent vaccines, apart from the influenza vaccine administered on 09/25/2024. He has not taken any antibiotics recently and does not have a gastroenterologist.    His diet has remained consistent over the past 2 weeks. He frequently dines at Mexican restaurants,[employer] or olive garden has home-cooked meals for lunch, and cereal with fruit for breakfast. He often eats out for dinner and consumes  "pears with the peel. He had wheat Chex with soy milk for breakfast today and has not yet had lunch. He consumes unsweetened soy milk and rarely eats cheese. He does not drink milk or use butter but occasionally uses grated cheese on his salad.     He reports excessive gas, both burping and flatulence, particularly after meals. These symptoms have been present for about 2 weeks. His bloating is usually central and improves after eating. He denies any fevers, chills, respiratory symptoms, chest pain, heartburn, or exposure to anyone with similar symptoms. He is still able to exercise and lift weights. He has been walking but not hiking and has not consumed water from a stream.      Review of Systems   Constitutional:  Negative for chills and fever.   Respiratory:  Negative for cough and shortness of breath.    Cardiovascular:  Negative for chest pain.   Gastrointestinal:  Positive for diarrhea and vomiting. Negative for blood in stool.        Bloating       Outpatient Medications Marked as Taking for the 11/18/24 encounter (Office Visit) with Shoshana Britt M.D.   Medication Sig Dispense Refill    losartan (COZAAR) 100 MG Tab Take 1 Tablet by mouth every day. 100 Tablet 3    atorvastatin (LIPITOR) 20 MG Tab Take 1 Tablet by mouth every evening. 100 Tablet 3    acetaminophen (TYLENOL) 650 MG CR tablet Take 650 mg by mouth every 6 hours as needed.      Cholecalciferol (VITAMIN D3) 2000 UNIT Cap Take 1 Capsule by mouth every day.      MAGNESIUM CITRATE PO Take 150 mg by mouth. Unable to recall dose - as needed         /80 (BP Location: Left arm, Patient Position: Sitting, BP Cuff Size: Adult)   Pulse 63   Temp 36.9 °C (98.4 °F) (Temporal)   Ht 1.753 m (5' 9\")   Wt 90.2 kg (198 lb 14.4 oz)   SpO2 98% , Body mass index is 29.37 kg/m².        Physical Exam  Constitutional:       Appearance: Normal appearance.   Eyes:      Extraocular Movements: Extraocular movements intact.   Cardiovascular:      Rate and Rhythm: " Normal rate and regular rhythm.      Heart sounds: Murmur heard.   Pulmonary:      Effort: Pulmonary effort is normal.      Breath sounds: Normal breath sounds.   Abdominal:      General: Bowel sounds are normal. There is no distension.      Palpations: Abdomen is soft. There is no mass.      Tenderness: There is no abdominal tenderness. There is no right CVA tenderness, left CVA tenderness, guarding or rebound.   Neurological:      Mental Status: He is alert.   Psychiatric:         Mood and Affect: Mood normal.         Behavior: Behavior normal.         Results         Assessment & Plan  1. Diarrhea.  New medical diagnosis.  He has been experiencing loose stools for the past two weeks, with occasional vomiting. His symptoms began approximately one week after increasing his losartan dosage to 100 mg.  It is crucial to maintain hydration and prevent electrolyte loss. here is a risk of constipation or blockage. He was advised to take Tylenol for fever and to replenish fluids in case of loose stools or diarrhea. Gatorade was recommended for hydration. A stool test will be ordered if symptoms persist. He was advised to monitor his condition for another week. If there is no improvement, a stool test and blood test will be conducted to check for infection.    2. Bloating.  New medical problem.  He reports occasional bloating, particularly after eating, but no specific abdominal pain. He was advised to avoid dairy products as they can exacerbate bloating. He should continue to monitor his symptoms and maintain hydration.    3.  Hypertension  Chronic medical diagnosis.  Stable.  Blood pressure 124/80.  Continue with losartan 100 mg daily.  This medication was increased from 50 to 100 mg 3 weeks ago.    4.  Dyslipidemia  Chronic medical diagnosis.  Stable.  Continue with Lipitor 20 mg daily.  March labs had LDL level at 56.    Follow-up  Patient is scheduled for a follow-up visit in 1 month.  No orders of the defined types  were placed in this encounter.            This note was created using voice recognition software (Dragon). The accuracy of the dictation is limited by the abilities of the software. I have reviewed the note prior to signing, however some errors in grammar and context are still possible. If you have any questions related to this note please do not hesitate to contact our office.

## 2024-12-17 ENCOUNTER — APPOINTMENT (OUTPATIENT)
Dept: MEDICAL GROUP | Facility: PHYSICIAN GROUP | Age: 84
End: 2024-12-17
Payer: MEDICARE

## 2025-01-22 ENCOUNTER — TELEPHONE (OUTPATIENT)
Dept: HEALTH INFORMATION MANAGEMENT | Facility: OTHER | Age: 85
End: 2025-01-22
Payer: MEDICARE

## 2025-02-05 ASSESSMENT — ENCOUNTER SYMPTOMS: GENERAL WELL-BEING: GOOD

## 2025-02-05 ASSESSMENT — PATIENT HEALTH QUESTIONNAIRE - PHQ9
2. FEELING DOWN, DEPRESSED, IRRITABLE, OR HOPELESS: SEVERAL DAYS
1. LITTLE INTEREST OR PLEASURE IN DOING THINGS: SEVERAL DAYS

## 2025-02-05 ASSESSMENT — ACTIVITIES OF DAILY LIVING (ADL): BATHING_REQUIRES_ASSISTANCE: 0

## 2025-02-06 NOTE — ASSESSMENT & PLAN NOTE
Chronic, Stable. BP in office today is 120/76. He does not check his BP at home. He currently takes losartan 100 mg daily. Follow up with PCP for continued monitoring and management.

## 2025-02-06 NOTE — ASSESSMENT & PLAN NOTE
Chronic, stable. Found on prior imaging in 2023. Patient currently takes atorvastatin 20 mg daily. Follow up with PCP for continued monitoring .

## 2025-02-06 NOTE — ASSESSMENT & PLAN NOTE
Chronic, stable problem. He denies bleeding issues. Follow up with PCP for routine lab monitoring.

## 2025-02-06 NOTE — ASSESSMENT & PLAN NOTE
Chronic, stable. He currently takes atorvastatin 20 mg daily. We discussed his dietary/lifestyle regimen. Follow up with PCP for continued monitoring and management.  Lab Results   Component Value Date/Time    CHOLSTRLTOT 110 03/01/2024 08:46 AM    TRIGLYCERIDE 72 03/01/2024 08:46 AM    HDL 40 03/01/2024 08:46 AM    LDL 56 03/01/2024 08:46 AM

## 2025-02-06 NOTE — ASSESSMENT & PLAN NOTE
Chronic, she does follow with vascular medicine. No evidence of PAD given normal ABIs. Follow up with vascular as per routine.

## 2025-02-07 ENCOUNTER — OFFICE VISIT (OUTPATIENT)
Dept: FAMILY PLANNING/WOMEN'S HEALTH CLINIC | Facility: PHYSICIAN GROUP | Age: 85
End: 2025-02-07
Payer: MEDICARE

## 2025-02-07 VITALS
SYSTOLIC BLOOD PRESSURE: 120 MMHG | BODY MASS INDEX: 30.51 KG/M2 | HEART RATE: 66 BPM | OXYGEN SATURATION: 99 % | WEIGHT: 206 LBS | DIASTOLIC BLOOD PRESSURE: 76 MMHG | HEIGHT: 69 IN

## 2025-02-07 DIAGNOSIS — D69.6 THROMBOCYTOPENIA (HCC): ICD-10-CM

## 2025-02-07 DIAGNOSIS — D70.8 OTHER NEUTROPENIA (HCC): ICD-10-CM

## 2025-02-07 DIAGNOSIS — I72.3 ANEURYSM OF COMMON ILIAC ARTERY (HCC): ICD-10-CM

## 2025-02-07 DIAGNOSIS — I77.9 DISORDER OF ARTERIES AND ARTERIOLES, UNSPECIFIED (HCC): ICD-10-CM

## 2025-02-07 DIAGNOSIS — E78.5 DYSLIPIDEMIA: ICD-10-CM

## 2025-02-07 DIAGNOSIS — I10 HYPERTENSION, UNSPECIFIED TYPE: ICD-10-CM

## 2025-02-07 DIAGNOSIS — I70.0 ATHEROSCLEROSIS OF ABDOMINAL AORTA (HCC): ICD-10-CM

## 2025-02-07 PROCEDURE — 1126F AMNT PAIN NOTED NONE PRSNT: CPT

## 2025-02-07 PROCEDURE — 3074F SYST BP LT 130 MM HG: CPT

## 2025-02-07 PROCEDURE — 3078F DIAST BP <80 MM HG: CPT

## 2025-02-07 PROCEDURE — G0439 PPPS, SUBSEQ VISIT: HCPCS

## 2025-02-07 SDOH — ECONOMIC STABILITY: HOUSING INSECURITY: IN THE LAST 12 MONTHS, WAS THERE A TIME WHEN YOU WERE NOT ABLE TO PAY THE MORTGAGE OR RENT ON TIME?: NO

## 2025-02-07 SDOH — ECONOMIC STABILITY: HOUSING INSECURITY: AT ANY TIME IN THE PAST 12 MONTHS, WERE YOU HOMELESS OR LIVING IN A SHELTER (INCLUDING NOW)?: NO

## 2025-02-07 SDOH — ECONOMIC STABILITY: FOOD INSECURITY: WITHIN THE PAST 12 MONTHS, THE FOOD YOU BOUGHT JUST DIDN'T LAST AND YOU DIDN'T HAVE MONEY TO GET MORE.: NEVER TRUE

## 2025-02-07 SDOH — ECONOMIC STABILITY: HOUSING INSECURITY: IN THE PAST 12 MONTHS, HOW MANY TIMES HAVE YOU MOVED WHERE YOU WERE LIVING?: 1

## 2025-02-07 SDOH — ECONOMIC STABILITY: FOOD INSECURITY: HOW HARD IS IT FOR YOU TO PAY FOR THE VERY BASICS LIKE FOOD, HOUSING, MEDICAL CARE, AND HEATING?: NOT HARD AT ALL

## 2025-02-07 SDOH — ECONOMIC STABILITY: FOOD INSECURITY: WITHIN THE PAST 12 MONTHS, YOU WORRIED THAT YOUR FOOD WOULD RUN OUT BEFORE YOU GOT THE MONEY TO BUY MORE.: NEVER TRUE

## 2025-02-07 SDOH — ECONOMIC STABILITY: TRANSPORTATION INSECURITY: IN THE PAST 12 MONTHS, HAS LACK OF TRANSPORTATION KEPT YOU FROM MEDICAL APPOINTMENTS OR FROM GETTING MEDICATIONS?: NO

## 2025-02-07 ASSESSMENT — FIBROSIS 4 INDEX: FIB4 SCORE: 4.75

## 2025-02-07 ASSESSMENT — ACTIVITIES OF DAILY LIVING (ADL): LACK_OF_TRANSPORTATION: NO

## 2025-02-07 ASSESSMENT — PAIN SCALES - GENERAL: PAINLEVEL_OUTOF10: NO PAIN

## 2025-02-07 ASSESSMENT — PATIENT HEALTH QUESTIONNAIRE - PHQ9
5. POOR APPETITE OR OVEREATING: 1 - SEVERAL DAYS
SUM OF ALL RESPONSES TO PHQ QUESTIONS 1-9: 8
CLINICAL INTERPRETATION OF PHQ2 SCORE: 2

## 2025-02-07 NOTE — PROGRESS NOTES
Comprehensive Health Assessment Program     Michael Pradhan is a 85 y.o. here for his comprehensive health assessment.    Patient Active Problem List    Diagnosis Date Noted    Functional diarrhea 2024    History of pneumonia 2024    Elevated glucose 2023    Chronic pain of both knees 2023    Vasovagal syncope 2023    Atherosclerosis of abdominal aorta (HCC) 2023    Thrombocytopenia (HCC) 2023    Other neutropenia (HCC) 2023    Aneurysm of common iliac artery (HCC) 2022    ANGUS positive 2021    Vitamin D deficiency 2021    History of TIA (transient ischemic attack) 2021    Hypertension 2021    Nonspecific abnormal result of function study of peripheral nervous system and special senses 2021    Disorder of arteries and arterioles, unspecified (HCC) 2021    Low back pain 2021    Aortic regurgitation 2019    H/O bee sting allergy 2017    Dyslipidemia 2017       Current Outpatient Medications   Medication Sig Dispense Refill    losartan (COZAAR) 100 MG Tab Take 1 Tablet by mouth every day. 100 Tablet 3    atorvastatin (LIPITOR) 20 MG Tab Take 1 Tablet by mouth every evening. 100 Tablet 3    acetaminophen (TYLENOL) 650 MG CR tablet Take 650 mg by mouth every 6 hours as needed.      Cholecalciferol (VITAMIN D3) 2000 UNIT Cap Take 1 Capsule by mouth every day.      MAGNESIUM CITRATE PO Take 150 mg by mouth. Unable to recall dose - as needed       No current facility-administered medications for this visit.          Current supplements as per medication list.     Allergies:   Bee  Social History     Tobacco Use    Smoking status: Former     Current packs/day: 0.00     Average packs/day: 1 pack/day for 5.0 years (5.0 ttl pk-yrs)     Types: Cigarettes     Start date: 1964     Quit date: 1969     Years since quittin.0    Smokeless tobacco: Never    Tobacco comments:     Smoked for about 4-5 ys  and quit in 1969.   Vaping Use    Vaping status: Never Used   Substance Use Topics    Alcohol use: Yes     Alcohol/week: 0.0 - 1.2 oz     Comment: a month    Drug use: No     Family History   Problem Relation Age of Onset    Diabetes Mother     Hypertension Father     Diabetes Sister     Diabetes Sister     Other Brother         Spinal Miningitis    Schizophrenia Brother     Cancer Brother         Esophigial    Dementia Maternal Grandmother     Dementia Paternal Grandmother     Colon Cancer Paternal Grandfather      Michael  has a past medical history of Arthritis, BMI 27.0-27.9,adult (05/27/2021), Encounter for medical examination to establish care, Heart burn, High cholesterol, Hyperlipidemia, Hypertension, Rhinorrhea (01/31/2023), and suspected TIA (2018).   Past Surgical History:   Procedure Laterality Date    INGUINAL HERNIA LAPAROSCOPIC Bilateral 1/22/2020    Procedure: REPAIR, HERNIA, INGUINAL, LAPAROSCOPIC- WITH MESH;  Surgeon: Niall Linares M.D.;  Location: SURGERY [facility];  Service: General    COLONOSCOPY      TONSILLECTOMY         Screening:  In the last six months have you experienced any leakage of urine? No    Depression Screening  Little interest or pleasure in doing things?  1 - several days  Feeling down, depressed , or hopeless? 1 - several days  Trouble falling or staying asleep, or sleeping too much?  1 - several days  Feeling tired or having little energy?  1 - several days  Poor appetite or overeating?  1 - several days  Feeling bad about yourself - or that you are a failure or have let yourself or your family down? 0 - not at all  Trouble concentrating on things, such as reading the newspaper or watching television? 1 - several days  Moving or speaking so slowly that other people could have noticed.  Or the opposite - being so fidgety or restless that you have been moving around a lot more than usual?  2 - more than half the days  Thoughts that you would be better off dead, or of  hurting yourself?  0 - not at all  Patient Health Questionnaire Score: 8    If depressive symptoms identified deferred to follow up visit unless specifically addressed in assessment and plan.    Interpretation of PHQ-9 Total Score   Score Severity   1-4 No Depression   5-9 Mild Depression   10-14 Moderate Depression   15-19 Moderately Severe Depression   20-27 Severe Depression    Screening for Cognitive Impairment  Do you or any of your friends or family members have any concern about your memory? No  Three Minute Recall (Leader, Season, Table) 3/3    Jamaal clock face with all 12 numbers and set the hands to show 10 minutes after 11.  Yes    Cognitive concerns identified deferred for follow up unless specifically addressed in assessment and plan.    Fall Risk Assessment  Has the patient had two or more falls in the last year or any fall with injury in the last year?  No    Safety Assessment  Do you always wear your seatbelt?  Yes  Any changes to home needed to function safely? No  Difficulty hearing.  No  Patient counseled about all safety risks that were identified.    Functional Assessment ADLs  Are there any barriers preventing you from cooking for yourself or meeting nutritional needs?  Yes.    Are there any barriers preventing you from driving safely or obtaining transportation?  No.    Are there any barriers preventing you from using a telephone or calling for help?  No    Are there any barriers preventing you from shopping?  No.    Are there any barriers preventing you from taking care of your own finances?  No    Are there any barriers preventing you from managing your medications?  No    Are there any barriers preventing you from showering, bathing or dressing yourself? No    Are there any barriers preventing you from doing housework or laundry? No    Are there any barriers preventing you from using the toilet?No    Are you currently engaging in any exercise or physical activity?  Yes.  He lifts weights 3  days out of a week. Walking.    Self-Assessment of Health  What is your perception of your health? Good    Do you sleep more than six hours a night? Yes    In the past 7 days, how much did pain keep you from doing your normal work? None    Do you spend quality time with family or friends (virtually or in person)? No    Do you usually eat a heart healthy diet that constists of a variety of fruits, vegetables, whole grains and fiber? Yes    Do you eat foods high in fat and/or Fast Food more than three times per week? No    How concerned are you that your medical conditions are not being well managed? Not at all    Are you worried that in the next 2 months, you may not have stable housing that you own, rent, or stay in as part of a household? No        Advance Care Planning  Do you have an Advance Directive, Living Will, Durable Power of , or POLST? Yes    Living Will            Health Maintenance Summary            Overdue - COVID-19 Vaccine (8 - 2024-25 season) Overdue since 9/1/2024 09/27/2023  Imm Admin: Comirnaty (Covid-19 Vaccine, Mrna, [id number] Formula)    05/09/2023  Imm Admin: PFIZER BIVALENT SARS-COV-2 VACCINE (12+)    09/12/2022  Imm Admin: PFIZER BIVALENT SARS-COV-2 VACCINE (12+)    04/04/2022  Imm Admin: PFIZER RENDON CAP SARS-COV-2 VACCINATION (12+)    10/02/2021  Imm Admin: PFIZER PURPLE CAP SARS-COV-2 VACCINATION (12+)    Only the first 5 history entries have been loaded, but more history exists.              Annual Wellness Visit (Yearly) Next due on 2/7/2026 02/07/2025  Level of Service: VT ANNUAL WELLNESS VISIT-INCLUDES PPPS SUBSEQUE*    05/08/2024  Level of Service: VT ANNUAL WELLNESS VISIT-INCLUDES PPPS SUBSEQUE*    01/20/2023  Level of Service: VT ANNUAL WELLNESS VISIT-INCLUDES PPPS SUBSEQUE*    01/13/2022  Level of Service: VT ANNUAL WELLNESS VISIT-INCLUDES PPPS SUBSEQUE*    05/27/2021  Level of Service: VT ANNUAL WELLNESS VISIT-INCLUDES PPPS SUBSEQUE*    Only the first 5  history entries have been loaded, but more history exists.              IMM DTaP/Tdap/Td Vaccine (2 - Td or Tdap) Next due on 7/24/2027 07/24/2017  Imm Admin: Tdap Vaccine              Pneumococcal Vaccine: 65+ Years (Series Information) Completed      10/10/2015  Imm Admin: Pneumococcal polysaccharide vaccine (PPSV-23)    07/22/2015  Imm Admin: Pneumococcal Conjugate Vaccine (Prevnar/PCV-13)    02/05/2012  Imm Admin: Pneumococcal Conjugate Vaccine (Prevnar/PCV-13)              Zoster (Shingles) Vaccines (Series Information) Completed      01/20/2022  Imm Admin: Zoster Vaccine Recombinant (RZV) (SHINGRIX)    10/22/2021  Imm Admin: Zoster Vaccine Recombinant (RZV) (SHINGRIX)    03/07/2013  Imm Admin: Zoster Vaccine Live (ZVL) (Zostavax) - HISTORICAL DATA              Influenza Vaccine (Series Information) Completed      09/25/2024  Outside Immunization: Influenza Tri, Adj    09/25/2023  Imm Admin: Influenza Vaccine, Quadrivalent, Adjuvanted (Pf)    09/12/2022  Imm Admin: Influenza, Unspecified - HISTORICAL DATA    09/02/2021  Imm Admin: Influenza Vaccine Quad Inj (Pf)    09/29/2020  Imm Admin: Influenza, Unspecified - HISTORICAL DATA    Only the first 5 history entries have been loaded, but more history exists.              Hepatitis A Vaccine (Hep A) (Series Information) Aged Out      No completion history exists for this topic.              Hepatitis B Vaccine (Hep B) (Series Information) Aged Out      No completion history exists for this topic.              HPV Vaccines (Series Information) Aged Out      No completion history exists for this topic.              Polio Vaccine (Inactivated Polio) (Series Information) Aged Out      No completion history exists for this topic.              Meningococcal Immunization (Series Information) Aged Out      No completion history exists for this topic.              Discontinued - Colorectal Cancer Screening  Discontinued        Frequency changed to Never automatically  "(Topic No Longer Applies)    03/08/2022  COLOGUARD COLON CANCER SCREENING    02/09/2016  Colonoscopy (Done)                    Patient Care Team:  Shoshana Britt M.D. as PCP - General (Family Medicine)  Gale Ruiz as    [facility] [place] as Consulting Physician  Dao Mock M.D. as Consulting Physician (Ophthalmology)  Gurmeet Daugherty M.D. (Surgery)  Re Almendarez M.D. (Rheumatology)    Financial Resource Strain: Low Risk  (2/7/2025)    Overall Financial Resource Strain (CARDIA)     Difficulty of Paying Living Expenses: Not hard at all      Transportation Needs: No Transportation Needs (2/7/2025)    PRAPARE - Transportation     Lack of Transportation (Medical): No     Lack of Transportation (Non-Medical): No      Food Insecurity: No Food Insecurity (2/7/2025)    Hunger Vital Sign     Worried About Running Out of Food in the Last Year: Never true     Ran Out of Food in the Last Year: Never true        Encounter Vitals  Blood Pressure : 120/76  Pulse: 66  Pulse Oximetry: 99 %  Weight: 93.4 kg (206 lb)  Height: 175.3 cm (5' 9\")  BMI (Calculated): 30.42  Pain Score: No pain     Physical Exam:  Constitutional: NAD  HENMT: NC/AT, EOMI  Cardiovascular: RRR, + murmur  Lungs: CTAB, no w/r/r  Extremities: No c/c/e  Neurologic: Alert & oriented x3, CN II-XII grossly intact      Assessment and Plan. The following treatment and monitoring plan is recommended:  Aneurysm of common iliac artery (HCC)  Chronic, stable. Noted on prior imaging in 2022. Revealed dilation of 2.1 cm. Has seen vascular surgery. Follow up with PCP for continued monitoring.     Atherosclerosis of abdominal aorta (HCC)  Chronic, stable. Found on prior imaging in 2023. Patient currently takes atorvastatin 20 mg daily. Follow up with PCP for continued monitoring .    Disorder of arteries and arterioles, unspecified (HCC)  Chronic, she does follow with vascular medicine. No evidence of PAD given normal ABIs. " Follow up with vascular as per routine.     Dyslipidemia  Chronic, stable. He currently takes atorvastatin 20 mg daily. We discussed his dietary/lifestyle regimen. Follow up with PCP for continued monitoring and management.  Lab Results   Component Value Date/Time    CHOLSTRLTOT 110 03/01/2024 08:46 AM    TRIGLYCERIDE 72 03/01/2024 08:46 AM    HDL 40 03/01/2024 08:46 AM    LDL 56 03/01/2024 08:46 AM         Hypertension  Chronic, Stable. BP in office today is 120/76. He does not check his BP at home. He currently takes losartan 100 mg daily. Follow up with PCP for continued monitoring and management.      Other neutropenia (HCC)  Chronic, improved. Follow up with PCP for routine labs.    Thrombocytopenia (HCC)  Chronic, stable problem. He denies bleeding issues. Follow up with PCP for routine lab monitoring.     Services suggested: No services needed at this time  Health Care Screening: Age-appropriate preventive services recommended by USPTF and ACIP covered by Medicare were discussed today. Services ordered if indicated and agreed upon by the patient.  Referrals offered: Community-based lifestyle interventions to reduce health risks and promote self-management and wellness, fall prevention, nutrition, physical activity, tobacco-use cessation, weight loss, and mental health services as per orders if indicated.    Discussion today about general wellness and lifestyle habits:    Prevent falls and reduce trip hazards; Cautioned about securing or removing rugs.  Have a working fire alarm and carbon monoxide detector.  Engage in regular physical activity and social activities.    Follow-up: Return for appointment with Primary Care Provider as needed..

## 2025-03-14 ENCOUNTER — HOSPITAL ENCOUNTER (OUTPATIENT)
Dept: LAB | Facility: MEDICAL CENTER | Age: 85
End: 2025-03-14
Attending: FAMILY MEDICINE
Payer: MEDICARE

## 2025-03-14 DIAGNOSIS — R14.0 POSTPRANDIAL BLOATING: ICD-10-CM

## 2025-03-14 LAB
ALBUMIN SERPL BCP-MCNC: 4.2 G/DL (ref 3.2–4.9)
ALBUMIN/GLOB SERPL: 1.4 G/DL
ALP SERPL-CCNC: 53 U/L (ref 30–99)
ALT SERPL-CCNC: 27 U/L (ref 2–50)
ANION GAP SERPL CALC-SCNC: 8 MMOL/L (ref 7–16)
AST SERPL-CCNC: 28 U/L (ref 12–45)
BASOPHILS # BLD AUTO: 0 % (ref 0–1.8)
BASOPHILS # BLD: 0 K/UL (ref 0–0.12)
BILIRUB SERPL-MCNC: 0.6 MG/DL (ref 0.1–1.5)
BUN SERPL-MCNC: 24 MG/DL (ref 8–22)
CALCIUM ALBUM COR SERPL-MCNC: 9.6 MG/DL (ref 8.5–10.5)
CALCIUM SERPL-MCNC: 9.8 MG/DL (ref 8.5–10.5)
CHLORIDE SERPL-SCNC: 106 MMOL/L (ref 96–112)
CO2 SERPL-SCNC: 26 MMOL/L (ref 20–33)
CREAT SERPL-MCNC: 1 MG/DL (ref 0.5–1.4)
EOSINOPHIL # BLD AUTO: 0.04 K/UL (ref 0–0.51)
EOSINOPHIL NFR BLD: 0.9 % (ref 0–6.9)
ERYTHROCYTE [DISTWIDTH] IN BLOOD BY AUTOMATED COUNT: 46.2 FL (ref 35.9–50)
GFR SERPLBLD CREATININE-BSD FMLA CKD-EPI: 75 ML/MIN/1.73 M 2
GLOBULIN SER CALC-MCNC: 3.1 G/DL (ref 1.9–3.5)
GLUCOSE SERPL-MCNC: 104 MG/DL (ref 65–99)
HCT VFR BLD AUTO: 43.2 % (ref 42–52)
HGB BLD-MCNC: 14.4 G/DL (ref 14–18)
LIPASE SERPL-CCNC: 86 U/L (ref 11–82)
LYMPHOCYTES # BLD AUTO: 1.6 K/UL (ref 1–4.8)
LYMPHOCYTES NFR BLD: 34.8 % (ref 22–41)
MANUAL DIFF BLD: NORMAL
MCH RBC QN AUTO: 33 PG (ref 27–33)
MCHC RBC AUTO-ENTMCNC: 33.3 G/DL (ref 32.3–36.5)
MCV RBC AUTO: 99.1 FL (ref 81.4–97.8)
MONOCYTES # BLD AUTO: 0.64 K/UL (ref 0–0.85)
MONOCYTES NFR BLD AUTO: 13.9 % (ref 0–13.4)
MORPHOLOGY BLD-IMP: NORMAL
NEUTROPHILS # BLD AUTO: 2.32 K/UL (ref 1.82–7.42)
NEUTROPHILS NFR BLD: 50.4 % (ref 44–72)
NRBC # BLD AUTO: 0 K/UL
NRBC BLD-RTO: 0 /100 WBC (ref 0–0.2)
PLATELET # BLD AUTO: 121 K/UL (ref 164–446)
PLATELET BLD QL SMEAR: NORMAL
PMV BLD AUTO: 12.1 FL (ref 9–12.9)
POTASSIUM SERPL-SCNC: 4.1 MMOL/L (ref 3.6–5.5)
PROT SERPL-MCNC: 7.3 G/DL (ref 6–8.2)
RBC # BLD AUTO: 4.36 M/UL (ref 4.7–6.1)
RBC BLD AUTO: NORMAL
SODIUM SERPL-SCNC: 140 MMOL/L (ref 135–145)
WBC # BLD AUTO: 4.6 K/UL (ref 4.8–10.8)

## 2025-03-14 PROCEDURE — 83690 ASSAY OF LIPASE: CPT

## 2025-03-14 PROCEDURE — 36415 COLL VENOUS BLD VENIPUNCTURE: CPT

## 2025-03-14 PROCEDURE — 80053 COMPREHEN METABOLIC PANEL: CPT

## 2025-03-14 PROCEDURE — 85027 COMPLETE CBC AUTOMATED: CPT

## 2025-03-14 PROCEDURE — 85007 BL SMEAR W/DIFF WBC COUNT: CPT

## 2025-03-19 ENCOUNTER — RESULTS FOLLOW-UP (OUTPATIENT)
Dept: MEDICAL GROUP | Facility: PHYSICIAN GROUP | Age: 85
End: 2025-03-19

## 2025-03-22 DIAGNOSIS — E78.5 DYSLIPIDEMIA: ICD-10-CM

## 2025-03-24 RX ORDER — ATORVASTATIN CALCIUM 20 MG/1
20 TABLET, FILM COATED ORAL EVERY EVENING
Qty: 100 TABLET | Refills: 3 | Status: SHIPPED | OUTPATIENT
Start: 2025-03-24

## 2025-03-27 ENCOUNTER — HOSPITAL ENCOUNTER (OUTPATIENT)
Facility: MEDICAL CENTER | Age: 85
End: 2025-03-27
Attending: FAMILY MEDICINE
Payer: MEDICARE

## 2025-03-27 DIAGNOSIS — R14.0 POSTPRANDIAL BLOATING: ICD-10-CM

## 2025-03-27 DIAGNOSIS — K59.1 FUNCTIONAL DIARRHEA: ICD-10-CM

## 2025-03-27 LAB
H PYLORI AG STL QL IA: NOT DETECTED
LACTOFERRIN STL QL IA: NEGATIVE

## 2025-03-27 PROCEDURE — 83630 LACTOFERRIN FECAL (QUAL): CPT

## 2025-03-27 PROCEDURE — 87338 HPYLORI STOOL AG IA: CPT

## 2025-03-27 PROCEDURE — 83993 ASSAY FOR CALPROTECTIN FECAL: CPT

## 2025-03-29 LAB — CALPROTECTIN STL-MCNT: 13 UG/G

## 2025-03-31 ENCOUNTER — RESULTS FOLLOW-UP (OUTPATIENT)
Dept: MEDICAL GROUP | Facility: PHYSICIAN GROUP | Age: 85
End: 2025-03-31

## 2025-04-23 ENCOUNTER — OFFICE VISIT (OUTPATIENT)
Dept: MEDICAL GROUP | Facility: PHYSICIAN GROUP | Age: 85
End: 2025-04-23
Payer: MEDICARE

## 2025-04-23 VITALS
TEMPERATURE: 98.4 F | BODY MASS INDEX: 30.96 KG/M2 | HEIGHT: 69 IN | HEART RATE: 60 BPM | SYSTOLIC BLOOD PRESSURE: 134 MMHG | WEIGHT: 209 LBS | DIASTOLIC BLOOD PRESSURE: 90 MMHG | OXYGEN SATURATION: 96 %

## 2025-04-23 DIAGNOSIS — E78.5 DYSLIPIDEMIA: ICD-10-CM

## 2025-04-23 DIAGNOSIS — R14.0 BLOATING: ICD-10-CM

## 2025-04-23 DIAGNOSIS — I35.1 AORTIC VALVE INSUFFICIENCY, ETIOLOGY OF CARDIAC VALVE DISEASE UNSPECIFIED: ICD-10-CM

## 2025-04-23 DIAGNOSIS — I10 HYPERTENSION, UNSPECIFIED TYPE: ICD-10-CM

## 2025-04-23 DIAGNOSIS — R73.09 ELEVATED GLUCOSE: ICD-10-CM

## 2025-04-23 DIAGNOSIS — D69.6 THROMBOCYTOPENIA (HCC): ICD-10-CM

## 2025-04-23 PROCEDURE — 99214 OFFICE O/P EST MOD 30 MIN: CPT | Performed by: FAMILY MEDICINE

## 2025-04-23 PROCEDURE — 3080F DIAST BP >= 90 MM HG: CPT | Performed by: FAMILY MEDICINE

## 2025-04-23 PROCEDURE — 3075F SYST BP GE 130 - 139MM HG: CPT | Performed by: FAMILY MEDICINE

## 2025-04-23 ASSESSMENT — ENCOUNTER SYMPTOMS
FALLS: 0
SHORTNESS OF BREATH: 0
DIARRHEA: 0
HEARTBURN: 0
COUGH: 0
HEADACHES: 0
BRUISES/BLEEDS EASILY: 0
ABDOMINAL PAIN: 0
VOMITING: 0
NAUSEA: 0

## 2025-04-23 ASSESSMENT — FIBROSIS 4 INDEX: FIB4 SCORE: 3.7

## 2025-04-23 NOTE — PROGRESS NOTES
Verbal consent was acquired by the patient to use [employer] ambient listening note generation during this visit         History of Present Illness  The patient presents today for a follow-up visit. He was last seen on 11/18/2024. He would like to review labs completed last month.    No significant changes in health status are reported since the last visit. He is currently on losartan 100 mg for blood pressure management, which was taken this morning. Home blood pressure readings typically average around 128/72. No headaches or chest pain are reported. No caffeine was consumed today, but a high-protein drink was taken in preparation for a gym session post-visit.  Blood pressure today in the 134/90.  Blood pressure on my recheck was 156/92.    The regimen of atorvastatin 20 mg for cholesterol control, vitamin D, magnesium citrate, and Tylenol as needed continues.    Gastrointestinal issues experienced during the last visit have resolved after 2 episodes. No current diarrhea, vomiting, or nausea are reported. However, bloating after meals persists, a symptom historically dealt with. Frequent burping and flatulence are also reported, which are not new symptoms. No abdominal pain radiating to the back or right upper quadrant pain is reported.    Inadequate water intake is acknowledged. No easy bruising or recent nosebleeds are reported, although there is a history of frequent nosebleeds due to a poorly set broken nose from youth, resulting in a thin septum. No recent falls are reported. Mild swelling in the feet is noted, which is not problematic with regular exercise. A slight decrease in respiratory function compared to previous years is reported.      Review of Systems   HENT:  Negative for nosebleeds.    Respiratory:  Negative for cough and shortness of breath.    Cardiovascular:  Negative for chest pain and leg swelling.   Gastrointestinal:  Negative for abdominal pain, diarrhea, heartburn, nausea and vomiting.     "    Chronic bloating   Musculoskeletal:  Negative for falls.   Neurological:  Negative for headaches.   Endo/Heme/Allergies:  Does not bruise/bleed easily.       Outpatient Medications Marked as Taking for the 4/23/25 encounter (Office Visit) with Shoshana Britt M.D.   Medication Sig Dispense Refill    atorvastatin (LIPITOR) 20 MG Tab Take 1 tablet by mouth in the evening 100 Tablet 3    losartan (COZAAR) 100 MG Tab Take 1 Tablet by mouth every day. 100 Tablet 3    acetaminophen (TYLENOL) 650 MG CR tablet Take 650 mg by mouth every 6 hours as needed.      Cholecalciferol (VITAMIN D3) 2000 UNIT Cap Take 1 Capsule by mouth every day.      MAGNESIUM CITRATE PO Take 150 mg by mouth. Unable to recall dose - as needed         BP (!) 134/90   Pulse 60   Temp 36.9 °C (98.4 °F) (Temporal)   Ht 1.753 m (5' 9\")   Wt 94.8 kg (209 lb)   SpO2 96% , Body mass index is 30.86 kg/m².        Physical Exam  Constitutional:       Appearance: Normal appearance.   Eyes:      Extraocular Movements: Extraocular movements intact.   Cardiovascular:      Rate and Rhythm: Normal rate and regular rhythm.      Heart sounds: Murmur heard.   Pulmonary:      Effort: Pulmonary effort is normal.      Breath sounds: Normal breath sounds.   Neurological:      Mental Status: He is alert.   Psychiatric:         Mood and Affect: Mood normal.         Behavior: Behavior normal.       Results  Labs   - H. pylori test: 03/2025, Normal   - Stool test for inflammation: 03/2025, Normal   - Liver function tests: 03/2025, Normal   - Pancreas test: 03/2025, Slightly above normal, lipase level at 86.   - Fasting glucose level: 03/2025, 104   - Platelet count: 03/2025, Slightly below normal       Assessment & Plan  1. Hypertension.  Chronic medical diagnosis.  Not well-controlled.- Blood pressure readings have been consistently elevated, with today's reading at 156/92.  - Currently on losartan 100 mg daily.  - Advised to monitor blood pressure at home and bring " home blood pressure monitor to the next visit for calibration with office equipment.  - Follow-up appointment with the nurse scheduled in approximately 7 to 10 days for a blood pressure recheck; consideration will be given to adding another antihypertensive medication if home readings remain high.    2. Hyperlipidemia.  Chronic medical diagnosis.  Stable.  March 2024 labs had total cholesterol of 110 and LDL at 56.  - Currently taking atorvastatin 20 mg daily for cholesterol management.  - No new symptoms reported.  - Continue current medication regimen.    3. Bloating  Chronic. Improving. States that diarrhea has resolved  - Reports persistent bloating and gas after eating, which is a historical issue.  - Previous tests for H. pylori and inflammatory bowel disease were negative; liver function tests were normal.  - Pancreatic enzyme test was slightly above normal; this will be repeated during the next blood work.    4. Mild thrombocytopenia.  Chronic medical diagnosis.  Stable.  March labs with platelet count 721.  - Platelet count has been intermittently low since at least 2018.  - No new symptoms such as easy bruising or nosebleeds reported.  - Condition will continue to be monitored.    5.  Elevated glucose  Chronic medical diagnosis.  Stable.  Recent labs with fasting glucose have 104.  His last A1c from a year ago was at 5.6%.    6.  Aortic valve insufficiency  Chronic medical diagnosis.  Stable.  Denies any chest pain or shortness of breath.  Had his consultation with cardiology October 2024 with recommendations to follow-up in 1 year.  Recommendations to repeat echo in 1 year.  Follow-up  - Follow-up appointment scheduled for August 2025.  No orders of the defined types were placed in this encounter.          This note was created using voice recognition software (Dragon) and [employer]. The accuracy of the dictation is limited by the abilities of the software.  . Occasional transcription errors may have escaped  proof reading. I have made every reasonable attempt to correct obvious errors, but I expect that there are errors of grammar and possibly content that I did not discover before finalizing the note. ~

## 2025-05-07 ENCOUNTER — NON-PROVIDER VISIT (OUTPATIENT)
Dept: MEDICAL GROUP | Facility: PHYSICIAN GROUP | Age: 85
End: 2025-05-07
Payer: MEDICARE

## 2025-05-07 VITALS — SYSTOLIC BLOOD PRESSURE: 140 MMHG | DIASTOLIC BLOOD PRESSURE: 80 MMHG

## 2025-05-07 PROCEDURE — 99999 PR NO CHARGE: CPT | Performed by: FAMILY MEDICINE

## 2025-05-07 NOTE — PROGRESS NOTES
Kermit Pradhan is a 85 y.o. male here for a non-provider visit for Bp check    Encounter Vitals  Blood Pressure : (!) 140/80 manual  149/68 on home machine    If abnormal, was the Registered Nurse (office provider if RN is unavailable) notified today? Yes    Routed to PCP/Requested Provider? Yes

## 2025-07-15 ENCOUNTER — OFFICE VISIT (OUTPATIENT)
Dept: MEDICAL GROUP | Facility: PHYSICIAN GROUP | Age: 85
End: 2025-07-15
Payer: MEDICARE

## 2025-07-15 VITALS
TEMPERATURE: 98.3 F | SYSTOLIC BLOOD PRESSURE: 140 MMHG | BODY MASS INDEX: 30.29 KG/M2 | OXYGEN SATURATION: 97 % | HEIGHT: 69 IN | WEIGHT: 204.5 LBS | DIASTOLIC BLOOD PRESSURE: 90 MMHG | HEART RATE: 60 BPM

## 2025-07-15 DIAGNOSIS — R60.0 PEDAL EDEMA: ICD-10-CM

## 2025-07-15 DIAGNOSIS — I10 HYPERTENSION, UNSPECIFIED TYPE: Primary | ICD-10-CM

## 2025-07-15 DIAGNOSIS — E78.5 DYSLIPIDEMIA: ICD-10-CM

## 2025-07-15 PROCEDURE — 3077F SYST BP >= 140 MM HG: CPT | Performed by: FAMILY MEDICINE

## 2025-07-15 PROCEDURE — G2211 COMPLEX E/M VISIT ADD ON: HCPCS | Performed by: FAMILY MEDICINE

## 2025-07-15 PROCEDURE — 99214 OFFICE O/P EST MOD 30 MIN: CPT | Performed by: FAMILY MEDICINE

## 2025-07-15 PROCEDURE — 3080F DIAST BP >= 90 MM HG: CPT | Performed by: FAMILY MEDICINE

## 2025-07-15 RX ORDER — HYDROCHLOROTHIAZIDE 12.5 MG/1
12.5 CAPSULE ORAL DAILY
Qty: 100 CAPSULE | Refills: 3 | Status: SHIPPED | OUTPATIENT
Start: 2025-07-15 | End: 2025-07-23

## 2025-07-15 ASSESSMENT — ENCOUNTER SYMPTOMS
SHORTNESS OF BREATH: 0
HEADACHES: 0

## 2025-07-15 ASSESSMENT — FIBROSIS 4 INDEX: FIB4 SCORE: 3.7

## 2025-07-15 NOTE — PROGRESS NOTES
"Verbal consent was acquired by the patient to use [employer] ambient listening note generation during this visit         History of Present Illness  The patient presents for a follow-up on hypertension, last seen on 04/23/2025.    Hypertension  - Takes losartan 100 mg daily but rarely checks his blood pressure at home  - No headaches, chest pain, or dyspnea  - saw cards, Dr Skinner, last October  Bp 140/90. On my recheck 146/84  - Uses an arm machine to monitor BP    Foot Swelling  - Reports increased foot swelling, more pronounced in summer  - Manageable without pain  - Sits at a computer for 4-5 hours daily without leg elevation  - Elevates feet for 20 minutes a few times a day    Oxygen Levels  - Noticed occasional oxygen readings in the lower 90s, most recently yesterday evening  - No dyspnea or snoring  Oxygen sats 97% RA  - Active lifestyle with regular gym workouts  - No perceived oxygen deficiency    Diet and Supplements  - No added salt to food  - Frequently eats out in the evenings, about 50% of the times  - Typically has a salad during the day and a smoothie in the morning  - Currently taking atorvastatin 20 mg daily, vitamin D, magnesium, and Tylenol as needed  - No urinary issues. Doesn't see the need for PSA evaluation.       Review of Systems   Respiratory:  Negative for shortness of breath.    Cardiovascular:  Negative for chest pain.   Musculoskeletal:         Bilateral feet swelling x fwe months  No pain   Neurological:  Negative for headaches.       Active Medications[1]    BP (!) 140/90   Pulse 60   Temp 36.8 °C (98.3 °F) (Temporal)   Ht 1.753 m (5' 9\")   Wt 92.8 kg (204 lb 8 oz)   SpO2 97% , Body mass index is 30.2 kg/m².        Physical Exam  Constitutional:       Appearance: Normal appearance.   Eyes:      Extraocular Movements: Extraocular movements intact.   Cardiovascular:      Rate and Rhythm: Normal rate and regular rhythm.      Heart sounds: Murmur heard.   Pulmonary:      Effort: " Pulmonary effort is normal.      Breath sounds: Normal breath sounds.   Musculoskeletal:      Comments: Bilateral pedal edema   Neurological:      Mental Status: He is alert.   Psychiatric:         Mood and Affect: Mood normal.         Behavior: Behavior normal.         Results         Assessment & Plan  1. Hypertension:   Chronic.  Not well-controlled  - BP remains elevated on losartan 100 mg. Today's readings: 140/90 and 146/84. Risk for heart attacks and strokes.  - Advised adequate hydration and limit salt intake.  Limit eating out due to increased sodium.  - Prescribed hydrochlorothiazide 12.5 mg with losartan 100 mg in the morning.  States that he just picked up his prescription and would not like a combination pill at this time.  - Follow-up in 2 weeks for MA visit.    2. Foot edema.  New medical diagnoses.  Stable.  Has lost 5 pounds since his last appointment with me in April.  - Increased puffiness, no pain. Likely related to hypertension and prolonged sitting.  - Advised to elevate feet for 20 minutes a few times a day, ensure hydration, limit salt intake.  - Hydrochlorothiazide 12.5 mg should help.    . Heart murmur.  - Noted during exam. Last echocardiogram 2 years ago.  - Ordered echocardiogram to assess changes and ensure stability, may explain fluid retention and high BP.    3 dyslipidemia  Chronic medical diagnoses.  Stable.  Last set of labs were checked in 2024 with LDL level at 56.  Continue with atorvastatin 20 mg daily    Follow-up  - Follow-up in 2 weeks for BP check.  - Follow-up in 3 months.    Orders Placed This Encounter    EC-ECHOCARDIOGRAM COMPLETE W/O CONT    Lipid Profile    TSH WITH REFLEX TO FT4    VITAMIN B12    VITAMIN D,25 HYDROXY (DEFICIENCY)    Comp Metabolic Panel    CBC WITH DIFFERENTIAL    hydrochlorothiazide (MICROZIDE) 12.5 MG capsule          () Today's E/M visit is associated with medical care services that serve as the continuing focal point for all needed health  care services and/or with medical care services that are part of ongoing care related to a patient's single, serious condition, or a complex condition: This includes furnishing services to patients on an ongoing basis that result in care that is personalized to the patient. The services result in a comprehensive, longitudinal, and continuous relationship with the patient and involve delivery of team-based care that is accessible, coordinated with other practitioners and providers, and integrated with the broader health care landscape.      This note was created using voice recognition software (Dragon) and [employer]. The accuracy of the dictation is limited by the abilities of the software.  . Occasional transcription errors may have escaped proof reading. I have made every reasonable attempt to correct obvious errors, but I expect that there are errors of grammar and possibly content that I did not discover before finalizing the note. ~           [1]   Outpatient Medications Marked as Taking for the 7/15/25 encounter (Office Visit) with Shoshana Britt M.D.   Medication Sig Dispense Refill    hydrochlorothiazide (MICROZIDE) 12.5 MG capsule Take 1 Capsule by mouth every day. 100 Capsule 3    atorvastatin (LIPITOR) 20 MG Tab Take 1 tablet by mouth in the evening 100 Tablet 3    losartan (COZAAR) 100 MG Tab Take 1 Tablet by mouth every day. 100 Tablet 3    acetaminophen (TYLENOL) 650 MG CR tablet Take 650 mg by mouth every 6 hours as needed.      Cholecalciferol (VITAMIN D3) 2000 UNIT Cap Take 1 Capsule by mouth every day.      MAGNESIUM CITRATE PO Take 150 mg by mouth. Unable to recall dose - as needed

## 2025-07-20 ENCOUNTER — APPOINTMENT (OUTPATIENT)
Dept: RADIOLOGY | Facility: MEDICAL CENTER | Age: 85
End: 2025-07-20
Attending: STUDENT IN AN ORGANIZED HEALTH CARE EDUCATION/TRAINING PROGRAM
Payer: MEDICARE

## 2025-07-20 ENCOUNTER — HOSPITAL ENCOUNTER (EMERGENCY)
Facility: MEDICAL CENTER | Age: 85
End: 2025-07-20
Attending: STUDENT IN AN ORGANIZED HEALTH CARE EDUCATION/TRAINING PROGRAM
Payer: MEDICARE

## 2025-07-20 VITALS
HEIGHT: 69 IN | DIASTOLIC BLOOD PRESSURE: 81 MMHG | OXYGEN SATURATION: 98 % | HEART RATE: 62 BPM | WEIGHT: 201.94 LBS | SYSTOLIC BLOOD PRESSURE: 157 MMHG | RESPIRATION RATE: 17 BRPM | BODY MASS INDEX: 29.91 KG/M2 | TEMPERATURE: 97.3 F

## 2025-07-20 DIAGNOSIS — R51.9 ACUTE NONINTRACTABLE HEADACHE, UNSPECIFIED HEADACHE TYPE: Primary | ICD-10-CM

## 2025-07-20 DIAGNOSIS — M79.602 LEFT ARM PAIN: ICD-10-CM

## 2025-07-20 DIAGNOSIS — G89.29 CHRONIC NECK PAIN: ICD-10-CM

## 2025-07-20 DIAGNOSIS — M54.2 CHRONIC NECK PAIN: ICD-10-CM

## 2025-07-20 LAB
ALBUMIN SERPL BCP-MCNC: 4.4 G/DL (ref 3.2–4.9)
ALBUMIN/GLOB SERPL: 1.4 G/DL
ALP SERPL-CCNC: 63 U/L (ref 30–99)
ALT SERPL-CCNC: 25 U/L (ref 2–50)
ANION GAP SERPL CALC-SCNC: 11 MMOL/L (ref 7–16)
AST SERPL-CCNC: 26 U/L (ref 12–45)
BASOPHILS # BLD AUTO: 0.4 % (ref 0–1.8)
BASOPHILS # BLD: 0.02 K/UL (ref 0–0.12)
BILIRUB SERPL-MCNC: 1.1 MG/DL (ref 0.1–1.5)
BUN SERPL-MCNC: 19 MG/DL (ref 8–22)
CALCIUM ALBUM COR SERPL-MCNC: 9.8 MG/DL (ref 8.5–10.5)
CALCIUM SERPL-MCNC: 10.1 MG/DL (ref 8.5–10.5)
CHLORIDE SERPL-SCNC: 103 MMOL/L (ref 96–112)
CO2 SERPL-SCNC: 25 MMOL/L (ref 20–33)
CREAT SERPL-MCNC: 1.12 MG/DL (ref 0.5–1.4)
EKG IMPRESSION: NORMAL
EOSINOPHIL # BLD AUTO: 0.01 K/UL (ref 0–0.51)
EOSINOPHIL NFR BLD: 0.2 % (ref 0–6.9)
ERYTHROCYTE [DISTWIDTH] IN BLOOD BY AUTOMATED COUNT: 44.5 FL (ref 35.9–50)
GFR SERPLBLD CREATININE-BSD FMLA CKD-EPI: 65 ML/MIN/1.73 M 2
GLOBULIN SER CALC-MCNC: 3.2 G/DL (ref 1.9–3.5)
GLUCOSE SERPL-MCNC: 99 MG/DL (ref 65–99)
HCT VFR BLD AUTO: 45.4 % (ref 42–52)
HGB BLD-MCNC: 15.4 G/DL (ref 14–18)
IMM GRANULOCYTES # BLD AUTO: 0.09 K/UL (ref 0–0.11)
IMM GRANULOCYTES NFR BLD AUTO: 1.9 % (ref 0–0.9)
LYMPHOCYTES # BLD AUTO: 1.43 K/UL (ref 1–4.8)
LYMPHOCYTES NFR BLD: 30.5 % (ref 22–41)
MCH RBC QN AUTO: 33.7 PG (ref 27–33)
MCHC RBC AUTO-ENTMCNC: 33.9 G/DL (ref 32.3–36.5)
MCV RBC AUTO: 99.3 FL (ref 81.4–97.8)
MONOCYTES # BLD AUTO: 0.91 K/UL (ref 0–0.85)
MONOCYTES NFR BLD AUTO: 19.4 % (ref 0–13.4)
NEUTROPHILS # BLD AUTO: 2.23 K/UL (ref 1.82–7.42)
NEUTROPHILS NFR BLD: 47.6 % (ref 44–72)
NRBC # BLD AUTO: 0 K/UL
NRBC BLD-RTO: 0 /100 WBC (ref 0–0.2)
PLATELET # BLD AUTO: 122 K/UL (ref 164–446)
PMV BLD AUTO: 11.6 FL (ref 9–12.9)
POTASSIUM SERPL-SCNC: 3.9 MMOL/L (ref 3.6–5.5)
PROT SERPL-MCNC: 7.6 G/DL (ref 6–8.2)
RBC # BLD AUTO: 4.57 M/UL (ref 4.7–6.1)
SODIUM SERPL-SCNC: 139 MMOL/L (ref 135–145)
TROPONIN T SERPL-MCNC: 11 NG/L (ref 6–19)
TROPONIN T SERPL-MCNC: 11 NG/L (ref 6–19)
WBC # BLD AUTO: 4.7 K/UL (ref 4.8–10.8)

## 2025-07-20 PROCEDURE — 96374 THER/PROPH/DIAG INJ IV PUSH: CPT

## 2025-07-20 PROCEDURE — 85025 COMPLETE CBC W/AUTO DIFF WBC: CPT

## 2025-07-20 PROCEDURE — 93005 ELECTROCARDIOGRAM TRACING: CPT | Mod: TC

## 2025-07-20 PROCEDURE — 700111 HCHG RX REV CODE 636 W/ 250 OVERRIDE (IP): Mod: JZ | Performed by: STUDENT IN AN ORGANIZED HEALTH CARE EDUCATION/TRAINING PROGRAM

## 2025-07-20 PROCEDURE — 99285 EMERGENCY DEPT VISIT HI MDM: CPT

## 2025-07-20 PROCEDURE — 700105 HCHG RX REV CODE 258: Performed by: STUDENT IN AN ORGANIZED HEALTH CARE EDUCATION/TRAINING PROGRAM

## 2025-07-20 PROCEDURE — 71045 X-RAY EXAM CHEST 1 VIEW: CPT

## 2025-07-20 PROCEDURE — 84484 ASSAY OF TROPONIN QUANT: CPT | Mod: 91

## 2025-07-20 PROCEDURE — 36415 COLL VENOUS BLD VENIPUNCTURE: CPT

## 2025-07-20 PROCEDURE — 80053 COMPREHEN METABOLIC PANEL: CPT

## 2025-07-20 PROCEDURE — 96375 TX/PRO/DX INJ NEW DRUG ADDON: CPT

## 2025-07-20 PROCEDURE — 93005 ELECTROCARDIOGRAM TRACING: CPT | Mod: TC | Performed by: STUDENT IN AN ORGANIZED HEALTH CARE EDUCATION/TRAINING PROGRAM

## 2025-07-20 RX ORDER — ACETAMINOPHEN 10 MG/ML
1000 INJECTION, SOLUTION INTRAVENOUS ONCE
Status: COMPLETED | OUTPATIENT
Start: 2025-07-20 | End: 2025-07-20

## 2025-07-20 RX ORDER — PROCHLORPERAZINE EDISYLATE 5 MG/ML
10 INJECTION INTRAMUSCULAR; INTRAVENOUS ONCE
Status: COMPLETED | OUTPATIENT
Start: 2025-07-20 | End: 2025-07-20

## 2025-07-20 RX ORDER — DEXAMETHASONE SODIUM PHOSPHATE 4 MG/ML
6 INJECTION, SOLUTION INTRA-ARTICULAR; INTRALESIONAL; INTRAMUSCULAR; INTRAVENOUS; SOFT TISSUE ONCE
Status: COMPLETED | OUTPATIENT
Start: 2025-07-20 | End: 2025-07-20

## 2025-07-20 RX ORDER — SODIUM CHLORIDE 9 MG/ML
INJECTION, SOLUTION INTRAVENOUS ONCE
Status: COMPLETED | OUTPATIENT
Start: 2025-07-20 | End: 2025-07-20

## 2025-07-20 RX ADMIN — PROCHLORPERAZINE EDISYLATE 10 MG: 5 INJECTION INTRAMUSCULAR; INTRAVENOUS at 13:34

## 2025-07-20 RX ADMIN — DEXAMETHASONE SODIUM PHOSPHATE 6 MG: 4 INJECTION, SOLUTION INTRAMUSCULAR; INTRAVENOUS at 16:13

## 2025-07-20 RX ADMIN — SODIUM CHLORIDE: 9 INJECTION, SOLUTION INTRAVENOUS at 13:33

## 2025-07-20 RX ADMIN — ACETAMINOPHEN 1000 MG: 10 INJECTION, SOLUTION INTRAVENOUS at 13:33

## 2025-07-20 ASSESSMENT — PAIN DESCRIPTION - PAIN TYPE: TYPE: ACUTE PAIN

## 2025-07-20 ASSESSMENT — FIBROSIS 4 INDEX: FIB4 SCORE: 3.7

## 2025-07-20 NOTE — ED NOTES
Medicated per MAR.     Provided DC instructions, Rx info, outpt f/u, PIV removed, questions answered.

## 2025-07-20 NOTE — ED PROVIDER NOTES
"ED Provider Note    CHIEF COMPLAINT  Chief Complaint   Patient presents with    Headache     A few days ago continues to L [place] area     Hypertension     Noted his BP has been in the 160's      Sweats     \"I feel clammy\"     Arm Pain     About 2 days ago had arm pain that rad in to his shoulder   denies injury or cause        EXTERNAL RECORDS REVIEWED  Outpatient Notes office visit on 7/15/2025 for chronic hypertension, pedal edema, heart murmur, hyperlipidemia    HPI/ROS  LIMITATION TO HISTORY   Select: : None  OUTSIDE HISTORIAN(S):    Michael Pradhan is a 85 y.o. male who presents with headache, hypertension, sweats, arm pain.  Patient denies chest pain.  Patient denies vision changes, unilateral weakness or numbness, slurred speech, facial droop.  Patient has had left upper extremity discomfort previously.  Patient has a history of chronic neck pain as well.  Patient reports the headache is similar to prior headaches, not the worst headache of his life, not thunderclap in etiology.    PAST MEDICAL HISTORY   has a past medical history of Arthritis, BMI 27.0-27.9,adult (05/27/2021), Encounter for medical examination to establish care (2/5/2024), Heart burn, High cholesterol, Hyperlipidemia, Hypertension, Rhinorrhea (01/31/2023), and suspected TIA (2018).    SURGICAL HISTORY   has a past surgical history that includes tonsillectomy; colonoscopy; and laparoscopic inguinal hernia repair (Bilateral, 1/22/2020).    FAMILY HISTORY  Family History   Problem Relation Age of Onset    Diabetes Mother     Hypertension Father     Diabetes Sister     Diabetes Sister     Other Brother         Spinal Miningitis    Schizophrenia Brother     Cancer Brother         Esophigial    Dementia Maternal Grandmother     Dementia Paternal Grandmother     Colon Cancer Paternal Grandfather        SOCIAL HISTORY  Social History     Tobacco Use    Smoking status: Former     Current packs/day: 0.00     Average packs/day: 1 pack/day for " "5.0 years (5.0 ttl pk-yrs)     Types: Cigarettes     Start date: 1964     Quit date: 1969     Years since quittin.4    Smokeless tobacco: Never    Tobacco comments:     Smoked for about 4-5 ys and quit in .   Vaping Use    Vaping status: Never Used   Substance and Sexual Activity    Alcohol use: Yes     Alcohol/week: 0.0 - 1.2 oz     Comment: twice a month    Drug use: No    Sexual activity: Yes     Partners: Female       CURRENT MEDICATIONS  Home Medications       Reviewed by Mariya Doty (Pharmacy Tech) on 25 at 1444  Med List Status: Complete     Medication Last Dose Status   acetaminophen (TYLENOL) 650 MG CR tablet 2025 Active   atorvastatin (LIPITOR) 20 MG Tab 2025 Active   Cholecalciferol (VITAMIN D3 PO) 2025 Active   hydrochlorothiazide (MICROZIDE) 12.5 MG capsule 2025 Active   losartan (COZAAR) 100 MG Tab 2025 Active   MAGNESIUM PO 2025 Active                  Audit from Redirected Encounters    **Home medications have not yet been reviewed for this encounter**         ALLERGIES  Allergies[1]    PHYSICAL EXAM  VITAL SIGNS: BP (!) 157/81   Pulse 62   Temp 36.3 °C (97.3 °F) (Temporal)   Resp 17   Ht 1.753 m (5' 9\")   Wt 91.6 kg (201 lb 15.1 oz)   SpO2 98%   BMI 29.82 kg/m²    Vitals and nursing note reviewed.   Constitutional:       Comments: Patient is lying in bed supine, pleasant, conversant, speaking in complete sentences   HENT:      Head: Normocephalic and atraumatic.   Eyes:      Extraocular Movements: Extraocular movements intact.      Conjunctiva/sclera: Conjunctivae normal.      Pupils: Pupils are equal, round, and reactive to light.   Cardiovascular:      Pulses: Normal pulses.      Comments: HR 57  Pulmonary:      Effort: Pulmonary effort is normal. No respiratory distress.   Musculoskeletal:         General: No swelling. Normal range of motion.      Cervical back: Normal range of motion. No rigidity.   Skin:     General: Skin is " warm and dry.      Capillary Refill: Capillary refill takes less than 2 seconds.   Neurological:      Mental Status: Alert. CN II-XII intact, speech normal, motor strength 5/5 in all four extremities, normal  strength bilaterally, sensation to light touch grossly intact in all extremities, no finger to nose dysmetria, no pronator drift, no nystagmus, AO x3        EKG/LABS  No acute ischemic changes  I have independently interpreted this EKG    RADIOLOGY/PROCEDURES   I have independently interpreted the diagnostic imaging associated with this visit and am waiting the final reading from the radiologist.   My preliminary interpretation is as follows: No focal consolidation    Radiologist interpretation:  DX-CHEST-LIMITED (1 VIEW)   Final Result      No acute cardiac or pulmonary abnormality is identified.          COURSE & MEDICAL DECISION MAKING    ASSESSMENT, COURSE AND PLAN  Care Narrative: No focal neurologic deficits, acute CVA inconsistent with patient presentation at this time.  Patient has history of iliac artery dilation but no history of aortic dilation or dissection or ulceration. CMP demonstrates no evidence of acute kidney injury, acute electrolyte abnormality, acute liver failure, CBC demonstrates no evidence of acute anemia or leukocytosis.  Troponin negative, EKG nonischemic, ACS less likely at this time.  IV fluids, Compazine, Benadryl for symptom control.    Electronically signed by: Hadley Reeder M.D., 7/20/2025 1:54 PM    Patient headache is resolved following IV medication.  Patient counseled to follow-up with PCP and given spine specialist for chronic left arm pain.  No focal neurologic deficits on reexamination. CMP demonstrates no evidence of acute kidney injury, acute electrolyte abnormality, acute liver failure, CBC demonstrates no evidence of acute anemia or leukocytosis.  X 2 is negative, ACS inconsistent with patient presentation at this time.    Repeat physical exam benign.  I  doubt any serious emergency process at this time.  Patient and/or family, friends given strict return precautions for worsening symptoms and care instructions. They have demonstrated understanding of discharge instructions through teach back mechanism. Advised PCP follow-up in 1-2 days.  Patient/family/friend expresses understanding and agrees to plan.    This dictation has been created using voice recognition software. I am continuously working with the software to minimize the number of voice recognition errors and I have made every attempt to manually correct the errors within my dictation. However errors  related to this voice recognition software may still exist and should be interpreted within the appropriate context.     Electronically signed by: Hadley Reeder M.D., 7/20/2025 4:16 PM        HEART SCORE:  History - 0  EKG - 0  Age - 2  Risk Factors - 2  Initial Troponin - 0  Total - 4      Hydration: Based on the patient's presentation of Dehydration the patient was given IV fluids. IV Hydration was used because oral hydration was not adequate alone. Upon recheck following hydration, the patient was improved.      DISPOSITION AND DISCUSSIONS  Escalation of care considered, and ultimately not performed:acute inpatient care management, however at this time, the patient is most appropriate for outpatient management      FINAL DIAGNOSIS  1. Acute nonintractable headache, unspecified headache type    2. Left arm pain    3. Chronic neck pain         Electronically signed by: Hadley Reeder M.D., 7/20/2025 1:51 PM           [1]   Allergies  Allergen Reactions    Bee Hives and Swelling

## 2025-07-20 NOTE — ED TRIAGE NOTES
"BP (!) 157/102   Pulse 64   Temp 36.3 °C (97.3 °F) (Temporal)   Resp 18   Ht 1.753 m (5' 9\")   Wt 91.6 kg (201 lb 15.1 oz)   SpO2 95%   BMI 29.82 kg/m²   Chief Complaint   Patient presents with    Headache     A few days ago continues to L [place] area     Hypertension     Noted his BP has been in the 160's      Sweats     \"I feel clammy\"     Arm Pain     About 2 days ago had arm pain that rad in to his shoulder   denies injury or cause      Comes in w/ friend  she is taking noted about pt during triage   "

## 2025-07-20 NOTE — ED NOTES
Medication history reviewed with pt. Med rec is complete.  Allergies reviewed, per pt  Interviewed pt with friend at bedside with permission from pt.    Any outpatient anti-MICROBIAL in the last 30 days? NO    Pt is not on any anticoagulants

## 2025-07-20 NOTE — ED NOTES
Dr Reeder presents to the bedside to engage in barbie conversation regarding the patient's reason for presenting to the ED and POC.

## 2025-07-20 NOTE — DISCHARGE PLANNING
TCN following. HTH/SCP chart review completed. Note pt currently in ED 2' to headache, HTN, sweats and arm pain.      Note pt has a [facility] PCP and does not have a primary/follow up visit scheduled.  Will refer to  for PCP f/u if indicated by ERP at discharge.  Per review, no documentation on ambulatory status at this time and he does not have need for O2 at this time.  Based on current review, it is anticipated that pt will dc to home with close OP f/u (either directly from ED or after admission to [facility] if warranted).     If patient does not warrant admission/ inpatient status to [facility] and is unable to functionally discharge home, please reach out to T[facility] for assist with SCP auth to discharge directly from ED to [facility].     If patient admits to [facility], please reach out to TCN via VOALTE if post acute transitional care needs are warranted for dc planning. Thank you.      Addendum:  1646- Per chart review, patient discharged home.  Referred to  for PCP f/u as indicated by ERP:  Hospital Follow Up with Physician Ana Fiore D.O. on Wednesday July 23 2:05 PM.

## 2025-07-23 ENCOUNTER — OFFICE VISIT (OUTPATIENT)
Dept: MEDICAL GROUP | Facility: PHYSICIAN GROUP | Age: 85
End: 2025-07-23
Payer: MEDICARE

## 2025-07-23 VITALS
OXYGEN SATURATION: 94 % | HEIGHT: 67 IN | HEART RATE: 65 BPM | WEIGHT: 198.4 LBS | SYSTOLIC BLOOD PRESSURE: 134 MMHG | DIASTOLIC BLOOD PRESSURE: 84 MMHG | BODY MASS INDEX: 31.14 KG/M2 | RESPIRATION RATE: 24 BRPM | TEMPERATURE: 97.9 F

## 2025-07-23 DIAGNOSIS — I72.3 ANEURYSM OF COMMON ILIAC ARTERY (HCC): ICD-10-CM

## 2025-07-23 DIAGNOSIS — I10 HYPERTENSION, UNSPECIFIED TYPE: ICD-10-CM

## 2025-07-23 DIAGNOSIS — Z76.89 SEEN IN EMERGENCY ROOM: ICD-10-CM

## 2025-07-23 DIAGNOSIS — G44.86 CERVICOGENIC HEADACHE: ICD-10-CM

## 2025-07-23 DIAGNOSIS — R74.8 ELEVATED CPK: ICD-10-CM

## 2025-07-23 DIAGNOSIS — D72.819 LEUKOPENIA, UNSPECIFIED TYPE: ICD-10-CM

## 2025-07-23 DIAGNOSIS — D69.6 THROMBOCYTOPENIA (HCC): ICD-10-CM

## 2025-07-23 DIAGNOSIS — M54.2 NECK PAIN: Primary | ICD-10-CM

## 2025-07-23 PROCEDURE — 3079F DIAST BP 80-89 MM HG: CPT | Performed by: INTERNAL MEDICINE

## 2025-07-23 PROCEDURE — 99214 OFFICE O/P EST MOD 30 MIN: CPT | Performed by: INTERNAL MEDICINE

## 2025-07-23 PROCEDURE — 3075F SYST BP GE 130 - 139MM HG: CPT | Performed by: INTERNAL MEDICINE

## 2025-07-23 RX ORDER — HYDROCHLOROTHIAZIDE 12.5 MG/1
25 CAPSULE ORAL DAILY
Qty: 200 CAPSULE | Refills: 3
Start: 2025-07-23

## 2025-07-23 ASSESSMENT — FIBROSIS 4 INDEX: FIB4 SCORE: 3.54

## 2025-07-23 NOTE — PROGRESS NOTES
Subjective:   Chief Complaint/History of Present Illness:  Michael Pradhan is a 85 y.o. male established patient who presents today to discuss medical problems as listed below. Michael is unaccompanied for today's visit.    History of Present Illness  The patient presents for evaluation of elevated blood pressure, numbness in the arm and shoulder, headaches, and iliac aneurysm.    He experienced elevated blood pressure readings up to 160 over the past three days, as measured by his home upper arm cuff. This morning, his blood pressure was recorded at nearly 160/90 approximately an hour after taking his medication. He has been on hydrochlorothiazide for just over a week and has noticed a reduction in morning foot puffiness since starting the medication. He does not add salt to his food and avoids pre-packaged foods. His heart rate occasionally drops to the 50s. He has had several consultations with Dr. Renee, a cardiologist, and has an annual checkup scheduled later this year.    On Sunday, he began experiencing numbness in his arm and shoulder, which led him to seek medical attention. The diagnosis suggested that arthritis in his neck might be causing a pinched nerve. He reports no difficulty swallowing.    He also reported experiencing dull headaches across the front and left temple of his head over the same three-day period. He does not consume caffeine but eats a few blocks of chocolate daily. He did not report any respiratory symptoms during his ER visit, although a chest x-ray was performed. He has no history of lung disease such as asthma, pneumonia, or COPD. He does not snore or wake up frequently at night and feels refreshed upon waking. He recently started using a new pillow, which causes some morning irritation and soreness. He takes Tylenol for arthritis but does not take any medication for headaches.    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 However, he continues to lift weights at the gym daily without any issues. The aneurysm has remained stable for several years.    He has a long-standing history of low white blood cell and platelet counts, which have been investigated in the past with inconclusive results. He participated in SALAS testing, which indicated a low risk of liver disease. He has a small ganglion cyst or lipoma on his hand, which does not cause him discomfort.    Social History:  Occupations:   Diet: Does not add salt to food, avoids pre-packaged foods  Coffee/Tea/Caffeine-containing Drinks: Does not consume caffeine  Sleep: Reports feeling refreshed upon waking, does not snore or wake up frequently at night       Current Medications:  Current Medications and Prescriptions Ordered in Epic[1]       Objective:   Physical Exam:    Vitals: /84 (BP Location: Right arm, Patient Position: Sitting, BP Cuff Size: Adult)   Pulse 65   Temp 36.6 °C (97.9 °F)   Resp (!) 24   Ht 1.71 m (5' 7.32\")   Wt 90 kg (198 lb 6.4 oz)   SpO2 94%    BMI: Body mass index is 30.78 kg/m².  Physical Exam  Constitutional:       General: He is not in acute distress.     Appearance: Normal appearance. He is not ill-appearing.   HENT:      Right Ear: Ear canal and external ear normal.      Left Ear: Ear canal and external ear normal.      Ears:      Comments: Minimal cerumen in bilateral ear canals  Eyes:      General: No scleral icterus.     Conjunctiva/sclera: Conjunctivae normal.   Cardiovascular:      Rate and Rhythm: Normal rate and regular rhythm.      Pulses: Normal pulses.      Heart sounds: Murmur heard.   Pulmonary:      Effort: Pulmonary effort is normal. No respiratory distress.      Breath sounds: Normal breath sounds. No wheezing or rhonchi.   Abdominal:      General: Bowel sounds are normal.      Palpations: Abdomen is soft.   Musculoskeletal:      Right lower leg: No edema.      Left lower leg: No edema.      Comments: Fullness " of bilateral thoracic paravertebrals   Lymphadenopathy:      Cervical: No cervical adenopathy.   Skin:     General: Skin is warm and dry.      Findings: No rash.   Psychiatric:         Mood and Affect: Mood normal.         Behavior: Behavior normal.         Thought Content: Thought content normal.         Judgment: Judgment normal.           Results  Labs   - Complete Blood Count (CBC): White cells and platelets are on the low end.   - ANGUS: Positive   - Santillan antigen: Negative   - Double stranded DNA: Negative   - Robert antibody: Negative   - Kidney function test: Slightly down from baseline.   - Liver function test: Liver enzymes were good.   - Protein levels: Good.   - Troponin level: Stable.    Imaging   - Chest x-ray: Very mild heart enlargement, possibly due to rotation. Lung markings look normal. No indication of fluid overload from a heart problem.   - CAT scan of the abdomen: Good appearance.   - Lumbar spine imaging: A little bit of curvature and more arthritis at the L5 level.    Diagnostic Testing   - EKG: No ischemia, rhythm and intervals look good.    Assessment & Plan  1. Hypertension  - Blood pressure readings have been elevated, with recent measurements up to 160/90 mmHg this morning 1 hour after taking his medications.  - Currently on hydrochlorothiazide 12.5 mg daily and losartan 100 mg daily; hydrochlorothiazide dosage will be increased to 25 mg daily.  - Advised to bring his automated home blood pressure monitor to the MA visit in 6 days for comparison against the clinic's manual device.  - Follow-up appointment with the medical assistant scheduled for 07/29/2025 to monitor blood pressure.  - follow up with cardiology planned on an annual basis.    2. Neck pain with left radiculopathy  - Numbness in the arm and shoulder could be attributed to cervical impingement or muscle spasticity and hypertrophy.  - Physical exam findings include tightness in the trapezius and upper shoulder muscles.  - An  x-ray of the cervical spine will be ordered to assess the current status.  - Further evaluation may be necessary through physiatry, depending on x-ray results. Discussed cervical spine MRI and EMG may be indicated.   - Changed out his pillow recently, otherwise no new changes.    3. Headaches, cervicogenic.  - Headaches could potentially be linked to sleep apnea, which can cause low oxygen levels and subsequent headache syndrome.  - Reports no issues with snoring or waking up at night, and feels refreshed in the morning.  - Further evaluation for sleep apnea may be considered if symptoms persist.  - Neck arthritis with referred pain also likely contributor, start with cervical spine xray to get baseline assessment.  - Does not consume any significant caffeine. Takes Tylenol as needed for arthritic pain.    4. Iliac aneurysm.  - Has an iliac aneurysm and has been advised not to lift weights higher than 15 pounds.  - Encouraged to continue exercising in a controlled manner without causing pain.  - No recent changes in the aneurysm size noted.    5. Leukopenia  6. Thrombocytopenia   7. Elevated CPK  - hematologic dyscrasias with low WBC and low platelets, could be related to prior viral infection such as EBV. Could be hepatic etiology, low SALAS screening in the past. Consider rechecking CPK due to high level in the past. Inconclusive autoimmune evaluation in the past.     8. Seen in ER  - today was to follow up recent ED visit.      Assessment and Plan:   Michael is a 85 y.o. male with the following:  Problem List Items Addressed This Visit       Aneurysm of common iliac artery (HCC)    Relevant Medications    hydrochlorothiazide (MICROZIDE) 12.5 MG capsule    Hypertension    Relevant Medications    hydrochlorothiazide (MICROZIDE) 12.5 MG capsule    Thrombocytopenia (HCC)     Other Visit Diagnoses         Neck pain    -  Primary    Relevant Orders    DX-CERVICAL SPINE-2 OR 3 VIEWS      Seen in emergency room           Leukopenia, unspecified type          Cervicogenic headache          Elevated CPK                   RTC: Return in about 3 months (around 10/23/2025).    I spent a total of 34 minutes with record review, exam, communication with the patient, communication with other providers, and documentation of this encounter.    Verbal consent was acquired by the patient to use "[employer]" ambient listening note generation during this visit Yes       PLEASE NOTE: This dictation was created using voice recognition software. I have made every reasonable attempt to correct obvious errors, but I expect that there are errors of grammar and possibly content that I did not discover before finalizing the note.      Ana Fiore, DO  Geriatric and Internal Medicine  [facility]            [1]   Current Outpatient Medications Ordered in Epic   Medication Sig Dispense Refill    hydrochlorothiazide (MICROZIDE) 12.5 MG capsule Take 2 Capsules by mouth every day. 200 Capsule 3    MAGNESIUM PO Take  by mouth. (OTC) pt takes sporidially      atorvastatin (LIPITOR) 20 MG Tab Take 1 tablet by mouth in the evening 100 Tablet 3    losartan (COZAAR) 100 MG Tab Take 1 Tablet by mouth every day. 100 Tablet 3    acetaminophen (TYLENOL) 650 MG CR tablet Take 650 mg by mouth every 6 hours as needed. Indications: Pain      Cholecalciferol (VITAMIN D3 PO) Take 1 Capsule by mouth every day. (OTC)       No current Epic-ordered facility-administered medications on file.

## 2025-07-25 ENCOUNTER — HOSPITAL ENCOUNTER (OUTPATIENT)
Dept: RADIOLOGY | Facility: MEDICAL CENTER | Age: 85
End: 2025-07-25
Attending: INTERNAL MEDICINE
Payer: MEDICARE

## 2025-07-25 DIAGNOSIS — M54.2 NECK PAIN: ICD-10-CM

## 2025-07-25 PROCEDURE — 72040 X-RAY EXAM NECK SPINE 2-3 VW: CPT

## 2025-07-29 ENCOUNTER — NON-PROVIDER VISIT (OUTPATIENT)
Dept: MEDICAL GROUP | Facility: PHYSICIAN GROUP | Age: 85
End: 2025-07-29
Payer: MEDICARE

## 2025-07-29 NOTE — PROGRESS NOTES
Kermit Pradhan is a 85 y.o. male here for a non-provider visit for BP Check  124/70      If abnormal, was the Registered Nurse (office provider if RN is unavailable) notified today? No    Routed to PCP/Requested Provider? No

## 2025-07-29 NOTE — Clinical Note
Kermit Pradhan  [address]   [place] [address]    July 29, 2025    Member Name: Michael Pradhan   Member Number: [id number]   Reference Number: [id number]   Approved Services: Echos and EKG   Approved Service Dates: 07/29/2025 - 11/26/2025   Requesting Provider: Shoshana Britt   Requested Provider: [facility]     Dear Michael Pradhan:    The following medical service(s) requested by Shoshana Britt have been approved:    Procedure Code Procedure Code Name Requested Quantity Approved Quantity Status   33602 (CPT®) [place] ECHO HEART XTHORACIC,COMPLETE W DOPPLER 1 1 Authorized       Approved Quantity means the number of visits approved for medication treatments and/or medical services.    The services should be provided by [facility] no later than 11/26/2025. Please contact the provider listed below with any questions. Your plan benefit may require a deductible, co-payment, or co-insurance for these services.    Provider Information:  [facility]  [phone]    Important Note for Members:    This authorization does not guarantee that [facility] or [facility] will pay for your care. Payment depends on your plan details, if you're eligible for coverage on the day you receive care, and whether the service follows plan rules. These include things such as reviewing if the service is medically necessary. We recommend reviewing your Evidence of Coverage before receiving any care.    Important Note for Providers:    Payment by [facility] or [facility] for these services is subject to the terms of the Evidence of Coverage or Summary Plan Description, eligibility at the time of service, standard processing procedures and confirmation of benefit coverage. All claims are subject to standard processing procedures, including but not limited to coding edits, medical necessity determinations, and other plan policies in  effect at the time of claim adjudication. Providers are required to follow all [facility] Administrative Guidelines and requirements.    For any questions or additional information, please contact Customer Service:    [employer] Toll Free: [phone]  TTY users dial: [phone]   Call Center Hours:  Oct 1 - Mar 31, Mon - Fri 7 AM to 8 PM PST  Oct 1 - Mar 31, Sat - Sun 8 AM to 8 PM PST  Apr 1 - Sep 30, Mon - Fri 7 AM to 8 PM PST   Office Hours: Mon - Fri 8 AM to 5 PM PST   E-mail: [email or web address]   Website:  [email or web address]      This information is available for free in other languages. Please contact Customer Service at the phone number above for more information. long term [email or web address] complies with applicable Federal civil rights laws and does not discriminate on the basis of race, color, national origin, age, disability or sex.    Sincerely,     Healthcare Utilization Management Department     Cc: [facility]   Shoshana Britt    Multi-Language Insert  Multi- Services  English: We have free  services to answer any questions you may have about our health or drug plan.  To get an , just call us at [phone].  Someone who speaks English/Language can help you.  This is a free service.  Taiwanese: Tenemos servicios de intérprete sin costo alguno  para responder cualquier pregunta que pueda tener sobre nuestro plan de matthieu o medicamentos. Para hablar con un intérprete, por favor llame al [phone]. Alguien que hable español le podrá ayudar. Jaclyn es un servicio gratuito.  Chinese Mandarin: ?????????????????????????????? ???????????????? [phone]????????????????? ?????????  Chinese Cantonese: ?????????????????????????????? ????????????? [phone]???????????????????? ????????  Tagalog:  Jamel yi  janes hyde.  dAánanne marie aimee ng taglorindinaeeloy salazar  [phone]. Karan saab anne marie sharprobert bouchra Tobin.  Michael gould.  Lebanese:  Nous proposons deacon services gratuits d'interprétation pour répondre à toutes emmett questions relatives à notre régime de santé ou d'assurance-médicaments. Pour accéder au service d'interprétation, il vous suffit de nous appeler au [phone]. Un interlocuteur [facility] pourra vous aider. Ce service est gratuit.  Belizean:  Mark gimenez có d?ch v? thông d?ch mi?n phí ð? tr? l?i các câu h?i v? chýõng s?c kh?e và chýõng trình thu?c men. N?u quí v? c?n thông d?ch viên leisa g?i [phone] s? có nhân viên nói ti?ng Vi?t giúp ð? quí v?. Ðây là d?ch v? mi?n phí .  Sami:  Unser kosSaint John's Aurora Community Hospitalser DolmetschersCleveland Clinic Marymount Hospitalice beantBronson South Haven Hospital Ihren Fragen zu unserem Gesundheits- und Arzneimittelplan. Unsere Dolmetscher erreichen Sie [phone]. Man wird Hemaln mohinder auPowell Valley Hospital - [facility]. Dieser Service ist kostenJordan Valley Medical Center.  Ukrainian:  ??? ?? ?? ?? ?? ??? ?? ??? ?? ???? ?? ?? ???? ???? ????. ?? ???? ????? ?? [phone] ??? ??? ????.  ???? ?? ???? ?? ?? ????. ? ???? ??? ?????.   Albanian: Åñëè ó âàñ âîçíèêíóò âîïðîñû îòíîñèòåëüíî ñòðàõîâîãî èëè ìåäèêàìåíòíîãî ïëàíà, âû ìîæåòå âîñïîëüçîâàòüñÿ íàøèìè áåñïëàòíûìè óñëóãàìè ïåðåâîä÷èêîâ. ×òîáû âîñïîëüçîâàòüñÿ óñëóãàìè ïåðåâîä÷èêà, ïîçâîíèòå íàì ïî òåëåôîíó [phone]. Âàì îêàæåò ïîìîùü ñîòðóäíèê, êîòîðûé ãîâîðèò ïî-póññêè. Äàííàÿ óñëóãà áåñïëàòíàÿ.  Greenlandic: ÅääÇ äÞÏã ÎÏãÇÊ ÇáãÊÑÌã ÇáÝæÑí ÇáãÌÇäíÉ ááÅÌÇÈÉ Úä Ãí ÃÓÆáÉ ÊÊÚáÞ ÈÇáÕÍÉ Ãæ ÌÏæá ÇáÃÏæíÉ áÏíäÇ. ááÍÕæá Úáì ãÊÑÌã ÝæÑí¡ áíÓ Úáíß Óæì ÇáÇÊÕÇá ÈäÇ Úáì [phone] . ÓíÞæã ÔÎÕ ãÇ íÊÍÏË ÇáÚÑÈíÉ ÈãÓÇÚÏÊß. åÐå ÎÏãÉ ãÌÇäíÉ.  Sandrine: ????? ????????? ?? ??? ?? ????? ?? ???? ??? ???? ???? ?? ?????? ?? ???? ???? ?? ??? ????? ??? ????? ???????? ?????? ?????? ???. ?? ???????? ??????? ???? ?? ???, ?? ???? [phone] ?? ??? ????. ??? ??????? ??  ?????? ????? ?? ???? ??? ?? ???? ??. ?? ?? ????? ???? ??.   Barbadian:  È disponibile un servizio di interpretariato gratuito per rispondere a eventuali domande sul nostro piano sanitario e farmaceutico. Per un interprete, contattare il taurus [phone]. Un nostro incaricato will parla Italianovi fornirà l'assistenza necessaria. È un servizio gratuito.  Portugués:  Dispomos de serviços de interpretação gratuitos para responder a qualquer questão que tenha acerca do nosso plano de saúde ou de medicação. Para obter um intérprete, contacte-nos através do número [phone]. Irá encontrar alguém que fale o idioma  Português para o ajudar. Jaclyn serviço é gratuito.  Yakut Creole:  Nou genyen sèvis entèprèt gratis feli reponn tout kesyon ou ta genyen konsènan plan medikal oswa dwòg nou an.  Feli jwenn yon azucena, jis rele nou nan [phone]. Yon moun shawanda pale Kreyòl kapab leandro w.  Sa a se yon sèvis ki gratis.  Polish:  Umo¿liwiamy bezp³atne skorzystanie z us³ug t³umacza ustnego, który pomo¿e w uzyskaniu odpowiedzi na temat planu zdrowotnego lub dawkowania leków. Linda skorzystaæ z pomocy t³umacza znaj¹cego clemente mckeon¿y zadzwoniæ pod numer [phone]. Ta us³uga jest bezp³atna.  Dominican: ????? ??????? ????????????????????? ??????????????????????????????????[phone] ???????????????? ? ????????????????? ?????

## 2025-08-05 ENCOUNTER — ANCILLARY PROCEDURE (OUTPATIENT)
Dept: CARDIOLOGY | Facility: MEDICAL CENTER | Age: 85
End: 2025-08-05
Attending: FAMILY MEDICINE
Payer: MEDICARE

## 2025-08-05 DIAGNOSIS — I10 HYPERTENSION, UNSPECIFIED TYPE: ICD-10-CM

## 2025-08-05 LAB
LV EJECT FRACT MOD 2C 99903: 61.67
LV EJECT FRACT MOD 4C 99902: 55.04
LV EJECT FRACT MOD BP 99901: 59.11

## 2025-08-05 PROCEDURE — 93306 TTE W/DOPPLER COMPLETE: CPT | Mod: 26 | Performed by: INTERNAL MEDICINE

## 2025-08-05 PROCEDURE — 93306 TTE W/DOPPLER COMPLETE: CPT

## (undated) DEVICE — BANDAID SHEER STRIP 3/4 IN (100EA/BX 12BX/CA)

## (undated) DEVICE — CANNULA W/SEAL 5X100 Z-THRE - ADED KII (12/BX)

## (undated) DEVICE — STERI STRIP COMPOUND BENZOIN - TINCTURE 0.6ML WITH APPLICATOR (40EA/BX)

## (undated) DEVICE — ELECTRODE DUAL RETURN W/ CORD - (50/PK)

## (undated) DEVICE — PROTECTOR ULNA NERVE - (36PR/CA)

## (undated) DEVICE — GLOVE BIOGEL PI INDICATOR SZ 7.0 SURGICAL PF LF - (50/BX 4BX/CA)

## (undated) DEVICE — CANISTER SUCTION 3000ML MECHANICAL FILTER AUTO SHUTOFF MEDI-VAC NONSTERILE LF DISP  (40EA/CA)

## (undated) DEVICE — CLOSURE SKIN STRIP 1/4 X 3 IN - (STERI STRIP) (50/BX)

## (undated) DEVICE — SLEEVE, VASO, THIGH, MED

## (undated) DEVICE — SODIUM CHL IRRIGATION 0.9% 1000ML (12EA/CA)

## (undated) DEVICE — KIT ROOM DECONTAMINATION

## (undated) DEVICE — GLOVE BIOGEL INDICATOR SZ 7.5 SURGICAL PF LTX - (50PR/BX 4BX/CA)

## (undated) DEVICE — SUTURE 0 VICRYL PLUS UR-6 - 27 INCH (36/BX)

## (undated) DEVICE — SUTURE 4-0 VICRYL PLUSFS-1 - 27 INCH (36/BX)

## (undated) DEVICE — GLOVE SZ 6.5 BIOGEL PI MICRO - PF LF (50PR/BX)

## (undated) DEVICE — LACTATED RINGERS INJ 1000 ML - (14EA/CA 60CA/PF)

## (undated) DEVICE — SET LEADWIRE 5 LEAD BEDSIDE DISPOSABLE ECG (1SET OF 5/EA)

## (undated) DEVICE — ELECTRODE 850 FOAM ADHESIVE - HYDROGEL RADIOTRNSPRNT (50/PK)

## (undated) DEVICE — DEVICE 5MM ABSRB STRAP FIXATION  (6EA/BX)

## (undated) DEVICE — GLOVE SZ 7 BIOGEL PI MICRO - PF LF (50PR/BX 4BX/CA)

## (undated) DEVICE — GLOVE BIOGEL PI INDICATOR SZ 7.5 SURGICAL PF LF -(50/BX 4BX/CA)

## (undated) DEVICE — MASK ANESTHESIA ADULT  - (100/CA)

## (undated) DEVICE — TROCAR LAPSCP 100MM 12MM NTHRD - (6/BX)

## (undated) DEVICE — SET EXTENSION WITH 2 PORTS (48EA/CA) ***PART #2C8610 IS A SUBSTITUTE*****

## (undated) DEVICE — KIT ANESTHESIA W/CIRCUIT & 3/LT BAG W/FILTER (20EA/CA)

## (undated) DEVICE — TUBE E-T HI-LO CUFF 7.5MM (10EA/PK)

## (undated) DEVICE — HEAD HOLDER JUNIOR/ADULT

## (undated) DEVICE — CLOSURE SKIN STRIP 1/2 X 4 IN - (STERI STRIP) (50/BX 4BX/CA)

## (undated) DEVICE — CHLORAPREP 26 ML APPLICATOR - ORANGE TINT(25/CA)

## (undated) DEVICE — NEPTUNE 4 PORT MANIFOLD - (20/PK)

## (undated) DEVICE — GOWN WARMING STANDARD FLEX - (30/CA)

## (undated) DEVICE — TROCAR 5X100 NON BLADED Z-TH - READ KII (6/BX)

## (undated) DEVICE — GLOVE BIOGEL PI INDICATOR SZ 6.5 SURGICAL PF LF - (50/BX 4BX/CA)

## (undated) DEVICE — SUTURE GENERAL

## (undated) DEVICE — TUBING CLEARLINK DUO-VENT - C-FLO (48EA/CA)

## (undated) DEVICE — WATER IRRIG. STER. 1500 ML - (9/CA)

## (undated) DEVICE — SENSOR SPO2 NEO LNCS ADHESIVE (20/BX) SEE USER NOTES

## (undated) DEVICE — PACK LAP CHOLE OR - (2EA/CA)

## (undated) DEVICE — GLOVE BIOGEL SZ 7 SURGICAL PF LTX - (50PR/BX 4BX/CA)

## (undated) DEVICE — SUCTION INSTRUMENT YANKAUER BULBOUS TIP W/O VENT (50EA/CA)